# Patient Record
Sex: MALE | Race: WHITE | Employment: OTHER | URBAN - METROPOLITAN AREA
[De-identification: names, ages, dates, MRNs, and addresses within clinical notes are randomized per-mention and may not be internally consistent; named-entity substitution may affect disease eponyms.]

---

## 2017-01-05 RX ORDER — TOPIRAMATE 25 MG/1
75 TABLET ORAL
Qty: 270 TAB | Refills: 0 | Status: SHIPPED | OUTPATIENT
Start: 2017-01-05 | End: 2017-08-10 | Stop reason: SDUPTHER

## 2017-01-05 NOTE — TELEPHONE ENCOUNTER
Last Visit: 08/22/2016 with MD Abdirashid Jiménez    Next Appointment: 02/16/2017 with MD Abdirashid Jiménez   Previous Refill Encounters: 08/04/2016 per NP Harry Hammond #[id number]     Requested Prescriptions     Pending Prescriptions Disp Refills    topiramate (TOPAMAX) 25 mg tablet 270 Tab 0     Sig: Take 3 Tabs by mouth nightly.

## 2017-02-16 ENCOUNTER — OFFICE VISIT (OUTPATIENT)
Dept: ORTHOPEDIC SURGERY | Age: 72
End: 2017-02-16

## 2017-02-16 VITALS
DIASTOLIC BLOOD PRESSURE: 70 MMHG | HEART RATE: 58 BPM | HEIGHT: 72 IN | BODY MASS INDEX: 37.93 KG/M2 | WEIGHT: 280 LBS | SYSTOLIC BLOOD PRESSURE: 131 MMHG

## 2017-02-16 DIAGNOSIS — M48.061 SPINAL STENOSIS, LUMBAR: Primary | ICD-10-CM

## 2017-02-16 RX ORDER — TOPIRAMATE 50 MG/1
TABLET, FILM COATED ORAL
Qty: 90 TAB | Refills: 1 | Status: SHIPPED | OUTPATIENT
Start: 2017-02-16 | End: 2017-07-28 | Stop reason: SDUPTHER

## 2017-02-16 NOTE — PROGRESS NOTES
[facility]  [address]  [address], [address] [place]   Phone: [phone]  Fax: [phone]        PROGRESS NOTE      HISTORY OF PRESENT ILLNESS:  The patient is a 73 y.o. male and was seen today for follow up of complaints of low back pain. He denies radicular symptoms at this time. Previously, he had c/o low back pain into the bilateral lower extremities extending circumferentially to the calves with symptoms consistent for stenosis. He reported an increase in pain at night. He reports lumbar epidural to the L3-L4 interlaminar space on 8/25/15 provided significant relief. He states he has not had any flare ups of severe pain. Patient did attempt to wean off to Topamax 50 qhs with increased pain. Subsequently, he did not discontinue medication and continues with Topamax 75 mg qhs with relief. Lumbar spine MRI dated 2/14/13 per report revealed advanced degenerative disc disease with degenerative joint disease and prominent posterior epidural fat, compromising the spinal canal. There was spinal canal stenosis, most severe at L4-L5. There was mild degenerative foraminal compromise. At his last clinic appointment, patient wished to continue his current treatment. We attempted again to wean off Topamax 75 mg qhs as tolerated. He did not refills of Topamax at that time. The patient returns today with pain location and distribution remain unchanged. He continues to rate pain 0/10. Patient reports he was able to reduce his Topamax dose to 50 mg qhs since 01/2017 without an increase in pain. He states low back and BLE pain has essentially resolved with rare increase in pain with activity.  reviewed.        Past Medical History   Diagnosis Date    Arrhythmia      AFIB    Hypertension     Ill-defined condition      Graves Disease        Social History     Social History    Marital status:      Spouse name: N/A    Number of children: N/A    Years of education: N/A Occupational History    Not on file. Social History Main Topics    Smoking status: Former Smoker    Smokeless tobacco: Never Used    Alcohol use Yes    Drug use: No    Sexual activity: Not on file     Other Topics Concern    Not on file     Social History Narrative       Current Outpatient Prescriptions   Medication Sig Dispense Refill    topiramate (TOPAMAX) 50 mg tablet 1 tab PO QHS 90 Tab 1    topiramate (TOPAMAX) 25 mg tablet Take 3 Tabs by mouth nightly. 270 Tab 0    metoprolol succinate (TOPROL-XL) 100 mg tablet Take 100 mg by mouth daily.  levothyroxine (SYNTHROID) 150 mcg tablet Take 150 mcg by mouth Daily (before breakfast).  topiramate (TOPAMAX) 25 mg tablet Take  by mouth two (2) times a day.  ROSUVASTATIN CALCIUM (CRESTOR PO) Take 10 mg by mouth daily.  LEVOTHYROXINE SODIUM (SYNTHROID PO) Take  by mouth.  METOPROLOL SUCCINATE (TOPROL XL PO) Take  by mouth. Allergies   Allergen Reactions    Statins-Hmg-Coa Reductase Inhibitors Myalgia          PHYSICAL EXAMINATION    Visit Vitals    /70    Pulse (!) 58    Ht 6' (1.829 m)    Wt 280 lb (127 kg)    BMI 37.97 kg/m2       CONSTITUTIONAL: NAD, A&O x 3  SENSATION: Intact to light touch throughout  RANGE OF MOTION: The patient has full passive range of motion in all four extremities. MOTOR:  Straight Leg Raise: Negative, bilateral               Hip Flex Knee Ext Knee Flex Ankle DF GTE Ankle PF Tone   Right +4/5 +4/5 +4/5 +4/5 +4/5 +4/5 +4/5   Left +4/5 +4/5 +4/5 +4/5 +4/5 +4/5 +4/5       ASSESSMENT   Mackenzie Christiansen was seen today for follow-up. Diagnoses and all orders for this visit:    Spinal stenosis, lumbar    Other orders  -     topiramate (TOPAMAX) 50 mg tablet; 1 tab PO QHS          IMPRESSION AND PLAN:  The patient continues to do well even with the reduction in dose of his Topamax. He would like to continue his current regimen. I will refill his Topamax 50 mg qhs.  I will see the patient back in 6 month's time or earlier if needed. Written by Chikis Espinoza, as dictated by Adali Ponce MD  I examined the patient, reviewed and agree with the note.

## 2017-07-28 RX ORDER — TOPIRAMATE 50 MG/1
TABLET, FILM COATED ORAL
Qty: 90 TAB | Refills: 0 | Status: SHIPPED | OUTPATIENT
Start: 2017-07-28 | End: 2021-08-12

## 2017-08-10 ENCOUNTER — OFFICE VISIT (OUTPATIENT)
Dept: ORTHOPEDIC SURGERY | Age: 72
End: 2017-08-10

## 2017-08-10 VITALS
WEIGHT: 280 LBS | SYSTOLIC BLOOD PRESSURE: 159 MMHG | DIASTOLIC BLOOD PRESSURE: 88 MMHG | OXYGEN SATURATION: 100 % | BODY MASS INDEX: 37.93 KG/M2 | TEMPERATURE: 97.6 F | HEIGHT: 72 IN | RESPIRATION RATE: 16 BRPM | HEART RATE: 66 BPM

## 2017-08-10 DIAGNOSIS — M48.061 SPINAL STENOSIS, LUMBAR: Primary | ICD-10-CM

## 2017-08-10 DIAGNOSIS — M51.36 OTHER INTERVERTEBRAL DISC DEGENERATION, LUMBAR REGION: ICD-10-CM

## 2017-08-10 RX ORDER — FLUOCINONIDE 0.5 MG/G
CREAM TOPICAL
COMMUNITY
Start: 2017-08-07 | End: 2021-08-12

## 2017-08-10 RX ORDER — METHYLPREDNISOLONE 4 MG/1
TABLET ORAL
COMMUNITY
Start: 2017-08-07 | End: 2018-02-15 | Stop reason: ALTCHOICE

## 2017-08-10 RX ORDER — HYDROXYZINE 25 MG/1
TABLET, FILM COATED ORAL
COMMUNITY
Start: 2017-08-07 | End: 2021-08-12

## 2017-08-10 RX ORDER — TOPIRAMATE 25 MG/1
TABLET ORAL
Qty: 90 TAB | Refills: 1 | Status: SHIPPED | OUTPATIENT
Start: 2017-08-10 | End: 2021-08-12

## 2017-08-10 NOTE — PROGRESS NOTES
[facility]  [address], [address]  Phone: [phone]  Fax: [phone]        PROGRESS NOTE      HISTORY OF PRESENT ILLNESS:  The patient is a 73 y.o. male and was seen today for follow up of complaints of low back pain. He denies radicular symptoms at this time. Previously, he had c/o low back pain into the BLE extending circumferentially to the calves with symptoms consistent for stenosis. He reported an increase in pain at night. He reports lumbar epidural to the L3-L4 interlaminar space on 8/25/15 provided significant relief. He states he has not had any flare ups of severe pain. Pt reports he was able to reduce his Topamax dose to 50 mg qhs since 1/2017 without an increase in pain. He states low back and BLE pain has essentially resolved with rare increase in pain with activity. Lumbar spine MRI dated 2/14/13 per report revealed advanced degenerative disc disease with degenerative joint disease and prominent posterior epidural fat, compromising the spinal canal. There was spinal canal stenosis, most severe at L4-L5. There was mild degenerative foraminal compromise. At his last clinic appointment, the patient continued to do well even with the reduction in dose of his Topamax. He wished to continue his current regimen. I refilled his Topamax 50 mg qhs. The patient returns today with pain location and distribution remain unchanged. He rates pain 0-1/10, a slight increase since his last visit (0/10). Pt is compliant with Topamax 50 mg qhs.  reviewed. Past Medical History:   Diagnosis Date    Arrhythmia     AFIB    Hypertension     Ill-defined condition     Graves Disease        Social History     Social History    Marital status:      Spouse name: N/A    Number of children: N/A    Years of education: N/A     Occupational History    Not on file.      Social History Main Topics    Smoking status: Former Smoker    Smokeless tobacco: Never Used    Alcohol use Yes    Drug use: No    Sexual activity: Not on file     Other Topics Concern    Not on file     Social History Narrative       Current Outpatient Prescriptions   Medication Sig Dispense Refill    fluocinoNIDE (LIDEX) 0.05 % topical cream       hydrOXYzine HCl (ATARAX) 25 mg tablet       methylPREDNISolone (MEDROL DOSEPACK) 4 mg tablet       topiramate (TOPAMAX) 25 mg tablet 1 tab PO QHS 90 Tab 1    topiramate (TOPAMAX) 50 mg tablet TAKE 1 TABLET AT BEDTIME 90 Tab 0    metoprolol succinate (TOPROL-XL) 100 mg tablet Take 100 mg by mouth daily.  levothyroxine (SYNTHROID) 150 mcg tablet Take 150 mcg by mouth Daily (before breakfast).  ROSUVASTATIN CALCIUM (CRESTOR PO) Take 10 mg by mouth daily.  METOPROLOL SUCCINATE (TOPROL XL PO) Take  by mouth. Allergies   Allergen Reactions    Statins-Hmg-Coa Reductase Inhibitors Myalgia          PHYSICAL EXAMINATION    Visit Vitals    /88 (BP 1 Location: Left arm, BP Patient Position: Sitting)    Pulse 66    Temp 97.6 °F (36.4 °C) (Oral)    Resp 16    Ht 6' (1.829 m)    Wt 280 lb (127 kg)    SpO2 100%    BMI 37.97 kg/m2       CONSTITUTIONAL: NAD, A&O x 3  SENSATION: Intact to light touch throughout  RANGE OF MOTION: The patient has full passive range of motion in all four extremities. MOTOR:  Straight Leg Raise: Negative, bilateral               Hip Flex Knee Ext Knee Flex Ankle DF GTE Ankle PF Tone   Right +4/5 +4/5 +4/5 +4/5 +4/5 +4/5 +4/5   Left +4/5 +4/5 +4/5 +4/5 +4/5 +4/5 +4/5       ASSESSMENT   Diagnoses and all orders for this visit:    1. Spinal stenosis, lumbar    2. Other intervertebral disc degeneration, lumbar region    Other orders  -     topiramate (TOPAMAX) 25 mg tablet; 1 tab PO QHS          IMPRESSION AND PLAN:  The patient continues to do well. He will attempt to decrease his Topamax to 25 mg qhs to see if this dose is able to maintain the same benefits as 50 mg qhs.  If his pain increases with the reduction in his Topamax dose, he is to increase back up to 50 mg qhs. I will see the patient back in 6 month's time or earlier if needed. Written by James Shaw, as dictated by Yanni Renteria MD  I examined the patient, reviewed and agree with the note.

## 2018-02-15 ENCOUNTER — OFFICE VISIT (OUTPATIENT)
Dept: ORTHOPEDIC SURGERY | Age: 73
End: 2018-02-15

## 2018-02-15 VITALS
RESPIRATION RATE: 19 BRPM | OXYGEN SATURATION: 100 % | SYSTOLIC BLOOD PRESSURE: 141 MMHG | HEIGHT: 72 IN | HEART RATE: 61 BPM | WEIGHT: 294 LBS | DIASTOLIC BLOOD PRESSURE: 72 MMHG | TEMPERATURE: 98.2 F | BODY MASS INDEX: 39.82 KG/M2

## 2018-02-15 DIAGNOSIS — M51.36 OTHER INTERVERTEBRAL DISC DEGENERATION, LUMBAR REGION: ICD-10-CM

## 2018-02-15 DIAGNOSIS — M48.062 SPINAL STENOSIS OF LUMBAR REGION WITH NEUROGENIC CLAUDICATION: Primary | ICD-10-CM

## 2018-02-15 NOTE — PROGRESS NOTES
[facility]  [address], [address]  Phone: [phone]  Fax: [phone]        PROGRESS NOTE      HISTORY OF PRESENT ILLNESS:  The patient is a 81 y.o. male and was seen today for follow up of complaints of low back pain. He denies radicular symptoms at this time. Previously, he had c/o low back pain into the BLE extending circumferentially to the calves with symptoms consistent for stenosis. He reported an increase in pain at night. He reports lumbar epidural to the L3-L4 interlaminar space on 8/25/15 provided significant relief. He states he has not had any flare ups of severe pain. Pt reports he was able to reduce his Topamax dose to 50 mg qhs since 1/2017 without an increase in pain. He states low back and BLE pain has essentially resolved with rare increase in pain with activity. Lumbar spine MRI dated 2/14/13 per report revealed advanced degenerative disc disease with degenerative joint disease and prominent posterior epidural fat, compromising the spinal canal. There was spinal canal stenosis, most severe at L4-L5. There was mild degenerative foraminal compromise. At his last clinic appointment, the patient continued to do well. He attempted to decrease his Topamax to 25 mg qhs to see if this dose is able to maintain the same benefits as 50 mg qhs. If his pain increases with the reduction in his Topamax dose, he is to increased back up to 50 mg qhs. The patient returns today with pain location and distribution remain unchanged. He rates pain 0/10, a slight decrease since his last visit (0-1/10). Patient reports stiffness, mostly in the morning. Patient successfully d/c his Topamax  since December 2017 without an additional increase in pain.  reviewed. Body mass index is 39.87 kg/(m^2).         Past Medical History:   Diagnosis Date    Arrhythmia     AFIB    Hypertension     Ill-defined condition     Graves Disease        Social History Social History    Marital status:      Spouse name: N/A    Number of children: N/A    Years of education: N/A     Occupational History    Not on file. Social History Main Topics    Smoking status: Former Smoker    Smokeless tobacco: Never Used    Alcohol use Yes    Drug use: No    Sexual activity: Not on file     Other Topics Concern    Not on file     Social History Narrative       Current Outpatient Prescriptions   Medication Sig Dispense Refill    metoprolol succinate (TOPROL-XL) 100 mg tablet Take 100 mg by mouth daily.  levothyroxine (SYNTHROID) 150 mcg tablet Take 150 mcg by mouth Daily (before breakfast).  ROSUVASTATIN CALCIUM (CRESTOR PO) Take 10 mg by mouth daily.  METOPROLOL SUCCINATE (TOPROL XL PO) Take  by mouth.  fluocinoNIDE (LIDEX) 0.05 % topical cream       hydrOXYzine HCl (ATARAX) 25 mg tablet       topiramate (TOPAMAX) 25 mg tablet 1 tab PO QHS (Patient not taking: Reported on 2/15/2018) 90 Tab 1    topiramate (TOPAMAX) 50 mg tablet TAKE 1 TABLET AT BEDTIME (Patient not taking: Reported on 2/15/2018) 90 Tab 0       Allergies   Allergen Reactions    Statins-Hmg-Coa Reductase Inhibitors Myalgia          PHYSICAL EXAMINATION    Visit Vitals    /72    Pulse 61    Temp 98.2 °F (36.8 °C) (Oral)    Resp 19    Ht 6' (1.829 m)    Wt 294 lb (133.4 kg)    SpO2 100%    BMI 39.87 kg/m2       CONSTITUTIONAL: NAD, A&O x 3  SENSATION: Intact to light touch throughout  RANGE OF MOTION: The patient has full passive range of motion in all four extremities. MOTOR:  Straight Leg Raise: Negative, bilateral               Hip Flex Knee Ext Knee Flex Ankle DF GTE Ankle PF Tone   Right +4/5 +4/5 +4/5 +4/5 +4/5 +4/5 +4/5   Left +4/5 +4/5 +4/5 +4/5 +4/5 +4/5 +4/5       ASSESSMENT   Diagnoses and all orders for this visit:    1. Spinal stenosis of lumbar region with neurogenic claudication    2.  Other intervertebral disc degeneration, lumbar region          IMPRESSION AND PLAN:  Patient was able to discontinued his Topamax without additional increased in pain. I will see the patient back prn. Written by Vinayak Luque, as dictated by Ronna López MD  I examined the patient, reviewed and agree with the note.

## 2018-02-15 NOTE — MR AVS SNAPSHOT
[address] [address] [address] Merged with [facility] [address] 
[phone] Patient: Henry Varma MRN: [id number] OAZ:[id number] Visit Information Date & Time Provider Department Dept. Phone Encounter #  
 2/15/2018  8:10 AM Karla Cooley MD [facility] [phone] [id number] Follow-up Instructions Return if symptoms worsen or fail to improve. Upcoming Health Maintenance Date Due Hepatitis C Screening 1945 DTaP/Tdap/Td series (1 - Tdap) 9/7/1968 FOBT Q 1 YEAR AGE 50-75 9/7/1997 ZOSTER VACCINE AGE 60> 7/7/2007 GLAUCOMA SCREENING Q2Y 9/7/2012 Pneumococcal 65+ Low/Medium Risk (1 of 2 - PCV13) 9/7/2012 MEDICARE YEARLY EXAM 9/7/2012 Influenza Age 5 to Adult 8/1/2017 Allergies as of 2/15/2018  Review Complete On: 2/15/2018 By: Karla Cooley MD  
  
 Severity Noted Reaction Type Reactions Statins-hmg-coa Reductase Inhibitors  08/22/2016    Myalgia Current Immunizations  Never Reviewed No immunizations on file. Not reviewed this visit You Were Diagnosed With   
  
 Codes Comments Spinal stenosis of lumbar region with neurogenic claudication    -  Primary ICD-10-CM: F41.998 
ICD-9-CM: 724.03 Other intervertebral disc degeneration, lumbar region     ICD-10-CM: M51.36 
ICD-9-CM: 722.52 Vitals BP Pulse Temp Resp Height(growth percentile) Weight(growth percentile) 141/72 61 98.2 °F (36.8 °C) (Oral) 19 6' (1.829 m) 294 lb (133.4 kg) SpO2 BMI Smoking Status 100% 39.87 kg/m2 Former Smoker BMI and BSA Data Body Mass Index Body Surface Area  
 39.87 kg/m 2 2.6 m 2 Preferred Pharmacy Pharmacy Name [address] [phone] Your Updated Medication List  
  
   
This list is accurate as of: 2/15/18  8:28 AM.  Always use your most recent med list.  
  
  
  
  
 Maggie Sanchez Take 10 mg by mouth daily. fluocinoNIDE 0.05 % topical cream  
Commonly known as:  LIDEX  
  
 hydrOXYzine HCl 25 mg tablet Commonly known as:  ATARAX  
  
 levothyroxine 150 mcg tablet Commonly known as:  SYNTHROID Take 150 mcg by mouth Daily (before breakfast). * topiramate 50 mg tablet Commonly known as:  TOPAMAX TAKE 1 TABLET AT BEDTIME  
  
 * topiramate 25 mg tablet Commonly known as:  TOPAMAX  
1 tab PO QHS * TOPROL XL PO Take  by mouth. * metoprolol succinate 100 mg tablet Commonly known as:  TOPROL-XL Take 100 mg by mouth daily. * Notice: This list has 4 medication(s) that are the same as other medications prescribed for you. Read the directions carefully, and ask your doctor or other care provider to review them with you. Follow-up Instructions Return if symptoms worsen or fail to improve. Introducing [facility] SERVICES! [facility] introduces [employer] patient portal. Now you can access parts of your medical record, email your doctor's office, and request medication refills online. 1. In your internet browser, go to [email or web address]. [email or web address] 2. Click on the First Time User? Click Here link in the Sign In box. You will see the New Member Sign Up page. 3. Enter your [employer] Access Code exactly as it appears below. You will not need to use this code after youve completed the sign-up process. If you do not sign up before the expiration date, you must request a new code. · [employer] Access Code: [email or web address] Expires: 5/16/2018  8:28 AM 
 
4. Enter the last four digits of your Social Security Number (xxxx) and Date of Birth (mm/dd/yyyy) as indicated and click Submit. You will be taken to the next sign-up page. 5. Create a [employer]ision ID. This will be your [employer] login ID and cannot be changed, so think of one that is secure and easy to remember. 6. Create a [employer] password. You can change your password at any time. 7. Enter your Password Reset Question and Answer. This can be used at a later time if you forget your password. 8. Enter your e-mail address. You will receive e-mail notification when new information is available in [address] 9. Click Sign Up. You can now view and download portions of your medical record. 10. Click the Download Summary menu link to download a portable copy of your medical information. If you have questions, please visit the Frequently Asked Questions section of the [employer] website. Remember, [employer] is NOT to be used for urgent needs. For medical emergencies, dial 911. Now available from your iPhone and Android! Please provide this summary of care documentation to your next provider. Your primary care clinician is listed as Олег Ghosh. If you have any questions after today's visit, please call [phone].

## 2019-11-18 ENCOUNTER — OFFICE VISIT (OUTPATIENT)
Dept: ORTHOPEDIC SURGERY | Age: 74
End: 2019-11-18

## 2019-11-18 VITALS
DIASTOLIC BLOOD PRESSURE: 80 MMHG | BODY MASS INDEX: 41.58 KG/M2 | HEIGHT: 72 IN | RESPIRATION RATE: 16 BRPM | OXYGEN SATURATION: 95 % | SYSTOLIC BLOOD PRESSURE: 124 MMHG | TEMPERATURE: 97.6 F | HEART RATE: 59 BPM | WEIGHT: 307 LBS

## 2019-11-18 DIAGNOSIS — M51.36 OTHER INTERVERTEBRAL DISC DEGENERATION, LUMBAR REGION: ICD-10-CM

## 2019-11-18 DIAGNOSIS — M48.062 SPINAL STENOSIS OF LUMBAR REGION WITH NEUROGENIC CLAUDICATION: ICD-10-CM

## 2019-11-18 DIAGNOSIS — M54.14 THORACIC NEURITIS: ICD-10-CM

## 2019-11-18 DIAGNOSIS — M54.6 THORACIC SPINE PAIN: Primary | ICD-10-CM

## 2019-11-18 DIAGNOSIS — M47.814 THORACIC SPONDYLOSIS WITHOUT MYELOPATHY: ICD-10-CM

## 2019-11-18 DIAGNOSIS — M51.34 DDD (DEGENERATIVE DISC DISEASE), THORACIC: ICD-10-CM

## 2019-11-18 DIAGNOSIS — E66.01 OBESITY, MORBID (HCC): ICD-10-CM

## 2019-11-18 NOTE — LETTER
11/18/19 Patient: Princess Rojas YOB: 1945 Date of Visit: 11/18/2019 Letty Hendrickson MD 
[address] [place] [address] VIA Facsimile: [phone] Dear Letty Hendrickson MD, Thank you for referring Mr. Deborah Haynes to [address] for evaluation. My notes for this consultation are attached. If you have questions, please do not hesitate to call me. I look forward to following your patient along with you. Sincerely, Ruslan Card MD

## 2019-11-18 NOTE — PROGRESS NOTES
[facility]  [address], [place]   Phone: [phone]  Fax: [phone]        PROGRESS NOTE      HISTORY OF PRESENT ILLNESS:  The patient is a 69 y.o. male and was seen today for follow up of complaints of low back pain. He denies radicular symptoms at this time. Previously, he had c/o low back pain into the BLE extending circumferentially to the calves with symptoms consistent for stenosis. He reported an increase in pain at night. He reports lumbar epidural to the L3-L4 interlaminar space on 8/25/15 provided significant relief. He states he has not had any flare ups of severe pain. Pt reports he was able to reduce his Topamax dose to 50 mg qhs since 1/2017 without an increase in pain. He states low back and BLE pain has essentially resolved with rare increase in pain with activity. Lumbar spine MRI dated 2/14/13 per report revealed advanced degenerative disc disease with degenerative joint disease and prominent posterior epidural fat, compromising the spinal canal. There was spinal canal stenosis, most severe at L4-L5. There was mild degenerative foraminal compromise. At his last clinic appointment, patient was able to discontinue his Topamax without additional increased in pain. Scheduled to f/u prn. The patient returns today with paraesthesias in between the shoulder blades extending around into the chest. He rates his pain 0-9/10. He reports he has not had pain in the last 3 days. Denies neck pain. Last seen by me 2/15/18, pain at that time was 0/10. Pt reports he stopped exercising due to rotator cuff. Did not have PT for his lower back. Pt denies dropping things or loss of balance. Pt denies change in bowel or bladder habits.  reviewed. Body mass index is 41.64 kg/m².       PCP: Johnson Wynne MD      Past Medical History:   Diagnosis Date    Arrhythmia     AFIB    Hypertension     Ill-defined condition     Graves Disease        Social History     Socioeconomic History    Marital status:      Spouse name: Not on file    Number of children: Not on file    Years of education: Not on file    Highest education level: Not on file   Occupational History    Not on file   Social Needs    Financial resource strain: Not on file    Food insecurity:     Worry: Not on file     Inability: Not on file    Transportation needs:     Medical: Not on file     Non-medical: Not on file   Tobacco Use    Smoking status: Former Smoker    Smokeless tobacco: Never Used   Substance and Sexual Activity    Alcohol use: Yes    Drug use: No    Sexual activity: Not on file   Lifestyle    Physical activity:     Days per week: Not on file     Minutes per session: Not on file    Stress: Not on file   Relationships    Social connections:     Talks on phone: Not on file     Gets together: Not on file     Attends Confucianism service: Not on file     Active member of club or organization: Not on file     Attends meetings of clubs or organizations: Not on file     Relationship status: Not on file    Intimate partner violence:     Fear of current or ex partner: Not on file     Emotionally abused: Not on file     Physically abused: Not on file     Forced sexual activity: Not on file   Other Topics Concern    Not on file   Social History Narrative    Not on file       Current Outpatient Medications   Medication Sig Dispense Refill    fluocinoNIDE (LIDEX) 0.05 % topical cream       hydrOXYzine HCl (ATARAX) 25 mg tablet       metoprolol succinate (TOPROL-XL) 100 mg tablet Take 100 mg by mouth daily.  levothyroxine (SYNTHROID) 150 mcg tablet Take 150 mcg by mouth Daily (before breakfast).  ROSUVASTATIN CALCIUM (CRESTOR PO) Take 10 mg by mouth daily.       topiramate (TOPAMAX) 25 mg tablet 1 tab PO QHS (Patient not taking: Reported on 2/15/2018) 90 Tab 1    topiramate (TOPAMAX) 50 mg tablet TAKE 1 TABLET AT BEDTIME (Patient not taking: Reported on 2/15/2018) 90 Tab 0    METOPROLOL SUCCINATE (TOPROL XL PO) Take  by mouth. Allergies   Allergen Reactions    Statins-Hmg-Coa Reductase Inhibitors Myalgia          PHYSICAL EXAMINATION    Visit Vitals  /80 (BP 1 Location: Left arm, BP Patient Position: Sitting)   Pulse (!) 59   Temp 97.6 °F (36.4 °C) (Oral)   Resp 16   Ht 6' (1.829 m)   Wt 307 lb (139.3 kg)   SpO2 95%   BMI 41.64 kg/m²       CONSTITUTIONAL: NAD, A&O x 3  SENSATION: Decreased sensation to light touch right lateral thigh. Sensation to light touch otherwise intact. NEURO: Barbara's is negative bilaterally. RANGE OF MOTION: The patient has full passive range of motion in all four extremities. MOTOR:  Straight Leg Raise: Negative, bilateral     Shoulder AB/Flex Elbow Flex Wrist Ext Elbow Ext Wrist Flex Hand Intrin Tone   Right +4/5 +4/5 +4/5 +4/5 +4/5 +4/5 +4/5   Left +4/5 +4/5 +4/5 +4/5 +4/5 +4/5 +4/5              Hip Flex Knee Ext Knee Flex Ankle DF GTE Ankle PF Tone   Right +4/5 +4/5 +4/5 +4/5 +4/5 +4/5 +4/5   Left +4/5 +4/5 +4/5 +4/5 +4/5 +4/5 +4/5     RADIOGRAPHS  Preliminary reading of thoracic plain films:  Limited study due to body habitus. Degenerative changes noted throughout the thoracic spine. No acute pathology identified. These are being sent out for official reading by Dr. Jose Luis Rodriguez. ASSESSMENT   Diagnoses and all orders for this visit:    1. Thoracic spine pain  -     AMB POC XRAY, SPINE; THORACIC, 2 VIEW    2. Obesity, morbid (HCC)  -     AMB POC XRAY, SPINE; THORACIC, 2 VIEW    3. Spinal stenosis of lumbar region with neurogenic claudication    4. Other intervertebral disc degeneration, lumbar region    5. Thoracic spondylosis without myelopathy    6. Thoracic neuritis    7.  DDD (degenerative disc disease), thoracic          IMPRESSION AND PLAN:  The patient returns today with paraesthesias in between the shoulder blades extending around into the chest. Patient describes sxs consistent with thoracic radiculopathy. He reports he has been pain free x 1 weeks. He reports this is not a chronic problem. He reports his lower back continue to do well. The patient does not think his remaining pain complaints are severe enough to warrant additional workup/treatment at this time. Pt has been notified to call our office if his pain returns. Patient is neurologically intact. I will see the patient back prn. Written by Tennille Oropeza, as dictated by Isis Sweeney MD  I examined the patient, reviewed and agree with the note.

## 2020-06-29 NOTE — PROGRESS NOTES
[facility]  [address], [place] [address]  Phone: [phone]  Fax: [phone]        PROGRESS NOTE      HISTORY OF PRESENT ILLNESS:  The patient is a 69 y.o. male and was seen today for follow up of paraesthesias in between the shoulder blades radiating around into the chest. Previously, he was seen for complaints of low back pain. He denies radicular symptoms at this time. Previously, he had c/o low back pain into the BLE radiating circumferentially to the calves with symptoms consistent for stenosis. He reported an increase in pain at night. He reports lumbar epidural to the L3-L4 interlaminar space on 8/25/15 provided significant relief. He states he has not had any flare ups of severe pain. Pt reports he was able to reduce his Topamax dose to 50 mg qhs since 1/2017 without an increase in pain. He states low back and BLE pain has essentially resolved with rare increase in pain with activity. Lumbar spine MRI dated 2/14/13 per report revealed advanced degenerative disc disease with degenerative joint disease and prominent posterior epidural fat, compromising the spinal canal. There was spinal canal stenosis, most severe at L4-L5. There was mild degenerative foraminal compromise. Preliminary reading of thoracic plain films: Limited study due to body habitus. Degenerative changes noted throughout the thoracic spine. No acute pathology identified. At his last clinic appointment,  patient described sxs consistent with thoracic radiculopathy. He reported he had been pain free x 1 week. He reported this was not a chronic problem. He reported his lower back continue to do well. The patient did not think his remaining pain complaints were severe enough to warrant additional workup/treatment at the time.     The patient returns today with low back pain radiating into the right groin and RLE to the knee. He rates his pain 0-10/10, previously 0-9/10.  His groin pain is exacerbated by hip flexion and extention and with activities such as putting on shoes and socks. He reports a loss in ROM of his right hip. Pt previously underwent a left total hip replacement by Dr. Maisha Jaquez.  reviewed. Body mass index is 43.43 kg/m². PCP: Breann Vieyra MD      Past Medical History:   Diagnosis Date    Arrhythmia     AFIB    Hypertension     Ill-defined condition     Graves Disease        Social History     Socioeconomic History    Marital status:      Spouse name: Not on file    Number of children: Not on file    Years of education: Not on file    Highest education level: Not on file   Occupational History    Not on file   Social Needs    Financial resource strain: Not on file    Food insecurity     Worry: Not on file     Inability: Not on file    Transportation needs     Medical: Not on file     Non-medical: Not on file   Tobacco Use    Smoking status: Former Smoker    Smokeless tobacco: Never Used   Substance and Sexual Activity    Alcohol use: Yes    Drug use: No    Sexual activity: Not on file   Lifestyle    Physical activity     Days per week: Not on file     Minutes per session: Not on file    Stress: Not on file   Relationships    Social connections     Talks on phone: Not on file     Gets together: Not on file     Attends [employer] service: Not on file     Active member of club or organization: Not on file     Attends meetings of clubs or organizations: Not on file     Relationship status: Not on file    Intimate partner violence     Fear of current or ex partner: Not on file     Emotionally abused: Not on file     Physically abused: Not on file     Forced sexual activity: Not on file   Other Topics Concern    Not on file   Social History Narrative    Not on file       Current Outpatient Medications   Medication Sig Dispense Refill    metoprolol succinate (TOPROL-XL) 100 mg tablet Take 100 mg by mouth daily.       levothyroxine (SYNTHROID) 150 mcg tablet Take 150 mcg by mouth Daily (before breakfast).  ROSUVASTATIN CALCIUM (CRESTOR PO) Take 10 mg by mouth daily.  METOPROLOL SUCCINATE (TOPROL XL PO) Take  by mouth.  fluocinoNIDE (LIDEX) 0.05 % topical cream       hydrOXYzine HCl (ATARAX) 25 mg tablet       topiramate (TOPAMAX) 25 mg tablet 1 tab PO QHS (Patient not taking: Reported on 2/15/2018) 90 Tab 1    topiramate (TOPAMAX) 50 mg tablet TAKE 1 TABLET AT BEDTIME (Patient not taking: Reported on 2/15/2018) 90 Tab 0       Allergies   Allergen Reactions    Statins-Hmg-Coa Reductase Inhibitors Myalgia          PHYSICAL EXAMINATION    Visit Vitals  /87 (BP 1 Location: Right arm, BP Patient Position: Sitting)   Pulse 62   Temp 97.5 °F (36.4 °C) (Oral)   Resp 18   Ht 6' (1.829 m)   Wt 320 lb 3.2 oz (145.2 kg)   SpO2 96%   BMI 43.43 kg/m²       CONSTITUTIONAL: NAD, A&O x 3  SENSATION: Decreased sensation to light touch on upper RLE. Otherwise, intact to light touch throughout  RANGE OF MOTION: The patient has full passive range of motion in all four extremities. MOTOR:  Straight Leg Raise: Negative, bilateral               Hip Flex Knee Ext Knee Flex Ankle DF GTE Ankle PF Tone   Right +4/5 +4/5 +4/5 +4/5 +4/5 +4/5 +4/5   Left +4/5 +4/5 +4/5 +4/5 +4/5 +4/5 +4/5     RADIOGRAPHS  Preliminary reading of Pelvis plain films dated 7/2/2020 revealed: Moderately severe joint space narrowing throughout the right hip. These are being sent out for official reading by Dr. Ankita Choi. ASSESSMENT   Diagnoses and all orders for this visit:    1. Hip pain  -     POC XRAY, PELVIS; 1 OR 2 VIEWS    2. Spinal stenosis of lumbar region, unspecified whether neurogenic claudication present    3. DDD (degenerative disc disease), lumbar    4.  Osteoarthritis of right hip, unspecified osteoarthritis type    Other orders  -     REFERRAL TO ORTHOPEDICS      IMPRESSION AND PLAN:  Patient returns to the office today with c/o low back pain radiating into the right groin and RLE to the knee. His symptoms are more consistent with hip pathology. Moderately severe OA of the right hip noted after review of films. Pt previously underwent left total hip replacement with Dr. Tata Alvarez. Patient is neurologically intact. I will refer him back to Dr. Tata Alvarez for evaluation of his right hip. I will see the pt back prn. Written by Emily Thompson, as dictated by Aaron Villarreal MD  I examined the patient, reviewed and agree with the note.

## 2020-07-02 ENCOUNTER — OFFICE VISIT (OUTPATIENT)
Dept: ORTHOPEDIC SURGERY | Age: 75
End: 2020-07-02

## 2020-07-02 VITALS
OXYGEN SATURATION: 96 % | DIASTOLIC BLOOD PRESSURE: 87 MMHG | WEIGHT: 315 LBS | TEMPERATURE: 97.5 F | HEIGHT: 72 IN | BODY MASS INDEX: 42.66 KG/M2 | SYSTOLIC BLOOD PRESSURE: 153 MMHG | RESPIRATION RATE: 18 BRPM | HEART RATE: 62 BPM

## 2020-07-02 DIAGNOSIS — M16.11 OSTEOARTHRITIS OF RIGHT HIP, UNSPECIFIED OSTEOARTHRITIS TYPE: ICD-10-CM

## 2020-07-02 DIAGNOSIS — M25.559 HIP PAIN: Primary | ICD-10-CM

## 2020-07-02 DIAGNOSIS — M51.36 DDD (DEGENERATIVE DISC DISEASE), LUMBAR: ICD-10-CM

## 2020-07-02 DIAGNOSIS — M48.061 SPINAL STENOSIS OF LUMBAR REGION, UNSPECIFIED WHETHER NEUROGENIC CLAUDICATION PRESENT: ICD-10-CM

## 2020-07-02 NOTE — LETTER
7/2/20 Patient: Alek Hendrickson YOB: 1945 Date of Visit: 7/2/2020 Anahi Medina MD 
[address] [place] [id number] VIA Facsimile: [phone] Dear Anahi Medina MD, Thank you for referring Mr. Lorin Soliz to [address] for evaluation. My notes for this consultation are attached. If you have questions, please do not hesitate to call me. I look forward to following your patient along with you. Sincerely, Dinesh Beaver MD

## 2020-08-07 ENCOUNTER — OFFICE VISIT (OUTPATIENT)
Dept: ORTHOPEDIC SURGERY | Age: 75
End: 2020-08-07

## 2020-08-07 VITALS
OXYGEN SATURATION: 96 % | HEART RATE: 62 BPM | WEIGHT: 315 LBS | BODY MASS INDEX: 42.66 KG/M2 | TEMPERATURE: 96.8 F | HEIGHT: 72 IN | SYSTOLIC BLOOD PRESSURE: 148 MMHG | RESPIRATION RATE: 16 BRPM | DIASTOLIC BLOOD PRESSURE: 84 MMHG

## 2020-08-07 DIAGNOSIS — R20.0 RIGHT LEG NUMBNESS: ICD-10-CM

## 2020-08-07 DIAGNOSIS — M54.50 LUMBAR PAIN: ICD-10-CM

## 2020-08-07 DIAGNOSIS — M16.11 PRIMARY OSTEOARTHRITIS OF RIGHT HIP: ICD-10-CM

## 2020-08-07 DIAGNOSIS — M25.551 RIGHT HIP PAIN: Primary | ICD-10-CM

## 2020-08-07 NOTE — PROGRESS NOTES
Patient: Obdulio Boyer                MRN: [id number]       SSN: [id number]  YOB: 1945        AGE: 69 y.o. SEX: male  Body mass index is 43.64 kg/m². PCP: Alan Kline MD  08/07/20    HISTORY:  I had the pleasure of reviewing Mr. Justus Shaffer. I replaced his hip about 10 years ago, and she has done extremely well. He is starting to get symptoms on the right side, groin pain, difficulty putting shoes and socks on, and pain at night. He is a retired  and spent a lot of years walking. His back is bothering him. He has noticed more radicular pain going down the right leg and it is bothering him as well. I suspect he also has a component of meralgia paresthetica proximally. He otherwise has been feeling well. He has put a little bit of weight on. His body mass index is currently at 44. PHYSICAL EXAMINATION:  On examination today, he is a very nice gentleman. He holds his back quite stiffly. He is a little bit kyphotic. It takes him a bit to straighten out. The left hip replacement is looking terrific. The right hip is fairly stiff with about 10ø of internal rotation, which starts to reproduce his groin discomfort. He does have some numbness involving the lateral femoral cutaneous nerve proximally and quite a bit of numbness involving L4-5 although no foot drop. EHL is a 4+ to -5/5, and straight leg raise is just equivocal.     RADIOGRAPHS:  X-rays confirm advancing arthritis of the right hip, especially anteriorly. The left hip replacement is a [employer], which looks wonderful. PLAN:  He has tried antiinflammatories and some home exercises. I think he should have an MRI given this neurologic deficit in the right leg. I think he is heading towards a hip replacement. We are going to try him with an intraarticular injection. His body mass index will have to be under 40 for surgery, which he understands.   It has been an absolute pleasure to share in his care. He is a very nice gentleman, and I look forward to helping him with surgery when the timing is correct. cc: Dulce Meade M.D. REVIEW OF SYSTEMS:      CON: negative  EYE: negative   ENT: negative  RESP: negative  GI:    negative   :  negative  MSK: Positive  A twelve point review of systems was completed, positives noted and all other systems were reviewed and are negative          Past Medical History:   Diagnosis Date    Arrhythmia     AFIB    Hypertension     Ill-defined condition     Graves Disease       Family History   Problem Relation Age of Onset    No Known Problems Mother     Diabetes Father     Diabetes Sister        Current Outpatient Medications   Medication Sig Dispense Refill    metoprolol succinate (TOPROL-XL) 100 mg tablet Take 100 mg by mouth daily.  levothyroxine (SYNTHROID) 150 mcg tablet Take 150 mcg by mouth Daily (before breakfast).  ROSUVASTATIN CALCIUM (CRESTOR PO) Take 10 mg by mouth daily.  METOPROLOL SUCCINATE (TOPROL XL PO) Take  by mouth.       fluocinoNIDE (LIDEX) 0.05 % topical cream       hydrOXYzine HCl (ATARAX) 25 mg tablet       topiramate (TOPAMAX) 25 mg tablet 1 tab PO QHS (Patient not taking: Reported on 2/15/2018) 90 Tab 1    topiramate (TOPAMAX) 50 mg tablet TAKE 1 TABLET AT BEDTIME (Patient not taking: Reported on 2/15/2018) 90 Tab 0       Allergies   Allergen Reactions    Statins-Hmg-Coa Reductase Inhibitors Myalgia       Past Surgical History:   Procedure Laterality Date    HX HERNIA REPAIR  1990    HX THYROIDECTOMY      SINUS SURGERY PROC UNLISTED  2011    TOTAL HIP ARTHROPLASTY Left 2011    by Dr. Juan Sotelo History     Socioeconomic History    Marital status:      Spouse name: Not on file    Number of children: Not on file    Years of education: Not on file    Highest education level: Not on file   Occupational History    Not on file   Social Needs    Financial resource strain: Not on file    Food insecurity     Worry: Not on file     Inability: Not on file    Transportation needs     Medical: Not on file     Non-medical: Not on file   Tobacco Use    Smoking status: Former Smoker    Smokeless tobacco: Never Used   Substance and Sexual Activity    Alcohol use: Yes    Drug use: No    Sexual activity: Not on file   Lifestyle    Physical activity     Days per week: Not on file     Minutes per session: Not on file    Stress: Not on file   Relationships    Social connections     Talks on phone: Not on file     Gets together: Not on file     Attends Sabianism service: Not on file     Active member of club or organization: Not on file     Attends meetings of clubs or organizations: Not on file     Relationship status: Not on file    Intimate partner violence     Fear of current or ex partner: Not on file     Emotionally abused: Not on file     Physically abused: Not on file     Forced sexual activity: Not on file   Other Topics Concern    Not on file   Social History Narrative    Not on file       Visit Vitals  /84 (BP 1 Location: Left arm, BP Patient Position: Sitting)   Pulse 62   Temp 96.8 °F (36 °C) (Oral)   Resp 16   Ht 6' (1.829 m)   Wt 146 kg (321 lb 12.8 oz)   SpO2 96%   BMI 43.64 kg/m²         PHYSICAL EXAMINATION:  GENERAL: Alert and oriented x3, in no acute distress, well-developed, well-nourished, afebrile. HEART: No JVD. EYES: No scleral icterus   NECK: No significant lymphadenopathy   LUNGS: No respiratory compromise or indrawing  ABDOMEN: Soft, non-tender, non-distended. Electronically signed by:  Ralf Rueda MD

## 2020-08-12 ENCOUNTER — TELEPHONE (OUTPATIENT)
Dept: ORTHOPEDIC SURGERY | Age: 75
End: 2020-08-12

## 2020-08-12 NOTE — TELEPHONE ENCOUNTER
Dr Celestino Frederick,                                 This patient was seen by you on 7/2 for back and right hip pain. You had referred him back to DR Joby Chavez for his Right hip who seen the patient on 8/7 and ordered a right intrarticular hip injection at the hospital and a lumbar MRI. The MRI is scheduled for 8/19 and the patient wants you to do the hip injection. Patient also says that he needs another back injection as well. (same area as last time). He has 3 questions: 1) Will you do his Hip and back injections? 2) Do you want him to wait until after the MRI to have the injections so that you can see the results beforehand? 3.) Can they be done on the same day?

## 2020-08-13 NOTE — TELEPHONE ENCOUNTER
Ok, I scheduled the Rt Intraticular hip Injection. I will ask the nurses to put in an order.  He will be following up with you for the MRI L spine and then hopes to be set up for a spinal injection at that time

## 2020-08-14 ENCOUNTER — TELEPHONE (OUTPATIENT)
Dept: ORTHOPEDIC SURGERY | Age: 75
End: 2020-08-14

## 2020-08-14 DIAGNOSIS — M25.551 RIGHT HIP PAIN: Primary | ICD-10-CM

## 2020-08-14 NOTE — TELEPHONE ENCOUNTER
PLEASE PUT IN ORDER FOR RT INTRARTICULAR HIP INJECTION AT   [facility]  LOCAL  NOT DIABETIC  NOT ON BLOOD THINNER   PLS SEE MESSAGE 8/13/2020

## 2020-08-19 ENCOUNTER — HOSPITAL ENCOUNTER (OUTPATIENT)
Age: 75
Discharge: HOME | End: 2020-08-19
Attending: ORTHOPAEDIC SURGERY
Payer: MEDICARE

## 2020-08-19 DIAGNOSIS — R20.0 RIGHT LEG NUMBNESS: ICD-10-CM

## 2020-08-19 DIAGNOSIS — M54.50 LUMBAR PAIN: ICD-10-CM

## 2020-08-19 PROCEDURE — 72148 MRI LUMBAR SPINE W/O DYE: CPT

## 2020-08-20 ENCOUNTER — TELEPHONE (OUTPATIENT)
Dept: ORTHOPEDIC SURGERY | Age: 75
End: 2020-08-20

## 2020-08-25 NOTE — PROGRESS NOTES
[facility]  [address], [place]   Phone: [phone]  Fax: [phone]        PROGRESS NOTE      HISTORY OF PRESENT ILLNESS:  The patient is a 69 y.o. male and was seen today for follow up of low back pain radiating into the right groin and RLE to the knee. Previously, he was seen for paraesthesias in between the shoulder blades radiating around into the chest. Previously, he was seen for complaints of low back pain. He denies radicular symptoms at this time. Previously, he had c/o low back pain into the BLE radiating circumferentially to the calves with symptoms consistent for stenosis. He reported an increase in pain at night. His groin pain is exacerbated by hip flexion and extention and with activities such as putting on shoes and socks. He reports a loss in ROM of his right hip. He reports lumbar epidural to the L3-L4 interlaminar space on 8/25/15 provided significant relief. He states he has not had any flare ups of severe pain. Pt reports he was able to reduce his Topamax dose to 50 mg qhs since 1/2017 without an increase in pain. He states low back and BLE pain has essentially resolved with rare increase in pain with activity. Pt previously underwent a left total hip replacement by Dr. Breanne Mathur. Lumbar spine MRI dated 2/14/13 per report revealed advanced degenerative disc disease with degenerative joint disease and prominent posterior epidural fat, compromising the spinal canal. There was spinal canal stenosis, most severe at L4-L5. There was mild degenerative foraminal compromise. Preliminary reading of thoracic plain films: Limited study due to body habitus. Degenerative changes noted throughout the thoracic spine. No acute pathology identified. Preliminary reading of Pelvis plain films dated 7/2/2020 revealed:  Moderately severe joint space narrowing throughout the right hip. At his last clinic appointment, his symptoms were more consistent with hip pathology. Moderately severe OA of the right hip noted after review of films. Pt previously underwent left total hip replacement with Dr. Vicki Peoples. I referred him back to Dr. Vicki Peoples for evaluation of his right hip.     The patient returns today with low back pain. He rates his pain 0-8/10, previously 0-10/10. He denies radicular symptoms at this time. His pain is exacerbated by laying prone. Pt underwent a right hip intraarticular injection with significant benefit. Pt denies change in bowel or bladder habits. Note from Dr. Vicki Peoples dated 8/7/2020 indicating patient was seen with c/o right sided hip and groin pain. Pt has difficulty putting shoes and socks on, and pain at night. Pt has noticed more radicular pain into his RLE. 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 Indicated he may need a RT hip replacement but thought he should try a hip injection first. Ordered a L spine MRI. L spine MRI dated 8/19/2020 films independently reviewed. Per report, diffuse disc bulge with superimposed left paracentral inferiorly directed extrusion at L2-L3, contributing to moderate spinal canal narrowing at the L2-L3 disc level and just below. Additional underlying degenerative disc disease and facet arthropathy also produce the following: Moderate to severe spinal canal stenosis at L4-L5. Moderate spinal canal stenosis at 3-L4 and L5-S1. 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  reviewed. Body mass index is 43.07 kg/m².     PCP: Crystal Stratton MD      Past Medical History:   Diagnosis Date    Arrhythmia     AFIB    Hypertension     Ill-defined condition     Graves Disease        Social History     Socioeconomic History    Marital status:      Spouse name: Not on file    Number of children: Not on file    Years of education: Not on file    Highest education level: Not on file   Occupational History    Not on file   Social Needs    Financial resource strain: Not on file    Food insecurity     Worry: Not on file     Inability: Not on file    Transportation needs     Medical: Not on file     Non-medical: Not on file   Tobacco Use    Smoking status: Former Smoker    Smokeless tobacco: Never Used   Substance and Sexual Activity    Alcohol use: Yes    Drug use: No    Sexual activity: Not on file   Lifestyle    Physical activity     Days per week: Not on file     Minutes per session: Not on file    Stress: Not on file   Relationships    Social connections     Talks on phone: Not on file     Gets together: Not on file     Attends Synagogue service: Not on file     Active member of club or organization: Not on file     Attends meetings of clubs or organizations: Not on file     Relationship status: Not on file    Intimate partner violence     Fear of current or ex partner: Not on file     Emotionally abused: Not on file     Physically abused: Not on file     Forced sexual activity: Not on file   Other Topics Concern    Not on file   Social History Narrative    Not on file       Current Outpatient Medications   Medication Sig Dispense Refill    metoprolol succinate (TOPROL-XL) 100 mg tablet Take 100 mg by mouth daily.  levothyroxine (SYNTHROID) 150 mcg tablet Take 150 mcg by mouth Daily (before breakfast).  ROSUVASTATIN CALCIUM (CRESTOR PO) Take 10 mg by mouth daily.  fluocinoNIDE (LIDEX) 0.05 % topical cream       hydrOXYzine HCl (ATARAX) 25 mg tablet       topiramate (TOPAMAX) 25 mg tablet 1 tab PO QHS (Patient not taking: Reported on 2/15/2018) 90 Tab 1    topiramate (TOPAMAX) 50 mg tablet TAKE 1 TABLET AT BEDTIME (Patient not taking: Reported on 2/15/2018) 90 Tab 0    METOPROLOL SUCCINATE (TOPROL XL PO) Take  by mouth.          Allergies   Allergen Reactions    Statins-Hmg-Coa Reductase Inhibitors Myalgia          PHYSICAL EXAMINATION    Visit Vitals  /70 (BP 1 Location: Left arm, BP Patient Position: Sitting)   Pulse 60   Temp 97.8 °F (36.6 °C) (Temporal)   Wt 317 lb 9.6 oz (144.1 kg)   SpO2 95%   BMI 43.07 kg/m²       CONSTITUTIONAL: NAD, A&O x 3  SENSATION: Intact to light touch throughout  RANGE OF MOTION: The patient has full passive range of motion in all four extremities. MOTOR:  Straight Leg Raise: Negative, bilateral                 Hip Flex Knee Ext Knee Flex Ankle DF GTE Ankle PF Tone   Right +4/5 +4/5 +4/5 +4/5 +4/5 +4/5 +4/5   Left +4/5 +4/5 +4/5 +4/5 +4/5 +4/5 +4/5       ASSESSMENT   Diagnoses and all orders for this visit:    1. Spinal stenosis of lumbar region, unspecified whether neurogenic claudication present  -     SCHEDULE SURGERY    2. DDD (degenerative disc disease), lumbar  -     SCHEDULE SURGERY    3. Osteoarthritis of right hip, unspecified osteoarthritis type  -     SCHEDULE SURGERY    4. HNP (herniated nucleus pulposus), lumbar  -     SCHEDULE SURGERY      IMPRESSION AND PLAN:  Patient returns to the office today with c/o low back pain. Multiple treatment options were discussed. Pt elected to proceed with blocks. I will order a epidural L3-4. Patient is neurologically intact. I will see the patient back following the block or earlier if needed. Written by Emily Thompson, as dictated by Aaron Villarreal MD  I examined the patient, reviewed and agree with the note.

## 2020-08-26 ENCOUNTER — OFFICE VISIT (OUTPATIENT)
Dept: ORTHOPEDIC SURGERY | Age: 75
End: 2020-08-26

## 2020-08-26 VITALS
BODY MASS INDEX: 43.07 KG/M2 | HEART RATE: 60 BPM | OXYGEN SATURATION: 95 % | WEIGHT: 315 LBS | TEMPERATURE: 97.8 F | DIASTOLIC BLOOD PRESSURE: 70 MMHG | SYSTOLIC BLOOD PRESSURE: 128 MMHG

## 2020-08-26 DIAGNOSIS — M51.26 HNP (HERNIATED NUCLEUS PULPOSUS), LUMBAR: ICD-10-CM

## 2020-08-26 DIAGNOSIS — M51.36 DDD (DEGENERATIVE DISC DISEASE), LUMBAR: ICD-10-CM

## 2020-08-26 DIAGNOSIS — M16.11 OSTEOARTHRITIS OF RIGHT HIP, UNSPECIFIED OSTEOARTHRITIS TYPE: ICD-10-CM

## 2020-08-26 DIAGNOSIS — M48.061 SPINAL STENOSIS OF LUMBAR REGION, UNSPECIFIED WHETHER NEUROGENIC CLAUDICATION PRESENT: Primary | ICD-10-CM

## 2020-08-26 NOTE — LETTER
8/26/20 Patient: Maxime Major YOB: 1945 Date of Visit: 8/26/2020 Adamaris Yu MD 
[address] [place] [address] [facility] [address] VIA Facsimile: [phone] Dear Adamaris Yu MD, Thank you for referring Mr. Paula Chin to [address] for evaluation. My notes for this consultation are attached. If you have questions, please do not hesitate to call me. I look forward to following your patient along with you. Sincerely, Tadeo Arreaga MD

## 2020-09-29 NOTE — PROGRESS NOTES
[facility]  [address], [place]   Phone: [phone]  Fax: [phone]        PROGRESS NOTE      HISTORY OF PRESENT ILLNESS:  The patient is a 70 y.o. male and was seen today for follow up of low back pain, previously radiating into the right groin and RLE to the knee. Previously, he was seen for paraesthesias in between the shoulder blades radiating around into the chest. Previously, he was seen for complaints of low back pain. He denies radicular symptoms at this time. Previously, he had c/o low back pain into the BLE radiating circumferentially to the calves with symptoms consistent for stenosis. He reported an increase in pain at night. His groin pain is exacerbated by hip flexion and extention and with activities such as putting on shoes and socks. He reports a loss in ROM of his right hip. He reports lumbar epidural to the L3-L4 interlaminar space on 8/25/15 provided significant relief. Pt underwent a right hip intraarticular injection with significant benefit. He states he has not had any flare ups of severe pain. Pt reports he was able to reduce his Topamax dose to 50 mg qhs since 1/2017 without an increase in pain. He states low back and BLE pain has essentially resolved with rare increase in pain with activity. Pt previously underwent a left total hip replacement by Dr. Iona Harley. Note from Dr. Iona Harley dated 8/7/2020 indicating patient was seen with c/o right sided hip and groin pain. Pt has difficulty putting shoes and socks on, and pain at night. Pt has noticed more radicular pain into his RLE. 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 Indicated he may need a RT hip replacement but thought he should try a hip injection first. Ordered a L spine MRI. Preliminary reading of thoracic plain films: Limited study due to body habitus.  Degenerative changes noted throughout the thoracic spine. No acute pathology identified. Preliminary reading of Pelvis plain films dated 7/2/2020 revealed: Moderately severe joint space narrowing throughout the right hip. L spine MRI dated 8/19/2020 films independently reviewed. Per report, diffuse disc bulge with superimposed left paracentral inferiorly directed extrusion at L2-L3, contributing to moderate spinal canal narrowing at the L2-L3 disc level and just below. Additional underlying degenerative disc disease and facet arthropathy also produce the following: Moderate to severe spinal canal stenosis at L4-L5. Moderate spinal canal stenosis at 3-L4 and L5-S1. 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 At his last clinic appointment, pt elected to proceed with blocks. I ordered an epidural L3-4.       The patient returns today with low back pain. He rates his pain 0-1/10, previously 0-8/10. He describes his pain more as a soreness and less intense in nature. Pt underwent epidural L3-4 on 9/1/2020 with significant relief. His right hip is stable at this time and he reports an improvement in his ROM. Pt has a f/u with Dr. Anaid Frederick on 10/2/2020. Pt denies change in bowel or bladder habits.  reviewed. Body mass index is 43.32 kg/m².     PCP: Alo Coronado MD      Past Medical History:   Diagnosis Date    Arrhythmia     AFIB    Hypertension     Ill-defined condition     Graves Disease        Social History     Socioeconomic History    Marital status:      Spouse name: Not on file    Number of children: Not on file    Years of education: Not on file    Highest education level: Not on file   Occupational History    Not on file   Social Needs    Financial resource strain: Not on file    Food insecurity     Worry: Not on file     Inability: Not on file    Transportation needs     Medical: Not on file     Non-medical: Not on file   Tobacco Use    Smoking status: Former Smoker    Smokeless tobacco: Never Used   Substance and Sexual Activity    Alcohol use: Yes    Drug use: No    Sexual activity: Not on file   Lifestyle    Physical activity     Days per week: Not on file     Minutes per session: Not on file    Stress: Not on file   Relationships    Social connections     Talks on phone: Not on file     Gets together: Not on file     Attends Protestant service: Not on file     Active member of club or organization: Not on file     Attends meetings of clubs or organizations: Not on file     Relationship status: Not on file    Intimate partner violence     Fear of current or ex partner: Not on file     Emotionally abused: Not on file     Physically abused: Not on file     Forced sexual activity: Not on file   Other Topics Concern    Not on file   Social History Narrative    Not on file       Current Outpatient Medications   Medication Sig Dispense Refill    metoprolol succinate (TOPROL-XL) 100 mg tablet Take 100 mg by mouth daily.  levothyroxine (SYNTHROID) 150 mcg tablet Take 150 mcg by mouth Daily (before breakfast).  ROSUVASTATIN CALCIUM (CRESTOR PO) Take 10 mg by mouth daily.  fluocinoNIDE (LIDEX) 0.05 % topical cream       hydrOXYzine HCl (ATARAX) 25 mg tablet       topiramate (TOPAMAX) 25 mg tablet 1 tab PO QHS (Patient not taking: Reported on 2/15/2018) 90 Tab 1    topiramate (TOPAMAX) 50 mg tablet TAKE 1 TABLET AT BEDTIME (Patient not taking: Reported on 2/15/2018) 90 Tab 0    METOPROLOL SUCCINATE (TOPROL XL PO) Take  by mouth.          Allergies   Allergen Reactions    Atorvastatin Myalgia    Statins-Hmg-Coa Reductase Inhibitors Myalgia          PHYSICAL EXAMINATION    Visit Vitals  BP (!) 159/81 (BP 1 Location: Left arm, BP Patient Position: Sitting)   Pulse 65   Temp 97.3 °F (36.3 °C)   Resp 18   Ht 6' (1.829 m)   Wt 319 lb 6.4 oz (144.9 kg)   SpO2 96%   BMI 43.32 kg/m²       CONSTITUTIONAL: NAD, A&O x 3  SENSATION: Intact to light touch throughout  RANGE OF MOTION: The patient has full passive range of motion in all four extremities. MOTOR:  Straight Leg Raise: Negative, bilateral                 Hip Flex Knee Ext Knee Flex Ankle DF GTE Ankle PF Tone   Right +4/5 +4/5 +4/5 +4/5 +4/5 +4/5 +4/5   Left +4/5 +4/5 +4/5 +4/5 +4/5 +4/5 +4/5       ASSESSMENT   Diagnoses and all orders for this visit:    1. Spinal stenosis of lumbar region, unspecified whether neurogenic claudication present    2. DDD (degenerative disc disease), lumbar    3. Osteoarthritis of right hip, unspecified osteoarthritis type    4. HNP (herniated nucleus pulposus), lumbar      IMPRESSION AND PLAN:  Patient returns to the office today with c/o low back pain. Multiple treatment options were discussed. He did not think his remaining pain complaints were severe enough to warrant additional workup/treatment at this time. Patient is neurologically intact. I will see the patient back prn. Written by Adry Hernandez, as dictated by Maximino Baptiste MD  I examined the patient, reviewed and agree with the note.

## 2020-10-01 ENCOUNTER — OFFICE VISIT (OUTPATIENT)
Dept: ORTHOPEDIC SURGERY | Age: 75
End: 2020-10-01
Payer: MEDICARE

## 2020-10-01 VITALS
DIASTOLIC BLOOD PRESSURE: 81 MMHG | HEIGHT: 72 IN | RESPIRATION RATE: 18 BRPM | WEIGHT: 315 LBS | TEMPERATURE: 97.3 F | OXYGEN SATURATION: 96 % | SYSTOLIC BLOOD PRESSURE: 159 MMHG | BODY MASS INDEX: 42.66 KG/M2 | HEART RATE: 65 BPM

## 2020-10-01 DIAGNOSIS — M48.061 SPINAL STENOSIS OF LUMBAR REGION, UNSPECIFIED WHETHER NEUROGENIC CLAUDICATION PRESENT: Primary | ICD-10-CM

## 2020-10-01 DIAGNOSIS — M51.36 DDD (DEGENERATIVE DISC DISEASE), LUMBAR: ICD-10-CM

## 2020-10-01 DIAGNOSIS — M16.11 OSTEOARTHRITIS OF RIGHT HIP, UNSPECIFIED OSTEOARTHRITIS TYPE: ICD-10-CM

## 2020-10-01 DIAGNOSIS — M51.26 HNP (HERNIATED NUCLEUS PULPOSUS), LUMBAR: ICD-10-CM

## 2020-10-01 PROCEDURE — G8427 DOCREV CUR MEDS BY ELIG CLIN: HCPCS | Performed by: PHYSICAL MEDICINE & REHABILITATION

## 2020-10-01 PROCEDURE — G8417 CALC BMI ABV UP PARAM F/U: HCPCS | Performed by: PHYSICAL MEDICINE & REHABILITATION

## 2020-10-01 PROCEDURE — 3017F COLORECTAL CA SCREEN DOC REV: CPT | Performed by: PHYSICAL MEDICINE & REHABILITATION

## 2020-10-01 PROCEDURE — 99213 OFFICE O/P EST LOW 20 MIN: CPT | Performed by: PHYSICAL MEDICINE & REHABILITATION

## 2020-10-01 PROCEDURE — G8432 DEP SCR NOT DOC, RNG: HCPCS | Performed by: PHYSICAL MEDICINE & REHABILITATION

## 2020-10-01 PROCEDURE — 1101F PT FALLS ASSESS-DOCD LE1/YR: CPT | Performed by: PHYSICAL MEDICINE & REHABILITATION

## 2020-10-01 PROCEDURE — G8536 NO DOC ELDER MAL SCRN: HCPCS | Performed by: PHYSICAL MEDICINE & REHABILITATION

## 2020-10-01 NOTE — LETTER
10/1/20 Patient: Derek Barrera YOB: 1945 Date of Visit: 10/1/2020 Sue Mclaughlin MD 
[address] [place] [address] VIA Facsimile: [phone] Dear Sue Mclaughlin MD, Thank you for referring Mr. James Sexton to [address] for evaluation. My notes for this consultation are attached. If you have questions, please do not hesitate to call me. I look forward to following your patient along with you. Sincerely, Betty Cannon MD

## 2020-10-02 ENCOUNTER — OFFICE VISIT (OUTPATIENT)
Dept: ORTHOPEDIC SURGERY | Age: 75
End: 2020-10-02
Payer: MEDICARE

## 2020-10-02 VITALS
DIASTOLIC BLOOD PRESSURE: 78 MMHG | BODY MASS INDEX: 42.66 KG/M2 | RESPIRATION RATE: 16 BRPM | HEART RATE: 62 BPM | HEIGHT: 72 IN | SYSTOLIC BLOOD PRESSURE: 146 MMHG | TEMPERATURE: 97.3 F | WEIGHT: 315 LBS | OXYGEN SATURATION: 97 %

## 2020-10-02 DIAGNOSIS — R20.0 RIGHT LEG NUMBNESS: ICD-10-CM

## 2020-10-02 DIAGNOSIS — M25.551 RIGHT HIP PAIN: Primary | ICD-10-CM

## 2020-10-02 DIAGNOSIS — M16.11 PRIMARY OSTEOARTHRITIS OF RIGHT HIP: ICD-10-CM

## 2020-10-02 PROCEDURE — G8417 CALC BMI ABV UP PARAM F/U: HCPCS | Performed by: ORTHOPAEDIC SURGERY

## 2020-10-02 PROCEDURE — G8427 DOCREV CUR MEDS BY ELIG CLIN: HCPCS | Performed by: ORTHOPAEDIC SURGERY

## 2020-10-02 PROCEDURE — 1101F PT FALLS ASSESS-DOCD LE1/YR: CPT | Performed by: ORTHOPAEDIC SURGERY

## 2020-10-02 PROCEDURE — 3017F COLORECTAL CA SCREEN DOC REV: CPT | Performed by: ORTHOPAEDIC SURGERY

## 2020-10-02 PROCEDURE — 99214 OFFICE O/P EST MOD 30 MIN: CPT | Performed by: ORTHOPAEDIC SURGERY

## 2020-10-02 PROCEDURE — G8536 NO DOC ELDER MAL SCRN: HCPCS | Performed by: ORTHOPAEDIC SURGERY

## 2020-10-02 PROCEDURE — G8510 SCR DEP NEG, NO PLAN REQD: HCPCS | Performed by: ORTHOPAEDIC SURGERY

## 2020-10-02 NOTE — PROGRESS NOTES
Patient: Thom Glynn                MRN: [id number]       SSN: [id number]  YOB: 1945        AGE: 70 y.o. SEX: male  Body mass index is 43.4 kg/m². PCP: Aquiles Evangelista MD  10/02/20  HISTORY:  I had the pleasure of reviewing Mr. Tati Allen. Mr. Tati Allen just underwent intraarticular injection for his right hip. It has made a dramatic improvement. He is status post left total hip replacement back in 2010 and has done extremely well with that. He has had some back problems over the years. He does have some known peripheral vascular disease and mild peripheral edema and does sometimes wear stockings as well. Denies fevers or chills, otherwise has been feeling well, and he is quite pleased with the injection so far. The pain is just mild, but he is noticeably stiff. He feels somewhat deconditioned and would like to do some strengthening exercise program as well, which we will be happy to arrange for him. REVIEW OF SYSTEMS:  Negative for fevers or chills, no shortness of breath or chest pain, and otherwise he is feeling well. PHYSICAL EXAMINATION:  On examination today, he is walking much better, minimally antalgic component to the gait. The left hip is noncontributory. The right hip is fairly stiff with only a jog of internal rotation. Calf is non tender. Lorne Lias' sign negative. He has about 1/2+ pitting edema and his BMI today is 43 1/2. RADIOGRAPHS:  I did review his previous x-rays confirming advancing to severe arthritis, especially anteriorly, still with a little bit of cartilage remaining superiorly. PLAN:  I would recommend a gentle conditioning program for him, eventual total hip replacement. We can repeat the injection in about four months as needed. I will see him back in about six weeks to see how things are going. It has been a pleasure to share in his care. We will certainly try to maximize his nonoperative management.     C:     Teri Munson Ha Buitrago MD          REVIEW OF SYSTEMS:      CON: negative  EYE: negative   ENT: negative  RESP: negative  GI:    negative   :  negative  MSK: Positive  A twelve point review of systems was completed, positives noted and all other systems were reviewed and are negative          Past Medical History:   Diagnosis Date    Arrhythmia     AFIB    Hypertension     Ill-defined condition     Graves Disease       Family History   Problem Relation Age of Onset    No Known Problems Mother     Diabetes Father     Diabetes Sister        Current Outpatient Medications   Medication Sig Dispense Refill    metoprolol succinate (TOPROL-XL) 100 mg tablet Take 100 mg by mouth daily.  levothyroxine (SYNTHROID) 150 mcg tablet Take 150 mcg by mouth Daily (before breakfast).  ROSUVASTATIN CALCIUM (CRESTOR PO) Take 10 mg by mouth daily.  fluocinoNIDE (LIDEX) 0.05 % topical cream       hydrOXYzine HCl (ATARAX) 25 mg tablet       topiramate (TOPAMAX) 25 mg tablet 1 tab PO QHS (Patient not taking: Reported on 2/15/2018) 90 Tab 1    topiramate (TOPAMAX) 50 mg tablet TAKE 1 TABLET AT BEDTIME (Patient not taking: Reported on 2/15/2018) 90 Tab 0    METOPROLOL SUCCINATE (TOPROL XL PO) Take  by mouth.          Allergies   Allergen Reactions    Atorvastatin Myalgia    Statins-Hmg-Coa Reductase Inhibitors Myalgia       Past Surgical History:   Procedure Laterality Date    HX HERNIA REPAIR  1990    HX THYROIDECTOMY      SINUS SURGERY PROC UNLISTED  2011    TOTAL HIP ARTHROPLASTY Left 2011    by Dr. [facility] History     Socioeconomic History    Marital status:      Spouse name: Not on file    Number of children: Not on file    Years of education: Not on file    Highest education level: Not on file   Occupational History    Not on file   Social Needs    Financial resource strain: Not on file    Food insecurity     Worry: Not on file     Inability: Not on file    Transportation needs     Medical: Not on file     Non-medical: Not on file   Tobacco Use    Smoking status: Former Smoker    Smokeless tobacco: Never Used   Substance and Sexual Activity    Alcohol use: Yes    Drug use: No    Sexual activity: Not on file   Lifestyle    Physical activity     Days per week: Not on file     Minutes per session: Not on file    Stress: Not on file   Relationships    Social connections     Talks on phone: Not on file     Gets together: Not on file     Attends Denominational service: Not on file     Active member of club or organization: Not on file     Attends meetings of clubs or organizations: Not on file     Relationship status: Not on file    Intimate partner violence     Fear of current or ex partner: Not on file     Emotionally abused: Not on file     Physically abused: Not on file     Forced sexual activity: Not on file   Other Topics Concern    Not on file   Social History Narrative    Not on file       Visit Vitals  BP (!) 146/78 (BP 1 Location: Left arm, BP Patient Position: Sitting)   Pulse 62   Temp 97.3 °F (36.3 °C) (Temporal)   Resp 16   Ht 6' (1.829 m)   Wt 320 lb (145.2 kg)   SpO2 97%   BMI 43.40 kg/m²         PHYSICAL EXAMINATION:  GENERAL: Alert and oriented x3, in no acute distress, well-developed, well-nourished, afebrile. HEART: No JVD. EYES: No scleral icterus   NECK: No significant lymphadenopathy   LUNGS: No respiratory compromise or indrawing  ABDOMEN: Soft, non-tender, non-distended. Electronically signed by:  Kendal Duvall MD

## 2020-10-05 ENCOUNTER — HOSPITAL ENCOUNTER (OUTPATIENT)
Dept: PHYSICAL THERAPY | Age: 75
Discharge: HOME | End: 2020-10-05
Payer: MEDICARE

## 2020-10-05 PROCEDURE — 97162 PT EVAL MOD COMPLEX 30 MIN: CPT

## 2020-10-05 PROCEDURE — 97140 MANUAL THERAPY 1/> REGIONS: CPT

## 2020-10-05 PROCEDURE — 97110 THERAPEUTIC EXERCISES: CPT

## 2020-10-05 NOTE — PROGRESS NOTES
[facility] [address]  [place], [address]  [phone] fax    Plan of Care/ Statement of Necessity for Physical Therapy Services    Patient name: Sonia Moore Start of Care: 10/5/2020   Referral source: Jaspal Varner MD : 1945    Medical Diagnosis: Pain in right hip [M25.551]  Payor: Jamia Linker / Plan: [place] MED[place] A & B / Product Type: Medicare /  Onset Date: 2020    Treatment Diagnosis: right hip pain    Prior Hospitalization: see medical history Provider#: [id number]   Medications: Verified on Patient summary List    Comorbidities: arthritis, back pain, BMI >30, hearing impairment, high blood pressure, osteoporosis, prior surgery, prosthesis/implants, sleep dysfunction    Prior Level of Function: independent with ambulation and ADLs, working out (biking/swimming)       The Plan of Care and following information is based on the information from the initial evaluation. Assessment/ key information: Patient is a 70year old male with reports of right hip pain that began in January. He reports receiving a cortisone injection in August and has had good relief since. Prior to onset of pain, he was independent with mobility and ADLs and was attending the gym for bike/swimming workouts. Patient currently has difficulty with standing for prolonged periods, ambulation, stair negotiation, and performance of household activities. He presents with decreased right LE strength, decreased right hip flexion, decreased tissue extensibility in bilateral LE, impaired balance, and decreased overall functional mobility due to impairments.  Patient will continue to benefit from skilled PT services to modify and progress therapeutic interventions, address functional mobility deficits, address ROM deficits, address strength deficits, analyze and address soft tissue restrictions, analyze and cue movement patterns, analyze and modify body mechanics/ergonomics, assess and modify postural abnormalities, address imbalance/dizziness and instruct in home and community integration to attain remaining goals. Evaluation Complexity History HIGH Complexity :3+ comorbidities / personal factors will impact the outcome/ POC ; Examination MEDIUM Complexity : 3 Standardized tests and measures addressing body structure, function, activity limitation and / or participation in recreation  ;Presentation MEDIUM Complexity : Evolving with changing characteristics  ; Clinical Decision Making MEDIUM Complexity : FOTO score of 26-74  Overall Complexity Rating: MEDIUM  Problem List: pain affecting function, decrease ROM, decrease strength, impaired gait/ balance, decrease ADL/ functional abilitiies, decrease activity tolerance, decrease flexibility/ joint mobility and decrease transfer abilities   Treatment Plan may include any combination of the following: Therapeutic exercise, Therapeutic activities, Neuromuscular re-education, Physical agent/modality, Gait/balance training, Manual therapy, Patient education, Self Care training, Functional mobility training, Home safety training and Stair training  Patient / Family readiness to learn indicated by: asking questions, trying to perform skills and interest  Persons(s) to be included in education: patient (P)  Barriers to Learning/Limitations: None  Patient Goal (s): to walk again  Patient Self Reported Health Status: fair  Rehabilitation Potential: fair    Short Term Goals: To be accomplished in 2 weeks:  1. Patient will report performance of home exercise program 4 of 7 days in the next week demonstrating compliance to therapy program and progress towards independent management of symptoms. Long Term Goals: To be accomplished in 4 weeks:  1. Patient will report right hip pain <2/10 with walking community distance showing improvements in functional mobility and return to prior level of function.   2. Patient will increase right lower extremity strength to 5/5 for greater ease with transfers, getting in/out of bed, and improved mobility. 3. Patient will improve 5x sit to stand score to <15 seconds to decrease fall risk and improved muscular endurance. 4. Patient will increase hip flexion to 105 degrees for greater ease with donning/doffing shoes, standing from low surface, and getting in/out of vehicle. 5. Patient will improve FOTO score to >/= 61 to demonstrate return to prior level of function and to improve patients ability to perform household activities. Frequency / Duration: Patient to be seen 2 times per week for 4 weeks. Patient/ Caregiver education and instruction: Diagnosis, prognosis, activity modification, exercises and other heat/cold pack application for symptom management   [x]  Plan of care has been reviewed with PTA    Certification Period: 10/5/2020 - 11/3/2020  Rishi Feldman PT, DPT 10/5/2020 11:34 AM    ________________________________________________________________________    I certify that the above Therapy Services are being furnished while the patient is under my care. I agree with the treatment plan and certify that this therapy is necessary.     [de-identified] Signature:____________________  Date:____________Time: _________    Please sign and return to [facility]  [address]  [place], [address]  [phone] fax

## 2020-10-05 NOTE — PROGRESS NOTES
PT DAILY TREATMENT NOTE 10-18    Patient Name: Martin Herrera  Date:10/5/2020  : 1945  [x]  Patient  Verified  Payor: [place] MEDICARE / Plan: [place] MEDICARE PART A & B / Product Type: Medicare /    In time:1045  Out time:1130  Total Treatment Time (min): 45  Visit #: 1 of 8    Medicare/BCBS Only   Total Timed Codes (min):  25 1:1 Treatment Time:  45       Treatment Area: Pain in right hip [M25.551]    SUBJECTIVE  Pain Level (0-10 scale): 0  Any medication changes, allergies to medications, adverse drug reactions, diagnosis change, or new procedure performed?: [x] No    [] Yes (see summary sheet for update)  Subjective functional status/changes:   [] No changes reported  Patient reports right hip pain starting in 2020 that has progressively worsened. In 2020, he received a cortisone injection and has had good relief since. Patient reports difficulty standing for periods of time, ambulating household distances, and transfers. Patient reporting relief with sitting/rest.     OBJECTIVE    20 min [x]Eval                  []Re-Eval       15 min Therapeutic Exercise:  [] See flow sheet: Patient provided HEP today. Reviewed proper form and purpose of each exercise. Patient educated on importance of compliance to HEP for improved carryover between sessions. Patient educated on use of cold/hot pack application for symptom management.     Rationale: increase ROM and increase strength to improve the patients ability to perform ADLs and ambulation with greater ease     10 min Manual Therapy:  Left sidelying - STM/DTM to right glute max, glute med, TFL   Rationale: decrease pain, decrease trigger points and increase postural awareness to improve overall functional mobility           With   [x] TE   [] TA   [] neuro   [] other: Patient Education: [x] Review HEP    [] Progressed/Changed HEP based on:   [] positioning   [] body mechanics   [] transfers   [x] heat/ice application    [] other:      Other Objective/Functional Measures: see paper chart for evaluation form     Pain Level (0-10 scale) post treatment: 0    ASSESSMENT/Changes in Function: Patient is a 70year old male with reports of right hip pain that began in January. He reports receiving a cortisone injection in August and has had good relief since. Prior to onset of pain, he was independent with mobility and ADLs and was attending the gym for bike/swimming workouts. Patient currently has difficulty with standing for prolonged periods, ambulation, stair negotiation, and performance of household activities. He presents with decreased right LE strength, decreased right hip flexion, decreased tissue extensibility in bilateral LE, impaired balance, and decreased overall functional mobility due to impairments. Patient will continue to benefit from skilled PT services to modify and progress therapeutic interventions, address functional mobility deficits, address ROM deficits, address strength deficits, analyze and address soft tissue restrictions, analyze and cue movement patterns, analyze and modify body mechanics/ergonomics, assess and modify postural abnormalities, address imbalance/dizziness and instruct in home and community integration to attain remaining goals. [x]  See Plan of Care  []  See progress note/recertification  []  See Discharge Summary         Progress towards goals / Updated goals:  Short Term Goals: To be accomplished in 2 weeks:  1. Patient will report performance of home exercise program 4 of 7 days in the next week demonstrating compliance to therapy program and progress towards independent management of symptoms. Eval: provided HEP     Long Term Goals: To be accomplished in 4 weeks:  1. Patient will report right hip pain <2/10 with walking community distance showing improvements in functional mobility and return to prior level of function. Eval: 6/10   2.  Patient will increase right lower extremity strength to 5/5 for greater ease with transfers, getting in/out of bed, and improved mobility. Eval: 4+ grossly   3. Patient will improve 5x sit to stand score to <15 seconds to decrease fall risk and improved muscular endurance. Eval: 20 seconds  4. Patient will increase right hip flexion to 105 degrees for greater ease with donning/doffing shoes, standing from low surface, and getting in/out of vehicle. Eval: 90 deg  5. Patient will improve FOTO score to >/= 61 to demonstrate return to prior level of function and to improve patients ability to perform household activities.    Eval: 52    PLAN  [x]  Upgrade activities as tolerated     []  Continue plan of care  []  Update interventions per flow sheet       []  Discharge due to:_  []  Other:_      Vicki Cardenas, PT, DPT 10/5/2020  11:34 AM    Future Appointments   Date Time Provider L[place] Corrales   10/7/2020 12:45 PM [facility]   10/12/2020 10:00 AM [facility]   10/15/2020  9:30 AM Suri Fees [facility]   10/20/2020  8:[facility]   10/22/2020  8:30 AM [facility]   10/27/2020  7:45 AM [facility]   10/29/2020  9:30 AM [facility]   11/20/2020  9:00 AM Garrett White MD [facility]

## 2020-10-07 ENCOUNTER — HOSPITAL ENCOUNTER (OUTPATIENT)
Dept: PHYSICAL THERAPY | Age: 75
Discharge: HOME | End: 2020-10-07
Payer: MEDICARE

## 2020-10-07 PROCEDURE — 97140 MANUAL THERAPY 1/> REGIONS: CPT

## 2020-10-07 PROCEDURE — 97110 THERAPEUTIC EXERCISES: CPT

## 2020-10-07 NOTE — PROGRESS NOTES
PT DAILY TREATMENT NOTE 10-18    Patient Name: Cory Postin  Date:10/7/2020  : 1945  [x]  Patient  Verified  Payor: VA MEDICARE / Plan: VA MEDICARE PART A & B / Product Type: Medicare /    In time:12:45  Out time:1:42  Total Treatment Time (min): 62  Visit #: 2 of 8    Medicare/BCBS Only   Total Timed Codes (min):  47 1:1 Treatment Time:  52       Treatment Area: Pain in right hip [M25.551]    SUBJECTIVE  Pain Level (0-10 scale): 1-2  Any medication changes, allergies to medications, adverse drug reactions, diagnosis change, or new procedure performed?: [x] No    [] Yes (see summary sheet for update)  Subjective functional status/changes:   [] No changes reported  Pt reports he was having some discomfort in his right leg today. OBJECTIVE    Modality rationale: decrease pain and increase tissue extensibility to improve the patients ability to perform ADL's with ease.    Min Type Additional Details    [] Estim:  []Unatt       []IFC  []Premod                        []Other:  []w/ice   []w/heat  Position:  Location:    [] Estim: []Att    []TENS instruct  []NMES                    []Other:  []w/US   []w/ice   []w/heat  Position:  Location:    []  Traction: [] Cervical       []Lumbar                       [] Prone          []Supine                       []Intermittent   []Continuous Lbs:  [] before manual  [] after manual    []  Ultrasound: []Continuous   [] Pulsed                           []1MHz   []3MHz W/cm2:  Location:    []  Iontophoresis with dexamethasone         Location: [] Take home patch   [] In clinic   10 []  Ice     [x]  heat  []  Ice massage  []  Laser   []  Anodyne Position: supine  Location: L/S    []  Laser with stim  []  Other:  Position:  Location:    []  Vasopneumatic Device Pressure:       [] lo [] med [] hi   Temperature: [] lo [] med [] hi   [] Skin assessment post-treatment:  []intact []redness- no adverse reaction    []redness - adverse reaction:         35 min Therapeutic Exercise:  [x] See flow sheet :   Rationale: increase ROM and increase strength to improve the patients ability to perform functional task with ease. 12 min Manual Therapy:   Left sidelying - STM/DTM to right glute max, glute med, TFL. Supine- STM to right hip flexor, Prone- STM to L/S paraspinals   Rationale: decrease pain, increase ROM, increase tissue extensibility and decrease trigger points to improve functional mobility with ease. With   [] TE   [] TA   [] neuro   [] other: Patient Education: [x] Review HEP    [] Progressed/Changed HEP based on:   [] positioning   [] body mechanics   [] transfers   [] heat/ice application    [] other:      Other Objective/Functional Measures: initiated therex per flow sheet     Pain Level (0-10 scale) post treatment: 1    ASSESSMENT/Changes in Function:   First f/u session with pt putting forth good effort with therex and requiring some cueing with form for exercises. Pt displays increased trunk flexion due to tightness and pain in his right hip flexors with extension noted during standing exercises. Decreased mobility noted with hip abd exercises on the right due to some discomfort and weakness. Pt reports HEP understanding and compliance. No real change in pain post treatment. Patient will continue to benefit from skilled PT services to modify and progress therapeutic interventions, address functional mobility deficits, address ROM deficits, address strength deficits, analyze and address soft tissue restrictions, analyze and cue movement patterns, analyze and modify body mechanics/ergonomics and assess and modify postural abnormalities to attain remaining goals. [x]  See Plan of Care  []  See progress note/recertification  []  See Discharge Summary         Progress towards goals / Updated goals:  Short Term Goals: To be accomplished in 2 weeks:  1.  Patient will report performance of home exercise program 4 of 7 days in the next week demonstrating compliance to therapy program and progress towards independent management of symptoms. Eval: provided HEP    Current: Met 10/7/20 Pt reports understanding and compliance.     Long Term Goals: To be accomplished in 4 weeks:  1. Patient will report right hip pain <2/10 with walking community distance showing improvements in functional mobility and return to prior level of function. Eval: 6/10   2. Patient will increase right lower extremity strength to 5/5 for greater ease with transfers, getting in/out of bed, and improved mobility. Eval: 4+ grossly   3. Patient will improve 5x sit to stand score to <15 seconds to decrease fall risk and improved muscular endurance. Eval: 20 seconds  4. Patient will increase right hip flexion to 105 degrees for greater ease with donning/doffing shoes, standing from low surface, and getting in/out of vehicle. Eval: 90 deg  5. Patient will improve FOTO score to >/= 61 to demonstrate return to prior level of function and to improve patients ability to perform household activities.               Eval: 52  PLAN  []  Upgrade activities as tolerated     [x]  Continue plan of care  []  Update interventions per flow sheet       []  Discharge due to:_  []  Other:_      Leonarda Em PTA 10/7/2020  12:47 PM    Future Appointments   Date Time Provider L[place] Corrales   10/12/2020 10:00 AM Curlee Balo [facility]   10/15/2020  9:30 AM Curlee Balo [facility]   10/20/2020  8:15 AM Mickey Winters [facility]   10/22/2020  8:30 AM Arlyn Alexander PTA [facility]   10/27/2020  7:45 AM Tonie Garibay PTA [facility]   10/29/2020  9:30 AM Mickey Winters [facility]   11/20/2020  9:00 AM Thien Edwards MD [facility]

## 2020-10-12 ENCOUNTER — HOSPITAL ENCOUNTER (OUTPATIENT)
Dept: PHYSICAL THERAPY | Age: 75
Discharge: HOME | End: 2020-10-12
Payer: MEDICARE

## 2020-10-12 PROCEDURE — 97140 MANUAL THERAPY 1/> REGIONS: CPT

## 2020-10-12 PROCEDURE — 97110 THERAPEUTIC EXERCISES: CPT

## 2020-10-12 PROCEDURE — 97116 GAIT TRAINING THERAPY: CPT

## 2020-10-12 NOTE — PROGRESS NOTES
PT DAILY TREATMENT NOTE 10-18    Patient Name: Erica Shaikh  Date:10/12/2020  : 1945  [x]  Patient  Verified  Payor: VA MEDICARE / Plan: VA MEDICARE PART A & B / Product Type: Medicare /    In time: 955  Out time:1047  Total Treatment Time (min): 52  Visit #: 3 of 8    Medicare/BCBS Only   Total Timed Codes (min):  42 1:1 Treatment Time:  42       Treatment Area: Pain in right hip [M25.551]    SUBJECTIVE  Pain Level (0-10 scale): 0  Any medication changes, allergies to medications, adverse drug reactions, diagnosis change, or new procedure performed?: [x] No    [] Yes (see summary sheet for update)  Subjective functional status/changes:   [] No changes reported  Patient reporting no pain today just some \"soreness\".      OBJECTIVE    Modality rationale: decrease pain and increase tissue extensibility to improve the patients ability to improve overall functional mobility   Min Type Additional Details    [] Estim:  []Unatt       []IFC  []Premod                        []Other:  []w/ice   []w/heat  Position:  Location:    [] Estim: []Att    []TENS instruct  []NMES                    []Other:  []w/US   []w/ice   []w/heat  Position:  Location:    []  Traction: [] Cervical       []Lumbar                       [] Prone          []Supine                       []Intermittent   []Continuous Lbs:  [] before manual  [] after manual    []  Ultrasound: []Continuous   [] Pulsed                           []1MHz   []3MHz W/cm2:  Location:    []  Iontophoresis with dexamethasone         Location: [] Take home patch   [] In clinic   10 []  Ice     [x]  heat  []  Ice massage  []  Laser   []  Anodyne Position: supine  Location:lumbar spine     []  Laser with stim  []  Other:  Position:  Location:    []  Vasopneumatic Device Pressure:       [] lo [] med [] hi   Temperature: [] lo [] med [] hi   [x] Skin assessment post-treatment:  [x]intact []redness- no adverse reaction    []redness - adverse reaction:       22 min Therapeutic Exercise:  [x] See flow sheet :   Rationale: increase ROM, increase strength and improve coordination to improve the patients ability to perform ADLs, self care tasks, and ambulate with greater ease     8 min Manual Therapy:  Left side lying - STM/DTM to right glute med, max, and TFL   Rationale: decrease pain, increase tissue extensibility, decrease trigger points and increase postural awareness to ambulate and perform ADLs with greater ease      12 min Gait Training:  Side stepping, retro walking, tandem walking, step taps    Rationale: strengthening, endurance, and coordination for improved ambulation and stair negotiation         With   [] TE   [] TA   [] neuro   [] other: Patient Education: [x] Review HEP    [] Progressed/Changed HEP based on:   [] positioning   [] body mechanics   [] transfers   [] heat/ice application    [] other:         Pain Level (0-10 scale) post treatment: 0    ASSESSMENT/Changes in Function: Patient reporting no pain in right hip but does have some thoracic/lumbar pain that is bothering him today. He was able to tolerate addition of gait sequence to further progress balance and functional ambulation. He continues to be challenged by balance exercises. STM/DTM performed to right glute max/med with patient reporting decreased \"tenderness\" and decreased hypertonicity noted. Patient denied pain at end of session. Patient will continue to benefit from skilled PT services to modify and progress therapeutic interventions, address functional mobility deficits, address ROM deficits, address strength deficits, analyze and address soft tissue restrictions, analyze and cue movement patterns, analyze and modify body mechanics/ergonomics, assess and modify postural abnormalities, address imbalance/dizziness and instruct in home and community integration to attain remaining goals.      [x]  See Plan of Care  []  See progress note/recertification  []  See Discharge Summary Progress towards goals / Updated goals:  Short Term Goals: To be accomplished in 2 weeks:  1. Patient will report performance of home exercise program 4 of 7 days in the next week demonstrating compliance to therapy program and progress towards independent management of symptoms.             Eval: provided HEP               Current: Met 10/7/20 Pt reports understanding and compliance.     Long Term Goals: To be accomplished in 4 weeks:  1. Patient will report right hip pain <2/10 with walking community distance showing improvements in functional mobility and return to prior level of function.              Eval: 6/10    Current: Progressing 10/12/2020 - patient reporting no \"pain\" but weakness in LE with ambulation but improving since initial evaluation. 2. Patient will increase right lower extremity strength to 5/5 for greater ease with transfers, getting in/out of bed, and improved mobility.             Eval: 4+ grossly   3. Patient will improve 5x sit to stand score to <15 seconds to decrease fall risk and improved muscular endurance.               Eval: 20 seconds  4. Patient will increase right hip flexion to 105 degrees for greater ease with donning/doffing shoes, standing from low surface, and getting in/out of vehicle.                Eval: 90 deg  5. Patient will improve FOTO score to >/= 61 to demonstrate return to prior level of function and to improve patients ability to perform household activities.               Eval: 52    PLAN  [x]  Upgrade activities as tolerated     []  Continue plan of care  []  Update interventions per flow sheet       []  Discharge due to:_  []  Other:_      Robin Harris, PT, DPT 10/12/2020  9:49 AM    Future Appointments   Date Time Provider Lena Corrales   10/12/2020 10:00 AM Pattie Dominguez [facility]   10/15/2020  9:30 [facility]   10/20/2020  8:15 AM Brittany Hannon [facility]   10/22/2020  8:30 AM Michelle Hernandez PTA [facility] 10/27/2020  7:45 AM Payton Haynes, PTA [facility]   10/29/2020  9:30 AM Josiee Stager [facility]   11/20/2020  9:00 AM Dayanna Garcia MD [facility]

## 2020-10-15 ENCOUNTER — HOSPITAL ENCOUNTER (OUTPATIENT)
Dept: PHYSICAL THERAPY | Age: 75
Discharge: HOME | End: 2020-10-15
Payer: MEDICARE

## 2020-10-15 PROCEDURE — 97140 MANUAL THERAPY 1/> REGIONS: CPT

## 2020-10-15 PROCEDURE — 97110 THERAPEUTIC EXERCISES: CPT

## 2020-10-15 PROCEDURE — 97116 GAIT TRAINING THERAPY: CPT

## 2020-10-20 ENCOUNTER — HOSPITAL ENCOUNTER (OUTPATIENT)
Dept: PHYSICAL THERAPY | Age: 75
Discharge: HOME | End: 2020-10-20
Payer: MEDICARE

## 2020-10-20 PROCEDURE — 97116 GAIT TRAINING THERAPY: CPT

## 2020-10-20 PROCEDURE — 97110 THERAPEUTIC EXERCISES: CPT

## 2020-10-20 PROCEDURE — 97140 MANUAL THERAPY 1/> REGIONS: CPT

## 2020-10-20 NOTE — PROGRESS NOTES
PT DAILY TREATMENT NOTE 10-18    Patient Name: Yury Casas  Date:10/20/2020  : 1945  [x]  Patient  Verified  [place]or: [place] MED[place] / Plan: [place] MEDICARE PART A & B / Product Type: Medicare /    In time:816  Out time:912  Total Treatment Time (min): 56  Visit #: 5 of 8    Medicare/BCBS Only   Total Timed Codes (min):  46 1:1 Treatment Time:  46       Treatment Area: Pain in right hip [M25.551]    SUBJECTIVE  Pain Level (0-10 scale): 0  Any medication changes, allergies to medications, adverse drug reactions, diagnosis change, or new procedure performed?: [x] No    [] Yes (see summary sheet for update)  Subjective functional status/changes:   [] No changes reported  Patient denies pain in his hip today but continues to have some discomfort in his groin/adductors.      OBJECTIVE    Modality rationale: decrease pain and increase tissue extensibility to improve the patients ability to improve overall functional mobility    Min Type Additional Details    [] Estim:  []Unatt       []IFC  []Premod                        []Other:  []w/ice   []w/heat  Position:  Location:    [] Estim: []Att    []TENS instruct  []NMES                    []Other:  []w/US   []w/ice   []w/heat  Position:  Location:    []  Traction: [] Cervical       []Lumbar                       [] Prone          []Supine                       []Intermittent   []Continuous Lbs:  [] before manual  [] after manual    []  Ultrasound: []Continuous   [] Pulsed                           []1MHz   []3MHz W/cm2:  Location:    []  Iontophoresis with dexamethasone         Location: [] Take home patch   [] In clinic   10 []  Ice     [x]  heat  []  Ice massage  []  Laser   []  Anodyne Position: Supine   Location: low back/right hip     []  Laser with stim  []  Other:  Position:  Location:    []  Vasopneumatic Device Pressure:       [] lo [] med [] hi   Temperature: [] lo [] med [] hi   [x] Skin assessment post-treatment:  [x]intact []redness- no adverse reaction []redness - adverse reaction:       22 min Therapeutic Exercise:  [x] See flow sheet :   Rationale: increase ROM, increase strength and improve coordination to improve the patients ability to improve ambulation, stair negotiation, and performance of ADLs with greater ease     10 min Manual Therapy:  Left sidelying: DTM to right glutes, QL, paraspinals   Rationale: increase ROM and increase tissue extensibility to improve overall functional mobility     14 min Gait Training:  Side stepping, tandem walking, retro walking    Rationale: improve ability to ambulate with greater ease and decreased fall risk           With   [] TE   [] TA   [] neuro   [] other: Patient Education: [x] Review HEP    [] Progressed/Changed HEP based on:   [] positioning   [] body mechanics   [] transfers   [] heat/ice application    [] other:      Other Objective/Functional Measures: 5x sit to stand test = 13 seconds today     Pain Level (0-10 scale) post treatment: 0    ASSESSMENT/Changes in Function: Patient continuing to report right groin discomfort, however he was able to tolerate all therapy ithout exacerbation of symptoms. Patient progressed step ups from 4 inch to 6 inch step to further progress LE strengthening. Progressed SLS to uneven surface. Patient challenged requiring frequent UE support to prevent loss of balance. SKTC with swiss ball added today to further progress hip flexion range of motion. Patient will continue to benefit from skilled PT services to modify and progress therapeutic interventions, address functional mobility deficits, address ROM deficits, address strength deficits, analyze and address soft tissue restrictions, analyze and cue movement patterns, analyze and modify body mechanics/ergonomics, assess and modify postural abnormalities, address imbalance/dizziness and instruct in home and community integration to attain remaining goals.      [x]  See Plan of Care  []  See progress note/recertification  [] See Discharge Summary         Progress towards goals / Updated goals:  Short Term Goals: To be accomplished in 2 weeks:  1. Patient will report performance of home exercise program 4 of 7 days in the next week demonstrating compliance to therapy program and progress towards independent management of symptoms.             Eval: provided HEP               PSFSBWO: Met 10/7/20 Pt reports understanding and compliance.     Long Term Goals: To be accomplished in 4 weeks:  1. Patient will report right hip pain <2/10 with walking community distance showing improvements in functional mobility and return to prior level of function.              Eval: 6/10               Current: Progressing 10/12/2020 - patient reporting no \"pain\" but weakness in LE with ambulation but improving since initial evaluation.    2. Patient will increase right lower extremity strength to 5/5 for greater ease with transfers, getting in/out of bed, and improved mobility.             Eval: 4+ grossly   3. Patient will improve 5x sit to stand score to <15 seconds to decrease fall risk and improved muscular endurance.               Eval: 20 seconds   Current: MET 10/20/2020 - 13 seconds   4. Patient will increase right hip flexion to 105 degrees for greater ease with donning/doffing shoes, standing from low surface, and getting in/out of vehicle.                Eval: 90 deg  5. Patient will improve FOTO score to >/= 61 to demonstrate return to prior level of function and to improve patients ability to perform household activities.               JRMF: 42    PLAN  [x]  Upgrade activities as tolerated     []  Continue plan of care  []  Update interventions per flow sheet       []  Discharge due to:_  []  Other:_      Connie Tate, PT, DPT 10/20/2020  8:19 AM    Future Appointments   Date Time Provider Lena Corrales   10/22/2020  8:30 [facility]   10/27/2020  7:45 AM [facility]   10/29/2020  9:30 AM Kranthi Anderson [facility]   11/20/2020  9:00 AM Giana Arciniega MD [facility]

## 2020-10-22 ENCOUNTER — HOSPITAL ENCOUNTER (OUTPATIENT)
Dept: PHYSICAL THERAPY | Age: 75
Discharge: HOME | End: 2020-10-22
Payer: MEDICARE

## 2020-10-22 PROCEDURE — 97112 NEUROMUSCULAR REEDUCATION: CPT

## 2020-10-22 PROCEDURE — 97140 MANUAL THERAPY 1/> REGIONS: CPT

## 2020-10-22 PROCEDURE — 97110 THERAPEUTIC EXERCISES: CPT

## 2020-10-22 NOTE — PROGRESS NOTES
PT DAILY TREATMENT NOTE 10-18    Patient Name: Thom Glynn  Date:10/22/2020  : 1945  [x]  Patient  Verified  Payor: [place] MEDICARE / Plan: [place] MEDICARE PART A & B / Product Type: Medicare /    In time:8:30  Out time:9:27  Total Treatment Time (min): 62  Visit #: 6 of 8    Medicare/BCBS Only   Total Timed Codes (min):  47 1:1 Treatment Time:  39       Treatment Area: Pain in right hip [M25.551]    SUBJECTIVE  Pain Level (0-10 scale): 0 (sore)  Any medication changes, allergies to medications, adverse drug reactions, diagnosis change, or new procedure performed?: [x] No    [] Yes (see summary sheet for update)  Subjective functional status/changes:   [] No changes reported  Pt reports he was doing some work in his tub and around the house yesterday and he may have over done it because he has pain in the right groin area. OBJECTIVE    Modality rationale: decrease pain and increase tissue extensibility to improve the patients ability to perform ADL's with ease.    Min Type Additional Details    [] Estim:  []Unatt       []IFC  []Premod                        []Other:  []w/ice   []w/heat  Position:  Location:    [] Estim: []Att    []TENS instruct  []NMES                    []Other:  []w/US   []w/ice   []w/heat  Position:  Location:    []  Traction: [] Cervical       []Lumbar                       [] Prone          []Supine                       []Intermittent   []Continuous Lbs:  [] before manual  [] after manual    []  Ultrasound: []Continuous   [] Pulsed                           []1MHz   []3MHz W/cm2:  Location:    []  Iontophoresis with dexamethasone         Location: [] Take home patch   [] In clinic   10 []  Ice     [x]  heat  []  Ice massage  []  Laser   []  Anodyne Position: supine  Location:L/S, Right hip    []  Laser with stim  []  Other:  Position:  Location:    []  Vasopneumatic Device Pressure:       [] lo [] med [] hi   Temperature: [] lo [] med [] hi   [] Skin assessment post-treatment: []intact []redness- no adverse reaction    []redness - adverse reaction:         27 min Therapeutic Exercise:  [x] See flow sheet :   Rationale: increase ROM and increase strength to improve the patients ability to perform daily task with ease. 12 min Neuromuscular Re-education:  [x]  See flow sheet :   Rationale: increase strength, improve coordination, improve balance and increase proprioception  to improve the patients ability to tolerate ADL's.    8 min Manual Therapy:  Left sidelying: DTM to right glutes, QL, paraspinals   Rationale: decrease pain, increase ROM, increase tissue extensibility and decrease trigger points to improve functional mobility with ADL's. With   [] TE   [] TA   [] neuro   [] other: Patient Education: [x] Review HEP    [] Progressed/Changed HEP based on:   [] positioning   [] body mechanics   [] transfers   [] heat/ice application    [] other:      Other Objective/Functional Measures: Right hip [id number] degrees (some discomfort)     Pain Level (0-10 scale) post treatment: 0    ASSESSMENT/Changes in Function:   Pt continues to display some instability in the right hip noted with several LOB during dynamic gait with pt able to recover with no assistance. Pt reports his right hip continues to give out on him intermittently with ambulation however he has noticed improvements since [facility]. Pt reports increased discomfort in the right groin area and down into the mid quad noting decreased tightness following manual. Some improved hip ROM noted with testing. Continued treatment to improve right hip strength and mobility to aid with ease of ADL's.      Patient will continue to benefit from skilled PT services to modify and progress therapeutic interventions, address functional mobility deficits, address ROM deficits, address strength deficits, analyze and address soft tissue restrictions, analyze and cue movement patterns, analyze and modify body mechanics/ergonomics and assess and modify postural abnormalities to attain remaining goals. [x]  See Plan of Care  []  See progress note/recertification  []  See Discharge Summary         Progress towards goals / Updated goals:  Short Term Goals: To be accomplished in 2 weeks:  1. Patient will report performance of home exercise program 4 of 7 days in the next week demonstrating compliance to therapy program and progress towards independent management of symptoms.             Eval: provided HEP               [id number]: Met 10/7/20 Pt reports understanding and compliance.     Long Term Goals: To be accomplished in 4 weeks:  1. Patient will report right hip pain <2/10 with walking community distance showing improvements in functional mobility and return to prior level of function.              Eval: 6/10               Current: Progressing 10/12/2020 - patient reporting no \"pain\" but weakness in LE with ambulation but improving since initial evaluation.    2. Patient will increase right lower extremity strength to 5/5 for greater ease with transfers, getting in/out of bed, and improved mobility.             Eval: 4+ grossly   3. Patient will improve 5x sit to stand score to <15 seconds to decrease fall risk and improved muscular endurance.               Eval: 20 seconds              Current: MET 10/20/2020 - 13 seconds   4. Patient will increase right hip flexion to 105 degrees for greater ease with donning/doffing shoes, standing from low surface, and getting in/out of vehicle.                Eval: 90 deg   Current: Near met 10/22/20  Right hip [id number] degrees (some discomfort)  5. Patient will improve FOTO score to >/= 61 to demonstrate return to prior level of function and to improve patients ability to perform household activities.               HXCR: 97    PLAN  []  Upgrade activities as tolerated     [x]  Continue plan of care  []  Update interventions per flow sheet       []  Discharge due to:_  []  Other:_      Gt Mccurdy AREN 10/22/2020  8:30 AM    Future Appointments   Date Time Provider Lena Corrales   10/27/2020  7:45 [facility]   [facility]2020  9[facility]30 [facility]   11/20/2020  9:00 AM Matilda Montejo MD [facility]

## 2020-10-26 NOTE — TELEPHONE ENCOUNTER
----- Message from Tara Forman sent at 10/26/2020  8:56 AM CDT -----  Contact: [phone]/Self  Patient requesting to speak with nurse concerning a personal matter. Please advise.     1) I can do the hip injection  2) the hip & back injection should not be done on the same day  3) yes, if a new MRI is pending, would recommend waiting on lumbar block until after

## 2020-10-27 ENCOUNTER — HOSPITAL ENCOUNTER (OUTPATIENT)
Dept: PHYSICAL THERAPY | Age: 75
Discharge: HOME | End: 2020-10-27
Payer: MEDICARE

## 2020-10-27 PROCEDURE — 97110 THERAPEUTIC EXERCISES: CPT

## 2020-10-27 PROCEDURE — 97112 NEUROMUSCULAR REEDUCATION: CPT

## 2020-10-27 PROCEDURE — 97140 MANUAL THERAPY 1/> REGIONS: CPT

## 2020-10-27 NOTE — PROGRESS NOTES
PT DAILY TREATMENT NOTE 10-18    Patient Name: Ten Mejia  Date:10/27/2020  : 1945  [x]  Patient  Verified  Payor: [place] MEDICARE / Plan: [place] MEDICARE PART A & B / Product Type: Medicare /    In time:7:45  Out time:8:33  Total Treatment Time (min): 48  Visit #: 7 of 8    Medicare/BCBS Only   Total Timed Codes (min):  38 1:1 Treatment Time:  38       Treatment Area: Pain in right hip [M25.551]    SUBJECTIVE  Pain Level (0-10 scale): 0/10  Any medication changes, allergies to medications, adverse drug reactions, diagnosis change, or new procedure performed?: [x] No    [] Yes (see summary sheet for update)  Subjective functional status/changes:   [] No changes reported  Pt denies pain currently. Pt states he is deaf and is not wearing his hearing aids today. OBJECTIVE    Modality rationale: decrease inflammation and decrease pain to improve the patients ability to perform ADLs with increased ease.     Min Type Additional Details    [] Estim:  []Unatt       []IFC  []Premod                        []Other:  []w/ice   []w/heat  Position:  Location:    [] Estim: []Att    []TENS instruct  []NMES                    []Other:  []w/US   []w/ice   []w/heat  Position:  Location:    []  Traction: [] Cervical       []Lumbar                       [] Prone          []Supine                       []Intermittent   []Continuous Lbs:  [] before manual  [] after manual    []  Ultrasound: []Continuous   [] Pulsed                           []1MHz   []3MHz W/cm2:  Location:    []  Iontophoresis with dexamethasone         Location: [] Take home patch   [] In clinic   10 []  Ice     [x]  heat  []  Ice massage  []  Laser   []  Anodyne Position:supine  Location:lumbar    []  Laser with stim  []  Other:  Position:  Location:    []  Vasopneumatic Device Pressure:       [] lo [] med [] hi   Temperature: [] lo [] med [] hi   [] Skin assessment post-treatment:  []intact []redness- no adverse reaction    []redness - adverse reaction: 20 min Therapeutic Exercise:  [x] See flow sheet :   Rationale: increase ROM and increase strength to improve the patients ability to perform ADLs with increased ease. 10 min Neuromuscular Re-education:  [x]  See flow sheet :   Rationale: increase strength, improve coordination and increase proprioception  to improve the patients ability to perform ADLs with increased ease. 8 min Manual Therapy:  DTM to right QL, Glute, piriformis, lumbar paraspinals   Rationale: decrease pain, increase ROM and increase tissue extensibility to improve functional mobility with ADLs. With   [] TE   [] TA   [] neuro   [] other: Patient Education: [x] Review HEP    [] Progressed/Changed HEP based on:   [] positioning   [] body mechanics   [] transfers   [] heat/ice application    [] other:      Other Objective/Functional Measures:      Pain Level (0-10 scale) post treatment: 0/10    ASSESSMENT/Changes in Function: Pt demonstrates good form with all therapeutic exercises and has no increased symptoms. Patient will continue to benefit from skilled PT services to modify and progress therapeutic interventions, address functional mobility deficits, address ROM deficits, address strength deficits, analyze and address soft tissue restrictions, analyze and cue movement patterns and analyze and modify body mechanics/ergonomics to attain remaining goals. []  See Plan of Care  []  See progress note/recertification  []  See Discharge Summary         Progress towards goals / Updated goals:  Short Term Goals: To be accomplished in 2 weeks:  1. Patient will report performance of home exercise program 4 of 7 days in the next week demonstrating compliance to therapy program and progress towards independent management of symptoms.             Eval: provided HEP               ADELA: Met 10/7/20 Pt reports understanding and compliance.     Long Term Goals: To be accomplished in 4 weeks:  1.  Patient will report right hip pain <2/10 with walking community distance showing improvements in functional mobility and return to prior level of function.              Eval: 6/10               Current: Progressing 10/12/2020 - patient reporting no \"pain\" but weakness in LE with ambulation but improving since initial evaluation.    2. Patient will increase right lower extremity strength to 5/5 for greater ease with transfers, getting in/out of bed, and improved mobility.             Eval: 4+ grossly   3. Patient will improve 5x sit to stand score to <15 seconds to decrease fall risk and improved muscular endurance.               Eval: 20 seconds              Current: MET 10/20/2020 - 13 seconds   4. Patient will increase right hip flexion to 105 degrees for greater ease with donning/doffing shoes, standing from low surface, and getting in/out of vehicle.                Eval: 90 deg              Current: Near met 10/22/20  Right hip [id number] degrees (some discomfort)  5. Patient will improve FOTO score to >/= 61 to demonstrate return to prior level of function and to improve patients ability to perform household activities.               JNHP: 76    PLAN  []  Upgrade activities as tolerated     [x]  Continue plan of care  []  Update interventions per flow sheet       []  Discharge due to:_  []  Other:_      Alix Bowels, PTA 10/27/2020  7:55 AM    Future Appointments   Date Time Provider Lena Corrales   10/29/2020  9:30 AM Shelly Ag [facility]   11/20/2020  9:00 AM Yesi Long MD [facility]

## 2020-10-29 ENCOUNTER — HOSPITAL ENCOUNTER (OUTPATIENT)
Dept: PHYSICAL THERAPY | Age: 75
Discharge: HOME | End: 2020-10-29
Payer: MEDICARE

## 2020-10-29 PROCEDURE — 97112 NEUROMUSCULAR REEDUCATION: CPT

## 2020-10-29 PROCEDURE — 97110 THERAPEUTIC EXERCISES: CPT

## 2020-10-29 PROCEDURE — 97140 MANUAL THERAPY 1/> REGIONS: CPT

## 2020-10-29 NOTE — PROGRESS NOTES
PT DAILY TREATMENT NOTE 10-18    Patient Name: Estevan Hammans  Date:10/29/2020  : 1945  [x]  Patient  Verified  Payor: [place] MEDICARE / Plan: [place] MEDICARE PART A & B / Product Type: Medicare /    In time: 80  Out time:1029  Total Treatment Time (min): 61  Visit #: 8 of 8    Medicare/BCBS Only   Total Timed Codes (min):  49 1:1 Treatment Time:  49       Treatment Area: Pain in right hip [M25.551]    SUBJECTIVE  Pain Level (0-10 scale): 0  Any medication changes, allergies to medications, adverse drug reactions, diagnosis change, or new procedure performed?: [x] No    [] Yes (see summary sheet for update)  Subjective functional status/changes:   [] No changes reported  Patient denies pain in the right hip but continues to report discomfort in right groin.      OBJECTIVE    Modality rationale: decrease pain and increase tissue extensibility to improve the patients ability to ambulate and perform functional activity with improved ease    Min Type Additional Details    [] Estim:  []Unatt       []IFC  []Premod                        []Other:  []w/ice   []w/heat  Position:  Location:    [] Estim: []Att    []TENS instruct  []NMES                    []Other:  []w/US   []w/ice   []w/heat  Position:  Location:    []  Traction: [] Cervical       []Lumbar                       [] Prone          []Supine                       []Intermittent   []Continuous Lbs:  [] before manual  [] after manual    []  Ultrasound: []Continuous   [] Pulsed                           []1MHz   []3MHz W/cm2:  Location:    []  Iontophoresis with dexamethasone         Location: [] Take home patch   [] In clinic   10 []  Ice     [x]  heat  []  Ice massage  []  Laser   []  Anodyne Position: sitting  Location: low back/hips    []  Laser with stim  []  Other:  Position:  Location:    []  Vasopneumatic Device Pressure:       [] lo [] med [] hi   Temperature: [] lo [] med [] hi   [x] Skin assessment post-treatment:  [x]intact []redness- no adverse reaction    []redness - adverse reaction:     22 min Therapeutic Exercise:  [x] See flow sheet :   Rationale: increase ROM, increase strength and improve coordination to improve the patients ability to perform ADLs and stair negotiation with greater ease      19 min Neuromuscular Re-education:  [x]  See flow sheet: core stabilization, balance exercises, marches on airex, sit<>stands on airex    Rationale: increase strength, improve balance and increase proprioception  to improve the patients ability to perform transfers and ambulate with improved balance and stability     8 min Manual Therapy:  Left sidelying- DTM to right glutes and piriformis    Rationale: decrease pain, increase tissue extensibility and decrease trigger points to ambulate and perform functional mobility with greater ease           With   [] TE   [] TA   [] neuro   [] other: Patient Education: [x] Review HEP    [] Progressed/Changed HEP based on:   [] positioning   [] body mechanics   [] transfers   [] heat/ice application    [] other:      Other Objective/Functional Measures: right hip flexion 5-/5, right knee extension 5/5, right knee flexion 5-/5, right hip abduction 5-/5, right hip extension 4+/5     Pain Level (0-10 scale) post treatment: 0    ASSESSMENT/Changes in Function: Patient presents for last visit on current certification period. Patient has made good progress with right LE strength, ambulation with decreased pain, and improvements in balance overall. He also reports performing step-over-step stair negotiation. Patient continues to be limited with long distance ambulation, ADL performance, transfers, and consistent stair negotiation without limitations. Patient would benefit from 1-2x/week for 3 more weeks to further progress towards functional goals.      Patient will continue to benefit from skilled PT services to modify and progress therapeutic interventions, address functional mobility deficits, address ROM deficits, address strength deficits, analyze and address soft tissue restrictions, analyze and cue movement patterns, analyze and modify body mechanics/ergonomics, assess and modify postural abnormalities, address imbalance/dizziness and instruct in home and community integration to attain remaining goals. []  See Plan of Care  [x]  See progress note/recertification  []  See Discharge Summary         Progress towards goals / Updated goals:  Short Term Goals: To be accomplished in 2 weeks:  1. Patient will report performance of home exercise program 4 of 7 days in the next week demonstrating compliance to therapy program and progress towards independent management of symptoms.             Eval: provided HEP               PMFVGEB: Met 10/7/20 Pt reports understanding and compliance.     Long Term Goals: To be accomplished in 4 weeks:  1. Patient will report right hip pain <2/10 with walking community distance showing improvements in functional mobility and return to prior level of function.              Eval: 6/10               Current: Progressing 10/12/2020 - patient reporting no \"pain\" but weakness in LE with ambulation but improving since initial evaluation.    2. Patient will increase right lower extremity strength to 5/5 for greater ease with transfers, getting in/out of bed, and improved mobility.             Eval: 4+ grossly    Current: Progressing 10/29/2020 - 5-/5 grossly   3. Patient will improve 5x sit to stand score to <15 seconds to decrease fall risk and improved muscular endurance.               Eval: 20 seconds              Current: MET 10/20/2020 - 13 seconds   4. Patient will increase right hip flexion to 105 degrees for greater ease with donning/doffing shoes, standing from low surface, and getting in/out of vehicle.                Eval: 90 deg              Current: Near met 10/22/20 - Right hip flexion 104 degrees (some discomfort)  5.  Patient will improve FOTO score to >/= 61 to demonstrate return to prior level of function and to improve patients ability to perform household activities.               PDYX: 80   Current: Progressing 10/29/2020 - 62    PLAN  [x]  Upgrade activities as tolerated     []  Continue plan of care  []  Update interventions per flow sheet       []  Discharge due to:_  []  Other:_      Becka Bucio, PT, DPT 10/29/2020  9:36 AM    Future Appointments   Date Time Provider Lena Allredi   11/20/2020  9:00 AM Curt Can MD [facility]

## 2020-10-29 NOTE — PROGRESS NOTES
[facility]  [address]  [place], [address]  [phone] fax    Continued Plan of Care/ Re-certification for Physical Therapy Services    Patient name: Marisel Silva Start of Care: 10/5/2020   Referral source: Keri Julien MD : 1945   Medical/Treatment Diagnosis: Pain in right hip [M25.551]  Payor: Mariam Reyes / Plan: [place] MEDICARE PART A & B / Product Type: Medicare /  Onset Date: 2020     Prior Hospitalization: see medical history Provider#: [id number]   Medications: Verified on Patient Summary List    Comorbidities: arthritis, back pain, BMI >30, hearing impairment, high blood pressure, osteoporosis, prior surgery, prosthesis/implants, sleep dysfunction   Prior Level of Function: independent with ambulation and ADLs, working out (biking/swimming)   Visits from Start of Care: 8    Missed Visits: 0    The Plan of Care and following information is based on the patient's current status:  Short Term Goals: To be accomplished in 2 weeks:  1. Patient will report performance of home exercise program 4 of 7 days in the next week demonstrating compliance to therapy program and progress towards independent management of symptoms.             Eval: provided HEP               [facility]: Met - Pt reports understanding and compliance.     Long Term Goals: To be accomplished in 4 weeks:  1. Patient will report right hip pain <2/10 with walking community distance showing improvements in functional mobility and return to prior level of function.              Eval: 6/10               Current: Progressing  - patient reporting no \"pain\" but weakness in LE with ambulation but improving since initial evaluation.    2. Patient will increase right lower extremity strength to 5/5 for greater ease with transfers, getting in/out of bed, and improved mobility.             Eval: 4+ grossly    Current: Progressing - 5-/5 grossly   3.  Patient will improve 5x sit to stand score to <15 seconds to decrease fall risk and improved muscular endurance.               Eval: 20 seconds              Current: MET - 13 seconds   4. Patient will increase right hip flexion to 105 degrees for greater ease with donning/doffing shoes, standing from low surface, and getting in/out of vehicle.                Eval: 90 deg              Current: Near met - Right hip flexion 104 degrees (some discomfort)  5. Patient will improve FOTO score to >/= 61 to demonstrate return to prior level of function and to improve patients ability to perform household activities.             Eval: 47   Current: Progressing - 62      Key functional changes: improved strength, improved hip flexion, improved sit to stand transfers, and functional mobility as reported per FOTO      Problems/ barriers to goal attainment: low back and thoracic pain limiting certain exercises/positions     Problem List: pain affecting function, decrease ROM, decrease strength, impaired gait/ balance, decrease ADL/ functional abilitiies, decrease activity tolerance, decrease flexibility/ joint mobility and decrease transfer abilities    Treatment Plan: Therapeutic exercise, Therapeutic activities, Neuromuscular re-education, Physical agent/modality, Gait/balance training, Manual therapy, Patient education, Self Care training, Functional mobility training, Home safety training and Stair training     Patient Goal (s) has been updated and includes: \"work on balance\"     Goals for this certification period to be accomplished in 3 weeks:  1.  Patient will report right hip pain <2/10 with walking community distance showing improvements in functional mobility and return to prior level of function.              PN: patient reporting no \"pain\" but weakness in LE with ambulation but improving since initial evaluation.    2. Patient will increase right lower extremity strength to 5/5 for greater ease with transfers, getting in/out of bed, and improved mobility.             PN: 5-/5 grossly   5. Patient will improve FOTO score to >/= 61 to demonstrate return to prior level of function and to improve patients ability to perform household activities.             PN: 57    Frequency / Duration: Patient to be seen 1-2 times per week for 3  weeks:    Assessment / Recommendations:Patient presents for last visit on current certification period. Patient has made good progress with right LE strength, ambulation with decreased pain, and improvements in balance overall. He also reports performing step-over-step stair negotiation. Patient continues to be limited with long distance ambulation, ADL performance, transfers, and consistent stair negotiation without limitations. Patient would benefit from 1-2x/week for 3 more weeks to further progress towards functional goals. Certification Period: 10/30/2020 - 11/28/2020     August Arango PT, DPT 10/29/2020 10:25 AM    ________________________________________________________________________  I certify that the above Therapy Services are being furnished while the patient is under my care. I agree with the treatment plan and certify that this therapy is necessary. [] I have read the above and request that my patient continue as recommended.   [] I have read the above report and request that my patient continue therapy with the following changes/special instructions: _____________________________________________  [] I have read the above report and request that my patient be discharged from therapy    Physician's Signature:____________Date:_________TIME:________    ** Signature, Date and Time must be completed for valid certification **    Please sign and return to [facility] [address] [place] [address]  [address]  [place], [address]  [phone] fax

## 2020-11-06 ENCOUNTER — HOSPITAL ENCOUNTER (OUTPATIENT)
Dept: PHYSICAL THERAPY | Age: 75
Discharge: HOME | End: 2020-11-06
Payer: MEDICARE

## 2020-11-06 PROCEDURE — 97140 MANUAL THERAPY 1/> REGIONS: CPT

## 2020-11-06 PROCEDURE — 97112 NEUROMUSCULAR REEDUCATION: CPT

## 2020-11-06 PROCEDURE — 97110 THERAPEUTIC EXERCISES: CPT

## 2020-11-06 NOTE — PROGRESS NOTES
PT DAILY TREATMENT NOTE     Patient Name: Louis Xavier  Date:2020  : 1945  [x]  Patient  Verified  Payor: [place] MEDICARE / Plan: VA MEDICARE PART A & B / Product Type: Medicare /    In time:9:13  Out time:10:08  Total Treatment Time (min): 54  Visit #: 1 of 3-6    Medicare/BCBS Only   Total Timed Codes (min):  45 1:1 Treatment Time:  45       Treatment Area: Pain in right hip [M25.551]    SUBJECTIVE  Pain Level (0-10 scale): 0  Any medication changes, allergies to medications, adverse drug reactions, diagnosis change, or new procedure performed?: [x] No    [] Yes (see summary sheet for update)  Subjective functional status/changes:   [] No changes reported  Pt reports having a hard time lifting his leg when he is in bed    OBJECTIVE    Modality rationale: decrease pain to improve the patients ability to perform daily tasks   Min Type Additional Details    [] Estim:  []Unatt       []IFC  []Premod                        []Other:  []w/ice   []w/heat  Position:  Location:    [] Estim: []Att    []TENS instruct  []NMES                    []Other:  []w/US   []w/ice   []w/heat  Position:  Location:    []  Traction: [] Cervical       []Lumbar                       [] Prone          []Supine                       []Intermittent   []Continuous Lbs:  [] before manual  [] after manual    []  Ultrasound: []Continuous   [] Pulsed                           []1MHz   []3MHz W/cm2:  Location:    []  Iontophoresis with dexamethasone         Location: [] Take home patch   [] In clinic   10 []  Ice     [x]  heat  []  Ice massage  []  Laser   []  Anodyne Position: supuine  Location: low back    []  Laser with stim  []  Other:  Position:  Location:    []  Vasopneumatic Device Pressure:       [] lo [] med [] hi   Temperature: [] lo [] med [] hi   [] Skin assessment post-treatment:  []intact []redness- no adverse reaction    []redness - adverse reaction:     27 min Therapeutic Exercise:  [x] See flow sheet : Rationale: increase ROM and increase strength to improve the patients ability to perform ADLs    10 min Neuromuscular Re-education:  [x]  See flow sheet :   Rationale: increase strength, improve balance and increase proprioception  to improve the patients ability to perform functional tasks    8 min Manual Therapy:  STM/DTM to right piri and glutes   The manual therapy interventions were performed at a separate and distinct time from the therapeutic activities interventions. Rationale: decrease pain, increase ROM and increase tissue extensibility to improve functional mobility        With   [] TE   [] TA   [] neuro   [] other: Patient Education: [x] Review HEP    [] Progressed/Changed HEP based on:   [] positioning   [] body mechanics   [] transfers   [] heat/ice application    [] other:      Other Objective/Functional Measures:   incr'd reps per flow sheet     Pain Level (0-10 scale) post treatment: 0    ASSESSMENT/Changes in Function: Pt was challenged with SLS on foam, req's several UE support. Vc's to avoid excessive right hip ER with side stepping. Challenged with hurdles, performed near window sill so pt could use intermittently to maintain balance. Pt fatigued at end of treatment but denies incr'd pain. Patient will continue to benefit from skilled PT services to modify and progress therapeutic interventions, address functional mobility deficits, address ROM deficits, address strength deficits, analyze and address soft tissue restrictions, analyze and cue movement patterns, analyze and modify body mechanics/ergonomics, assess and modify postural abnormalities and address imbalance/dizziness to attain remaining goals. []  See Plan of Care  []  See progress note/recertification  []  See Discharge Summary         Progress towards goals / Updated goals:  Goals for this certification period to be accomplished in 3 weeks:  1.  Patient will report right hip pain <2/10 with walking community distance showing improvements in functional mobility and return to prior level of function.              PN: patient reporting no \"pain\" but weakness in LE with ambulation but improving since initial evaluation.    2. Patient will increase right lower extremity strength to 5/5 for greater ease with transfers, getting in/out of bed, and improved mobility.             PN: 5-/5 grossly   5. Patient will improve FOTO score to >/= 61 to demonstrate return to prior level of function and to improve patients ability to perform household activities.               TB: 52    PLAN  []  Upgrade activities as tolerated     [x]  Continue plan of care  []  Update interventions per flow sheet       []  Discharge due to:_  []  Other:_      [facility], PTA 11/6/2020  9:17 AM    Future Appointments   Date Time Provider Lena Corrales   11/9/2020 10:00 AM Adam Boykin [facility]   11/12/2020 10:00 [facility]   11/17/2020 10:30 [facility]   11/19/2020 10:00 AM Nakul Feldman, PTA [facility]   11/20/2020  9:00 AM [facility]   11/24/2020  9:00 AM [facility]

## 2020-11-09 ENCOUNTER — HOSPITAL ENCOUNTER (OUTPATIENT)
Dept: PHYSICAL THERAPY | Age: 75
Discharge: HOME | End: 2020-11-09
Payer: MEDICARE

## 2020-11-09 PROCEDURE — 97110 THERAPEUTIC EXERCISES: CPT

## 2020-11-09 PROCEDURE — 97140 MANUAL THERAPY 1/> REGIONS: CPT

## 2020-11-09 PROCEDURE — 97112 NEUROMUSCULAR REEDUCATION: CPT

## 2020-11-09 NOTE — PROGRESS NOTES
PT DAILY TREATMENT NOTE     Patient Name: Tayla Tellez  Date:2020  : 1945  [x]  Patient  Verified  Payor: VA MEDICARE / Plan: VA MEDICARE PART A & B / Product Type: Medicare /    In time:1001  Out time: 1056  Total Treatment Time (min): 55  Visit #: 2 of 3-6    Medicare/BCBS Only   Total Timed Codes (min):  45 1:1 Treatment Time:  55       Treatment Area: Pain in right hip [M25.551]    SUBJECTIVE  Pain Level (0-10 scale): 2 \"stiff\" in back  Any medication changes, allergies to medications, adverse drug reactions, diagnosis change, or new procedure performed?: [x] No    [] Yes (see summary sheet for update)  Subjective functional status/changes:   [] No changes reported  Patient reports feeling stiff today. He reports going on a long car drive this weekend that made everything stiffen up.      OBJECTIVE    Modality rationale: decrease pain and increase tissue extensibility to improve the patients ability to perform ADLs and overall functional mobility with greater ease    Min Type Additional Details    [] Estim:  []Unatt       []IFC  []Premod                        []Other:  []w/ice   []w/heat  Position:  Location:    [] Estim: []Att    []TENS instruct  []NMES                    []Other:  []w/US   []w/ice   []w/heat  Position:  Location:    []  Traction: [] Cervical       []Lumbar                       [] Prone          []Supine                       []Intermittent   []Continuous Lbs:  [] before manual  [] after manual    []  Ultrasound: []Continuous   [] Pulsed                           []1MHz   []3MHz W/cm2:  Location:    []  Iontophoresis with dexamethasone         Location: [] Take home patch   [] In clinic   10 []  Ice     [x]  heat  []  Ice massage  []  Laser   []  Anodyne Position: supine   Location: low back    []  Laser with stim  []  Other:  Position:  Location:    []  Vasopneumatic Device Pressure:       [] lo [] med [] hi   Temperature: [] lo [] med [] hi   [x] Skin assessment post-treatment:  [x]intact []redness- no adverse reaction  m  []redness - adverse reaction:     23 min Therapeutic Exercise:  [x] See flow sheet :   Rationale: increase ROM, increase strength and improve coordination to improve the patients ability to perform ADLs with greater ease      14 min Neuromuscular Re-education:  [x]  See flow sheet: balance exercises, marches on airex, sit<>stand on airex, glute stability per flow sheet    Rationale: increase strength, improve balance and increase proprioception  to improve the patients ability to perform ADLs with decreased fall risk and overall improved stability     8 min Manual Therapy:  Left sidelying - DTM to right glutes and piriformis    The manual therapy interventions were performed at a separate and distinct time from the therapeutic activities interventions. Rationale: decrease pain and increase tissue extensibility to perform ambulation and overall functional mobility with greater ease         With   [] TE   [] TA   [] neuro   [] other: Patient Education: [x] Review HEP    [] Progressed/Changed HEP based on:   [] positioning   [] body mechanics   [] transfers   [] heat/ice application    [] other:      Pain Level (0-10 scale) post treatment: 0    ASSESSMENT/Changes in Function: Patient appearing more short of breath today with activities, however he reports he is fatigued due to long car drive yesterday. SpO2 was 95% and HR was 82 bpm following standing activity. Encouraged patient to focus more on breathing throughout therex today. Patient continues to be challenged with stepping over abdirahman however, did not require UE support. Continue progressing LE strengthen, ambulation, balance, and overall functional mobility as tolerated.      Patient will continue to benefit from skilled PT services to modify and progress therapeutic interventions, address functional mobility deficits, address ROM deficits, address strength deficits, analyze and address soft tissue restrictions, analyze and cue movement patterns, analyze and modify body mechanics/ergonomics, assess and modify postural abnormalities, address imbalance/dizziness and instruct in home and community integration to attain remaining goals. [x]  See Plan of Care  []  See progress note/recertification  []  See Discharge Summary         Progress towards goals / Updated goals:  Goals for this certification period to be accomplished in 3 weeks:  1. Patient will report right hip pain <2/10 with walking community distance showing improvements in functional mobility and return to prior level of function.              PN: patient reporting no \"pain\" but weakness in LE with ambulation but improving since initial evaluation.     Current: MET 11/9/2020 - patient reports no difficulty with stair negotiation   2. Patient will increase right lower extremity strength to 5/5 for greater ease with transfers, getting in/out of bed, and improved mobility.             PN: 5-/5 grossly   5. Patient will improve FOTO score to >/= 61 to demonstrate return to prior level of function and to improve patients ability to perform household activities.               PN: 57    PLAN  [x]  Upgrade activities as tolerated     []  Continue plan of care  []  Update interventions per flow sheet       []  Discharge due to:_  []  Other:_      Becka Bucio, PT, DPT 11/9/2020  10:05 AM    Future Appointments   Date Time Provider Lena Corrales   11/12/2020 10:00 AM [facility]   11/17/2020 10:30 AM [facility]   11/19/2020 10:00 AM Nakul Feldman PTA [facility]   11/20/2020  9:00 AM Ariel Spann MD [facility] AM[facility]   11/24/2020  9:00 AM [facility]

## 2020-11-12 ENCOUNTER — HOSPITAL ENCOUNTER (OUTPATIENT)
Dept: PHYSICAL THERAPY | Age: 75
Discharge: HOME | End: 2020-11-12
Payer: MEDICARE

## 2020-11-12 PROCEDURE — 97110 THERAPEUTIC EXERCISES: CPT

## 2020-11-12 PROCEDURE — 97112 NEUROMUSCULAR REEDUCATION: CPT

## 2020-11-12 PROCEDURE — 97140 MANUAL THERAPY 1/> REGIONS: CPT

## 2020-11-12 NOTE — PROGRESS NOTES
PT DAILY TREATMENT NOTE     Patient Name: Alexandra Sabillon  Date:2020  : 1945  [x]  Patient  Verified  Payor: [place] MEDICARE / Plan: [place] MEDICARE PART A & B / Product Type: Medicare /    In time:10:00  Out time:11  Total Treatment Time (min): 60  Visit #: 3 of 3-6    Medicare/BCBS Only   Total Timed Codes (min):  50 1:1 Treatment Time:  40       Treatment Area: Pain in right hip [M25.551]    SUBJECTIVE  Pain Level (0-10 scale): 0  Any medication changes, allergies to medications, adverse drug reactions, diagnosis change, or new procedure performed?: [x] No    [] Yes (see summary sheet for update)  Subjective functional status/changes:   [] No changes reported  Pt reports no pain coming in today. OBJECTIVE    Modality rationale: decrease pain and increase tissue extensibility to improve the patients ability to perform functional task with ease.    Min Type Additional Details    [] Estim:  []Unatt       []IFC  []Premod                        []Other:  []w/ice   []w/heat  Position:  Location:    [] Estim: []Att    []TENS instruct  []NMES                    []Other:  []w/US   []w/ice   []w/heat  Position:  Location:    []  Traction: [] Cervical       []Lumbar                       [] Prone          []Supine                       []Intermittent   []Continuous Lbs:  [] before manual  [] after manual    []  Ultrasound: []Continuous   [] Pulsed                           []1MHz   []3MHz W/cm2:  Location:    []  Iontophoresis with dexamethasone         Location: [] Take home patch   [] In clinic   10 []  Ice     [x]  heat  []  Ice massage  []  Laser   []  Anodyne Position: supine  Location: L/S    []  Laser with stim  []  Other:  Position:  Location:    []  Vasopneumatic Device Pressure:       [] lo [] med [] hi   Temperature: [] lo [] med [] hi   [] Skin assessment post-treatment:  []intact []redness- no adverse reaction    []redness - adverse reaction:       27 min Therapeutic Exercise:  [x] See flow sheet :   Rationale: increase ROM and increase strength to improve the patients ability to perform functional task with ease. 15 min Neuromuscular Re-education:  [x]  See flow sheet :   Rationale: increase strength, improve coordination, improve balance and increase proprioception  to improve the patients ability to perform ADL's with ease. 8 min Manual Therapy:  Left sidelying - DTM to right glutes and piriformis    The manual therapy interventions were performed at a separate and distinct time from the therapeutic activities interventions. Rationale: decrease pain, increase ROM and increase tissue extensibility to improve overall functional mobility. With   [] TE   [] TA   [] neuro   [] other: Patient Education: [x] Review HEP    [] Progressed/Changed HEP based on:   [] positioning   [] body mechanics   [] transfers   [] heat/ice application    [] other:      Other Objective/Functional Measures:   Peach band added to hip x3- 15x     Pain Level (0-10 scale) post treatment: 0    ASSESSMENT/Changes in Function:   Peach band added to hip x 3 and bridges to continue to strengthen the right hip with pt noting some fatigue with progressions but no pain. Pt challenged with balance activities on the airex displaying several LOB and requiring UE support. Improvements noted with dynamic gait activities with pt displaying no LOB with abdirahman step overs and improved ease with tandem walking. Patient will continue to benefit from skilled PT services to modify and progress therapeutic interventions, address functional mobility deficits, address ROM deficits, address strength deficits, analyze and address soft tissue restrictions, analyze and cue movement patterns, analyze and modify body mechanics/ergonomics and assess and modify postural abnormalities to attain remaining goals.      [x]  See Plan of Care  []  See progress note/recertification  []  See Discharge Summary         Progress towards goals / Updated goals:  Goals for this certification period to be accomplished in 3 weeks:  1. Patient will report right hip pain <2/10 with walking community distance showing improvements in functional mobility and return to prior level of function.              PN: patient reporting no \"pain\" but weakness in LE with ambulation but improving since initial evaluation.                Current: MET 11/9/2020 - patient reports no difficulty with stair negotiation   2. Patient will increase right lower extremity strength to 5/5 for greater ease with transfers, getting in/out of bed, and improved mobility.             PN: 5-/5 grossly   5. Patient will improve FOTO score to >/= 61 to demonstrate return to prior level of function and to improve patients ability to perform household activities.               PN: 57    PLAN  []  Upgrade activities as tolerated     [x]  Continue plan of care  []  Update interventions per flow sheet       []  Discharge due to:_  []  Other:_      Juancho Pruett PTA 11/12/2020  9:52 AM    Future Appointments   Date Time Provider Lena Corrales   11/12/2020 10:00 AM Balaji Sherman PTA [facility]   11/17/2020 10:30 AM Ciera Seaman [facility]   11/19/2020 10:00 AM Balaji Sherman PTA [facility]   11/20/2020  9:00 AM Maria L Garrett MD [facility]   11/24/2020  9:00 AM Frankie Bowser [facility]

## 2020-11-17 ENCOUNTER — HOSPITAL ENCOUNTER (OUTPATIENT)
Dept: PHYSICAL THERAPY | Age: 75
Discharge: HOME | End: 2020-11-17
Payer: MEDICARE

## 2020-11-17 PROCEDURE — 97140 MANUAL THERAPY 1/> REGIONS: CPT

## 2020-11-17 PROCEDURE — 97110 THERAPEUTIC EXERCISES: CPT

## 2020-11-17 PROCEDURE — 97112 NEUROMUSCULAR REEDUCATION: CPT

## 2020-11-17 NOTE — PROGRESS NOTES
PT DAILY TREATMENT NOTE     Patient Name: Kei Mcarthur  Date:2020  : 1945  [x]  Patient  Verified  Payor: [place] MEDICARE / Plan: [place] MEDICARE PART A & B / Product Type: Medicare /    In time:1030  Out time:1127  Total Treatment Time (min): 62  Visit #: 4 of 6    Medicare/BCBS Only   Total Timed Codes (min):  47 1:1 Treatment Time:  52       Treatment Area: Pain in right hip [M25.551]    SUBJECTIVE  Pain Level (0-10 scale): 0  Any medication changes, allergies to medications, adverse drug reactions, diagnosis change, or new procedure performed?: [x] No    [] Yes (see summary sheet for update)  Subjective functional status/changes:   [] No changes reported  Patient denies pain in right hip today.      OBJECTIVE    Modality rationale: decrease pain and increase tissue extensibility to improve the patients ability to perform overall functional mobility with greater ease    Min Type Additional Details    [] Estim:  []Unatt       []IFC  []Premod                        []Other:  []w/ice   []w/heat  Position:  Location:    [] Estim: []Att    []TENS instruct  []NMES                    []Other:  []w/US   []w/ice   []w/heat  Position:  Location:    []  Traction: [] Cervical       []Lumbar                       [] Prone          []Supine                       []Intermittent   []Continuous Lbs:  [] before manual  [] after manual    []  Ultrasound: []Continuous   [] Pulsed                           []1MHz   []3MHz W/cm2:  Location:    []  Iontophoresis with dexamethasone         Location: [] Take home patch   [] In clinic   10 []  Ice     [x]  heat  []  Ice massage  []  Laser   []  Anodyne Position:  Location:    []  Laser with stim  []  Other:  Position:  Location:    []  Vasopneumatic Device Pressure:       [] lo [] med [] hi   Temperature: [] lo [] med [] hi   [] Skin assessment post-treatment:  []intact []redness- no adverse reaction    []redness - adverse reaction:     22 min Therapeutic Exercise:  [x] See flow sheet :   Rationale: increase ROM, increase strength and improve coordination to improve the patients ability to perform ADLs and self care tasks with greater ease     17 min Neuromuscular Re-education:  [x]  See flow sheet: balance exercises, marches on airex, bridges for  glute stability, sit<>stand on airex     Rationale: increase strength, improve coordination, improve balance and increase proprioception  to improve the patients ability to perform functional mobility with improved stability and greater ease     8 min Manual Therapy:  Left side lying - STM/DTM to right glutes/TFL    The manual therapy interventions were performed at a separate and distinct time from the therapeutic activities interventions. Rationale: decrease pain and increase tissue extensibility to perform ADLs with improved tolerance           With   [] TE   [] TA   [] neuro   [] other: Patient Education: [x] Review HEP    [] Progressed/Changed HEP based on:   [] positioning   [] body mechanics   [] transfers   [] heat/ice application    [] other:         Pain Level (0-10 scale) post treatment: 0    ASSESSMENT/Changes in Function: Patient presents with back pain but no right hip pain. Exercise progression limited due to back pain today, however he was able to tolerate progression of bridges on swiss ball. Patient continues to be challenged with balance exercises and dynamic gait activities. Continue progressing as tolerated. Patient will continue to benefit from skilled PT services to modify and progress therapeutic interventions, address functional mobility deficits, address ROM deficits, address strength deficits, analyze and address soft tissue restrictions, analyze and cue movement patterns, analyze and modify body mechanics/ergonomics, assess and modify postural abnormalities, address imbalance/dizziness and instruct in home and community integration to attain remaining goals.      [x]  See Plan of Care  []  See progress note/recertification  []  See Discharge Summary         Progress towards goals / Updated goals:  Goals for this certification period to be accomplished in 3 weeks:  1. Patient will report right hip pain <2/10 with walking community distance showing improvements in functional mobility and return to prior level of function.              PN: patient reporting no \"pain\" but weakness in LE with ambulation but improving since initial evaluation.                Current: MET 11/9/2020 - patient reports no difficulty with stair negotiation   2. Patient will increase right lower extremity strength to 5/5 for greater ease with transfers, getting in/out of bed, and improved mobility.             PN: 5-/5 grossly   5. Patient will improve FOTO score to >/= 61 to demonstrate return to prior level of function and to improve patients ability to perform household activities.               PN: 57    PLAN  [x]  Upgrade activities as tolerated     []  Continue plan of care  []  Update interventions per flow sheet       []  Discharge due to:_  []  Other:_      Alina Luu PT, DPT 11/17/2020  10:38 AM    Future Appointments   Date Time Provider Lena Corrales   11/19/2020 10:00 [facility]   11/20/2020  9:00 AM Aleksandr Silva MD [facility]   11/24/2020  9:00 AM Radha Jarvis [facility]

## 2020-11-19 ENCOUNTER — HOSPITAL ENCOUNTER (OUTPATIENT)
Dept: PHYSICAL THERAPY | Age: 75
Discharge: HOME | End: 2020-11-19
Payer: MEDICARE

## 2020-11-19 PROCEDURE — 97112 NEUROMUSCULAR REEDUCATION: CPT

## 2020-11-19 PROCEDURE — 97110 THERAPEUTIC EXERCISES: CPT

## 2020-11-19 NOTE — PROGRESS NOTES
PT DAILY TREATMENT NOTE     Patient Name: Leia Mealing  Date:2020  : 1945  [x]  Patient  Verified  Payor: VA MEDICARE / Plan: VA MEDICARE PART A & B / Product Type: Medicare /    In time:10:00  Out time:10:55  Total Treatment Time (min): 55  Visit #: 5 of 6    Medicare/BCBS Only   Total Timed Codes (min):  45 1:1 Treatment Time:  45       Treatment Area: Pain in right hip [M25.551]    SUBJECTIVE  Pain Level (0-10 scale): 0  Any medication changes, allergies to medications, adverse drug reactions, diagnosis change, or new procedure performed?: [x] No    [] Yes (see summary sheet for update)  Subjective functional status/changes:   [] No changes reported  Pt reports no pain coming in today. OBJECTIVE    Modality rationale: decrease pain and increase tissue extensibility to improve the patients ability to perform functional task with ease.    Min Type Additional Details    [] Estim:  []Unatt       []IFC  []Premod                        []Other:  []w/ice   []w/heat  Position:  Location:    [] Estim: []Att    []TENS instruct  []NMES                    []Other:  []w/US   []w/ice   []w/heat  Position:  Location:    []  Traction: [] Cervical       []Lumbar                       [] Prone          []Supine                       []Intermittent   []Continuous Lbs:  [] before manual  [] after manual    []  Ultrasound: []Continuous   [] Pulsed                           []1MHz   []3MHz W/cm2:  Location:    []  Iontophoresis with dexamethasone         Location: [] Take home patch   [] In clinic   10 []  Ice     [x]  heat  []  Ice massage  []  Laser   []  Anodyne Position: supine with wedge  Location: L/S, right hip    []  Laser with stim  []  Other:  Position:  Location:    []  Vasopneumatic Device Pressure:       [] lo [] med [] hi   Temperature: [] lo [] med [] hi   [] Skin assessment post-treatment:  []intact []redness- no adverse reaction    []redness - adverse reaction:         27 min Therapeutic Exercise:  [x] See flow sheet :   Rationale: increase ROM and increase strength to improve the patients ability to perform functional task with ease. 18 min Neuromuscular Re-education:  [x]  See flow sheet :   Rationale: increase strength, improve coordination, improve balance and increase proprioception  to improve the patients ability to *              With   [] TE   [] TA   [] neuro   [] other: Patient Education: [x] Review HEP    [] Progressed/Changed HEP based on:   [] positioning   [] body mechanics   [] transfers   [] heat/ice application    [] other:      Other Objective/Functional Measures: Pt to prepare for d/c NV. Pain Level (0-10 scale) post treatment: 0    ASSESSMENT/Changes in Function:   Pt reports being nearly void of pain in his right hip noting most of his pain comes from his right groin area and his low back. Pt ddisplays improved strength and mobility in the right hip and notes no pain with MMT. Pt continues to be challenged with static and dynamic balance exercises but has displayed significant improvements since [facility]. Manual held due to pt reporting no pain. Patient will continue to benefit from skilled PT services to modify and progress therapeutic interventions, address functional mobility deficits, address ROM deficits, address strength deficits, analyze and address soft tissue restrictions, analyze and cue movement patterns, analyze and modify body mechanics/ergonomics and assess and modify postural abnormalities to attain remaining goals. [x]  See Plan of Care  []  See progress note/recertification  []  See Discharge Summary         Progress towards goals / Updated goals:  1.  Patient will report right hip pain <2/10 with walking community distance showing improvements in functional mobility and return to prior level of function.              PN: patient reporting no \"pain\" but weakness in LE with ambulation but improving since initial evaluation.                Current: MET 11/9/2020 - patient reports no difficulty with stair negotiation   2. Patient will increase right lower extremity strength to 5/5 for greater ease with transfers, getting in/out of bed, and improved mobility.             PN: 5-/5 grossly    Current: Met 11/19/20  right lower extremity strength 5/5 no pain with testing. 5. Patient will improve FOTO score to >/= 61 to demonstrate return to prior level of function and to improve patients ability to perform household activities.               PN: 57    PLAN  []  Upgrade activities as tolerated     [x]  Continue plan of care  []  Update interventions per flow sheet       []  Discharge due to:_  []  Other:_      Leonarda Em PTA 11/19/2020  9:48 AM    Future Appointments   Date Time Provider Lena Corrales   11/19/2020 10:00 AM Arlyn Alexander PTA [facility]   11/24/2020  9:00 AM Mickey Winters [facility]   12/4/2020  3:00 PM Payton Morataya MD [facility]

## 2020-11-24 ENCOUNTER — HOSPITAL ENCOUNTER (OUTPATIENT)
Dept: PHYSICAL THERAPY | Age: 75
Discharge: HOME | End: 2020-11-24
Payer: MEDICARE

## 2020-11-24 PROCEDURE — 97112 NEUROMUSCULAR REEDUCATION: CPT

## 2020-11-24 PROCEDURE — 97110 THERAPEUTIC EXERCISES: CPT

## 2020-11-24 NOTE — PROGRESS NOTES
Physical Therapy Discharge Instructions      [facility] [address]  [place], [address]  [phone] fax    Patient: Louis Xavier  : 1945      Continue Home Exercise Program 1 times per day for 4 weeks, then decrease to 4 times per week      Continue with    [] Ice  as needed 2 times per day     [x] Heat           Follow up with MD:     [] Upon completion of therapy     [x] As needed      Recommendations:     [x]   Return to activity with home program    []   Return to activity with the following modifications:       []Post Rehab Program    []Join Independent aquatic program     []Return to/join local gym              Alina Luu PT, DPT 2020 9:16 AM

## 2020-11-24 NOTE — PROGRESS NOTES
PT DAILY TREATMENT NOTE     Patient Name: Manjula Weeks  Date:2020  : 1945  [x]  Patient  Verified  Payor: VA MEDICARE / Plan: VA MEDICARE PART A & B / Product Type: Medicare /    In time:900  Out time:1000  Total Treatment Time (min): 60  Visit #: 6 of 6    Medicare/BCBS Only   Total Timed Codes (min):  50 1:1 Treatment Time:  50       Treatment Area: Pain in right hip [M25.551]    SUBJECTIVE  Pain Level (0-10 scale): 0  Any medication changes, allergies to medications, adverse drug reactions, diagnosis change, or new procedure performed?: [x] No    [] Yes (see summary sheet for update)  Subjective functional status/changes:   [] No changes reported  Patient denies right hip pain today but reports discomfort in back.      OBJECTIVE    Modality rationale: decrease pain and increase tissue extensibility to improve the patients ability to perform functional mobility with greater ease    Min Type Additional Details    [] Estim:  []Unatt       []IFC  []Premod                        []Other:  []w/ice   []w/heat  Position:  Location:    [] Estim: []Att    []TENS instruct  []NMES                    []Other:  []w/US   []w/ice   []w/heat  Position:  Location:    []  Traction: [] Cervical       []Lumbar                       [] Prone          []Supine                       []Intermittent   []Continuous Lbs:  [] before manual  [] after manual    []  Ultrasound: []Continuous   [] Pulsed                           []1MHz   []3MHz W/cm2:  Location:    []  Iontophoresis with dexamethasone         Location: [] Take home patch   [] In clinic   10 []  Ice     [x]  heat  []  Ice massage  []  Laser   []  Anodyne Position: sitting   Location: low back/hips     []  Laser with stim  []  Other:  Position:  Location:    []  Vasopneumatic Device Pressure:       [] lo [] med [] hi   Temperature: [] lo [] med [] hi   [x] Skin assessment post-treatment:  [x]intact []redness- no adverse reaction    []redness - adverse reaction:     35 min Therapeutic Exercise:  [x] See flow sheet :   Rationale: increase ROM and increase strength to improve the patients ability to perform ADLs and self care tasks with greater ease      15 min Neuromuscular Re-education:  [x]  See flow sheet: balance exercises, sit to stands on airex, marches on airex, bridges for improved glute stability    Rationale: increase strength, improve coordination, improve balance and increase proprioception  to improve the patients ability to perform functional mobility with improved stability     With   [] TE   [] TA   [] neuro   [] other: Patient Education: [x] Review HEP    [] Progressed/Changed HEP based on:   [] positioning   [] body mechanics   [] transfers   [] heat/ice application    [] other:         Pain Level (0-10 scale) post treatment: 0    ASSESSMENT/Changes in Function: Patient presents for last visit on current plan of care. He has made good progress with long term goals thus far and has had virtually no pain in right hip for last few weeks. He has demonstrated improvements in balance, LE strength, transfers, stair negotiation, and ambulation tolerance. Patient reports 100% improvement in left hip symptoms since start of care. Updated HEP based on patient's progress in therapy for patient to continue gains made throughout duration. []  See Plan of Care  []  See progress note/recertification  [x]  See Discharge Summary         Progress towards goals / Updated goals:  1.  Patient will report right hip pain <2/10 with walking community distance showing improvements in functional mobility and return to prior level of function.              PN: patient reporting no \"pain\" but weakness in LE with ambulation but improving since initial evaluation.                Current: MET 11/9/2020 - patient reports no difficulty with stair negotiation   2. Patient will increase right lower extremity strength to 5/5 for greater ease with transfers, getting in/out of bed, and improved mobility.             PN: 5-/5 grossly               Current: Met 11/19/2020 - right lower extremity strength 5/5 no pain with testing. 5. Patient will improve FOTO score to >/= 61 to demonstrate return to prior level of function and to improve patients ability to perform household activities.                PN: 57   Current: Met 11/24/2020 - 65    PLAN  [x]  Upgrade activities as tolerated     []  Continue plan of care  []  Update interventions per flow sheet       []  Discharge due to:_  []  Other:_      Tiffany Garcia PT, DPT  11/24/2020  8:58 AM    Future Appointments   Date Time Provider Lena Corrales   11/24/2020  9:00 AM Recardo Cousin [facility]   12/4/2020  3:00 PM Olya Rainey MD [facility]

## 2020-11-24 NOTE — PROGRESS NOTES
[facility]  [address]  [facility], [address]  [phone] fax    Physical Therapy Discharge Summary  Patient name: Su Francois Start of Care: 10/5/2020   Referral source: Maria L Garrett MD : 1945   Medical/Treatment Diagnosis: Pain in right hip [M25.551]  Payor: VA MEDICARE / Plan: VA MEDICARE PART A & B / Product Type: Medicare /  Onset Date:2020     Prior Hospitalization: see medical history Provider#: [id number]   Medications: Verified on Patient Summary List    Comorbidities: arthritis, back pain, BMI >30, hearing impairment, high blood pressure, osteoporosis, prior surgery, prosthesis/implants, sleep dysfunction   Prior Level of Function: independent with ambulation and ADLs, working out (biking/swimming)   Visits from Start of Care: 14    Missed Visits: 0  Reporting Period : 10/5/2020 to 2020      Summary of Care:  1. Patient will report right hip pain <2/10 with walking community distance showing improvements in functional mobility and return to prior level of function.              PN: patient reporting no \"pain\" but weakness in LE with ambulation but improving since initial evaluation.                Current: Met - patient reports no difficulty with stair negotiation   2. Patient will increase right lower extremity strength to 5/5 for greater ease with transfers, getting in/out of bed, and improved mobility.             PN: 5-/5 grossly               Current: Met - right lower extremity strength 5/5 no pain with testing. 5. Patient will improve FOTO score to >/= 61 to demonstrate return to prior level of function and to improve patients ability to perform household activities.             PN: 57   Current: Met - 65      ASSESSMENT/RECOMMENDATIONS: Patient presents for last visit on current plan of care.  He has made excellent progress with long term goals thus far and has had virtually no pain in right hip for last few weeks. He has demonstrated improvements in balance, LE strength, transfers, stair negotiation, and ambulation tolerance. Patient reports 100% improvement in right hip symptoms since start of care. Updated HEP based on patient's progress in therapy for patient to continue gains made throughout.      [x]Discontinue therapy: [x]Patient has reached or is progressing toward set goals      []Patient is non-compliant or has abdicated      []Due to lack of appreciable progress towards set goals    Rudy Umaña, PT, DPT 11/24/2020 9:14 AM

## 2021-01-15 ENCOUNTER — OFFICE VISIT (OUTPATIENT)
Dept: ORTHOPEDIC SURGERY | Age: 76
End: 2021-01-15
Payer: MEDICARE

## 2021-01-15 VITALS
OXYGEN SATURATION: 98 % | SYSTOLIC BLOOD PRESSURE: 139 MMHG | HEART RATE: 67 BPM | BODY MASS INDEX: 42.66 KG/M2 | TEMPERATURE: 96.2 F | WEIGHT: 315 LBS | HEIGHT: 72 IN | DIASTOLIC BLOOD PRESSURE: 74 MMHG | RESPIRATION RATE: 16 BRPM

## 2021-01-15 DIAGNOSIS — M25.551 RIGHT HIP PAIN: Primary | ICD-10-CM

## 2021-01-15 DIAGNOSIS — M16.11 PRIMARY OSTEOARTHRITIS OF RIGHT HIP: ICD-10-CM

## 2021-01-15 PROCEDURE — G8510 SCR DEP NEG, NO PLAN REQD: HCPCS | Performed by: ORTHOPAEDIC SURGERY

## 2021-01-15 PROCEDURE — 1101F PT FALLS ASSESS-DOCD LE1/YR: CPT | Performed by: ORTHOPAEDIC SURGERY

## 2021-01-15 PROCEDURE — G8427 DOCREV CUR MEDS BY ELIG CLIN: HCPCS | Performed by: ORTHOPAEDIC SURGERY

## 2021-01-15 PROCEDURE — G8417 CALC BMI ABV UP PARAM F/U: HCPCS | Performed by: ORTHOPAEDIC SURGERY

## 2021-01-15 PROCEDURE — G8536 NO DOC ELDER MAL SCRN: HCPCS | Performed by: ORTHOPAEDIC SURGERY

## 2021-01-15 PROCEDURE — 3017F COLORECTAL CA SCREEN DOC REV: CPT | Performed by: ORTHOPAEDIC SURGERY

## 2021-01-15 PROCEDURE — 99214 OFFICE O/P EST MOD 30 MIN: CPT | Performed by: ORTHOPAEDIC SURGERY

## 2021-01-15 NOTE — PROGRESS NOTES
Patient: Tye Holm                MRN: [id number]       SSN: [id number]  YOB: 1945        AGE: 70 y.o. SEX: male  Body mass index is 43.13 kg/m². PCP: Yeimy Kulkarni MD  01/15/21    Dear Dr. Ankur Naranjo:    HISTORY:  I had the pleasure of reviewing Mr. Sage Pacheco in follow up. As you know, he is having ongoing back problems. Dr. Marion Haider is seeing him and he is due to have some more injections. We did an intraarticular injection for his right hip and some physical therapy and it has made a tremendous improvement. He is able to put shoes and socks on now. Still a little bit of difficulty trimming his toenails, but overall a significant improvement. He reports very little pain. Some mild stiffness associated with the right groin. Denies fevers, chills, night sweats or weight loss. PHYSICAL EXAMINATION:  He is walking much better unassisted, minimally antalgic component to the gait. BMI is right at 43. Left hip rotates fairly well. Right hip is a touch stiff, but within its range of motion is fairly comfortable, including about 10 degrees of internal rotation. Calf non tender. There is mild evidence of neuropathy. There is no foot drop. He looks well. RADIOGRAPHS:  I did review his previous x-rays, confirming moderately advanced arthritis of hip. PLAN:  I think eventually he will require hip replacement. He is doing quite nicely at this point with nonoperative measures. Therapies help considerably. I would like to see him back in about three months time. We will repeat the AP pelvis and can consider repeating the intraarticular injection. I suspect he is a year or two away from hip replacement surgery, but that would really be up to him and how things progress. I wanted to thank you for allowing me to share in his care and provide opinion and advice with regards to management.     Gene Ross MD        REVIEW OF SYSTEMS:      CON: negative  EYE: negative   ENT: negative  RESP: negative  GI:    negative   :  negative  MSK: Positive  A twelve point review of systems was completed, positives noted and all other systems were reviewed and are negative          Past Medical History:   Diagnosis Date    Arrhythmia     AFIB    Hypertension     Ill-defined condition     Graves Disease       Family History   Problem Relation Age of Onset    No Known Problems Mother     Diabetes Father     Diabetes Sister        Current Outpatient Medications   Medication Sig Dispense Refill    metoprolol succinate (TOPROL-XL) 100 mg tablet Take 100 mg by mouth daily.  levothyroxine (SYNTHROID) 150 mcg tablet Take 150 mcg by mouth Daily (before breakfast).  ROSUVASTATIN CALCIUM (CRESTOR PO) Take 10 mg by mouth daily.  fluocinoNIDE (LIDEX) 0.05 % topical cream       hydrOXYzine HCl (ATARAX) 25 mg tablet       topiramate (TOPAMAX) 25 mg tablet 1 tab PO QHS (Patient not taking: Reported on 2/15/2018) 90 Tab 1    topiramate (TOPAMAX) 50 mg tablet TAKE 1 TABLET AT BEDTIME (Patient not taking: Reported on 2/15/2018) 90 Tab 0    METOPROLOL SUCCINATE (TOPROL XL PO) Take  by mouth.          Allergies   Allergen Reactions    Atorvastatin Myalgia    Statins-Hmg-Coa Reductase Inhibitors Myalgia       Past Surgical History:   Procedure Laterality Date    HX HERNIA REPAIR  1990    HX THYROIDECTOMY      [place] SINUS SURGERY PROC UNLISTED  2011    [place] TOTAL HIP ARTHROPLASTY Left 2011    by Dr. Wenceslao Willis History     Socioeconomic History    Marital status:      Spouse name: Not on file    Number of children: Not on file    Years of education: Not on file    Highest education level: Not on file   Occupational History    Not on file   Social Needs    Financial resource strain: Not on file    Food insecurity     Worry: Not on file     Inability: Not on file    Transportation needs     Medical: Not on file     Non-medical: Not on file Tobacco Use    Smoking status: Former Smoker    Smokeless tobacco: Never Used   Substance and Sexual Activity    Alcohol use: Yes    Drug use: No    Sexual activity: Not on file   Lifestyle    Physical activity     Days per week: Not on file     Minutes per session: Not on file    Stress: Not on file   Relationships    Social connections     Talks on phone: Not on file     Gets together: Not on file     Attends Rastafari service: Not on file     Active member of club or organization: Not on file     Attends meetings of clubs or organizations: Not on file     Relationship status: Not on file    Intimate partner violence     Fear of current or ex partner: Not on file     Emotionally abused: Not on file     Physically abused: Not on file     Forced sexual activity: Not on file   Other Topics Concern    Not on file   Social History Narrative    Not on file       Visit Vitals  /74 (BP 1 Location: Right arm)   Pulse 67   Temp (!) 96.2 °F (35.7 °C) (Temporal)   Resp 16   Ht 6' (1.829 m)   Wt 144.2 kg (318 lb)   SpO2 98%   BMI 43.13 kg/m²         PHYSICAL EXAMINATION:  GENERAL: Alert and oriented x3, in no acute distress, well-developed, well-nourished, afebrile. HEART: No JVD. EYES: No scleral icterus   NECK: No significant lymphadenopathy   LUNGS: No respiratory compromise or indrawing  ABDOMEN: Soft, non-tender, non-distended. Electronically signed by:  Charmayne Pitcher, MD

## 2021-05-13 ENCOUNTER — OFFICE VISIT (OUTPATIENT)
Dept: ORTHOPEDIC SURGERY | Age: 76
End: 2021-05-13
Payer: MEDICARE

## 2021-05-13 VITALS
BODY MASS INDEX: 42.66 KG/M2 | HEART RATE: 75 BPM | OXYGEN SATURATION: 100 % | HEIGHT: 72 IN | TEMPERATURE: 97.1 F | WEIGHT: 315 LBS

## 2021-05-13 DIAGNOSIS — M48.10 DISH (DIFFUSE IDIOPATHIC SKELETAL HYPEROSTOSIS): ICD-10-CM

## 2021-05-13 DIAGNOSIS — M25.551 RIGHT HIP PAIN: ICD-10-CM

## 2021-05-13 DIAGNOSIS — M16.11 PRIMARY OSTEOARTHRITIS OF RIGHT HIP: Primary | ICD-10-CM

## 2021-05-13 PROCEDURE — G8536 NO DOC ELDER MAL SCRN: HCPCS | Performed by: ORTHOPAEDIC SURGERY

## 2021-05-13 PROCEDURE — G8510 SCR DEP NEG, NO PLAN REQD: HCPCS | Performed by: ORTHOPAEDIC SURGERY

## 2021-05-13 PROCEDURE — 99214 OFFICE O/P EST MOD 30 MIN: CPT | Performed by: ORTHOPAEDIC SURGERY

## 2021-05-13 PROCEDURE — G8417 CALC BMI ABV UP PARAM F/U: HCPCS | Performed by: ORTHOPAEDIC SURGERY

## 2021-05-13 PROCEDURE — 3017F COLORECTAL CA SCREEN DOC REV: CPT | Performed by: ORTHOPAEDIC SURGERY

## 2021-05-13 PROCEDURE — G8427 DOCREV CUR MEDS BY ELIG CLIN: HCPCS | Performed by: ORTHOPAEDIC SURGERY

## 2021-05-13 PROCEDURE — 1101F PT FALLS ASSESS-DOCD LE1/YR: CPT | Performed by: ORTHOPAEDIC SURGERY

## 2021-05-13 PROCEDURE — 72170 X-RAY EXAM OF PELVIS: CPT | Performed by: ORTHOPAEDIC SURGERY

## 2021-05-13 NOTE — PROGRESS NOTES
Patient: Haylee Navarrete                MRN: [id number]       SSN: [id number]  YOB: 1945        AGE: 70 y.o. SEX: male  Body mass index is 42.99 kg/m².     PCP: Yusuf Brown MD  05/13/21    Gina Rivera returns for reevaluation of right hip pain the injection worked dramatically but it is slowly worsening in his activity levels decreasing he is interested in having surgery in a year or 2 and we did discuss different approaches direct anterior versus anterolateral lateral versus posterior and has had an anterolateral in the past and done extremely well if he wants a direct anterior his body and body mass index off to be under 35 to consider that and is currently at 37 I suspect will be doing an anterolateral and comes time for the right hip ongoing back problems as well uses cane only sporadically denies fevers chills night sweats or weight loss otherwise feeling well in the exam today he does have a mildly antalgic gait onto the right side left hip looks great low back somewhat tender mild evidence of neuropathy slight peripheral edema both feet warm and well perfused    The right hip has about 8 degrees of internal rotation minute reproduces his groin and thigh discomfort    AP pelvis today 5/13/2021 confirms advancing arthritis involving the hip itself with a little bit of cartilage remaining and I did review the x-rays with him and his [employer] hip replacement [address] on the left side looks terrific but was incompletely imaged because we just did the AP pelvis today    I recommend intra-articular injection for him and there was a discussion regarding surgery its not currently recommended he is still doing well with nonoperative measures and I like there to be some weight loss at least under the BMI of 40 been a pleasure to share in his care we will see him back in 3 months time repeat AP pelvis    REVIEW OF SYSTEMS:      CON: negative  EYE: negative   ENT: negative  RESP: negative  GI:    negative   :  negative  MSK: Positive  A twelve point review of systems was completed, positives noted and all other systems were reviewed and are negative          Past Medical History:   Diagnosis Date    Arrhythmia     AFIB    Hypertension     Ill-defined condition     Graves Disease       Family History   Problem Relation Age of Onset    No Known Problems Mother     Diabetes Father     Diabetes Sister        Current Outpatient Medications   Medication Sig Dispense Refill    metoprolol succinate (TOPROL-XL) 100 mg tablet Take 100 mg by mouth daily.  levothyroxine (SYNTHROID) 150 mcg tablet Take 150 mcg by mouth Daily (before breakfast).  ROSUVASTATIN CALCIUM (CRESTOR PO) Take 10 mg by mouth daily.  fluocinoNIDE (LIDEX) 0.05 % topical cream       hydrOXYzine HCl (ATARAX) 25 mg tablet       topiramate (TOPAMAX) 25 mg tablet 1 tab PO QHS (Patient not taking: Reported on 2/15/2018) 90 Tab 1    topiramate (TOPAMAX) 50 mg tablet TAKE 1 TABLET AT BEDTIME (Patient not taking: Reported on 2/15/2018) 90 Tab 0    METOPROLOL SUCCINATE (TOPROL XL PO) Take  by mouth.          Allergies   Allergen Reactions    Atorvastatin Myalgia    Statins-Hmg-Coa Reductase Inhibitors Myalgia       Past Surgical History:   Procedure Laterality Date    HX HERNIA REPAIR  1990    HX THYROIDECTOMY      [place] SINUS SURGERY PROC UNLISTED  2011    [place] TOTAL HIP ARTHROPLASTY Left 2011    by Dr. Lima Quesada History     Socioeconomic History    Marital status:      Spouse name: Not on file    Number of children: Not on file    Years of education: Not on file    Highest education level: Not on file   Occupational History    Not on file   Social Needs    Financial resource strain: Not on file    Food insecurity     Worry: Not on file     Inability: Not on file    Transportation needs     Medical: Not on file     Non-medical: Not on file   Tobacco Use    Smoking status: Former Smoker  Smokeless tobacco: Never Used   Substance and Sexual Activity    Alcohol use: Yes    Drug use: No    Sexual activity: Not on file   Lifestyle    Physical activity     Days per week: Not on file     Minutes per session: Not on file    Stress: Not on file   Relationships    Social connections     Talks on phone: Not on file     Gets together: Not on file     Attends Muslim service: Not on file     Active member of club or organization: Not on file     Attends meetings of clubs or organizations: Not on file     Relationship status: Not on file    Intimate partner violence     Fear of current or ex partner: Not on file     Emotionally abused: Not on file     Physically abused: Not on file     Forced sexual activity: Not on file   Other Topics Concern    Not on file   Social History Narrative    Not on file       Visit Vitals  Pulse 75   Temp 97.1 °F (36.2 °C) (Temporal)   Ht 6' (1.829 m)   Wt 317 lb (143.8 kg)   SpO2 100%   BMI 42.99 kg/m²         PHYSICAL EXAMINATION:  GENERAL: Alert and oriented x3, in no acute distress, well-developed, well-nourished, afebrile. HEART: No JVD. EYES: No scleral icterus   NECK: No significant lymphadenopathy   LUNGS: No respiratory compromise or indrawing  ABDOMEN: Soft, non-tender, non-distended. Electronically signed by:  Sheryle Pollard, MD

## 2021-05-20 DIAGNOSIS — M25.551 RIGHT HIP PAIN: ICD-10-CM

## 2021-05-20 DIAGNOSIS — M16.11 PRIMARY OSTEOARTHRITIS OF RIGHT HIP: ICD-10-CM

## 2021-06-01 ENCOUNTER — HOSPITAL ENCOUNTER (OUTPATIENT)
Dept: GENERAL RADIOLOGY | Age: 76
Discharge: HOME | End: 2021-06-01
Attending: ORTHOPAEDIC SURGERY
Payer: MEDICARE

## 2021-06-01 PROCEDURE — 74011000250 HC RX REV CODE- 250: Performed by: ORTHOPAEDIC SURGERY

## 2021-06-01 PROCEDURE — 74011000636 HC RX REV CODE- 636: Performed by: ORTHOPAEDIC SURGERY

## 2021-06-01 PROCEDURE — 77030018868 XR FLUORO GUIDE ASP/BX/INJ/LOC

## 2021-06-01 PROCEDURE — 74011250636 HC RX REV CODE- 250/636: Performed by: ORTHOPAEDIC SURGERY

## 2021-06-01 RX ORDER — LIDOCAINE HYDROCHLORIDE 10 MG/ML
5 INJECTION, SOLUTION EPIDURAL; INFILTRATION; INTRACAUDAL; PERINEURAL
Status: COMPLETED | OUTPATIENT
Start: 2021-06-01 | End: 2021-06-01

## 2021-06-01 RX ORDER — METHYLPREDNISOLONE ACETATE 40 MG/ML
40 INJECTION, SUSPENSION INTRA-ARTICULAR; INTRALESIONAL; INTRAMUSCULAR; SOFT TISSUE
Status: COMPLETED | OUTPATIENT
Start: 2021-06-01 | End: 2021-06-01

## 2021-06-01 RX ADMIN — LIDOCAINE HYDROCHLORIDE 5 ML: 10 INJECTION, SOLUTION EPIDURAL; INFILTRATION; INTRACAUDAL; PERINEURAL at 13:37

## 2021-06-01 RX ADMIN — IOHEXOL 1 ML: 240 INJECTION, SOLUTION INTRATHECAL; INTRAVASCULAR; INTRAVENOUS; ORAL at 13:37

## 2021-06-01 RX ADMIN — METHYLPREDNISOLONE ACETATE 40 MG: 40 INJECTION, SUSPENSION INTRA-ARTICULAR; INTRALESIONAL; INTRAMUSCULAR; SOFT TISSUE at 13:37

## 2021-07-20 NOTE — PROGRESS NOTES
[facility]  [address], [place]   Phone: [phone]  Fax: [phone]        PROGRESS NOTE      HISTORY OF PRESENT ILLNESS:  The patient is a 70 y.o. male and was seen today for follow up of  low back pain, previously radiating into the right groin and RLE to the knee. Previously, he was seen for paraesthesias in between the shoulder blades radiating around into the chest. Previously, he was seen for complaints of low back pain. He denies radicular symptoms at this time. Previously, he had c/o low back pain into the BLE radiating circumferentially to the calves with symptoms consistent for stenosis. He reported an increase in pain at night. His groin pain is exacerbated by hip flexion and extention and with activities such as putting on shoes and socks. He reports a loss in ROM of his right hip. He reports lumbar epidural to the L3-L4 interlaminar space on 8/25/15 provided significant relief. Pt underwent a right hip intraarticular injection with significant benefit. Pt underwent epidural L3-4 on 9/1/2020 with significant relief. He states he has not had any flare ups of severe pain. Pt reports he was able to reduce his Topamax dose to 50 mg qhs since 1/2017 without an increase in pain. He states low back and BLE pain has essentially resolved with rare increase in pain with activity. Pt previously underwent a left total hip replacement by Dr. Jenniffer Pacheco. Note from Dr. James Alcantar 8/7/2020 indicating patient was seen with c/o right sided hip and groin pain. Pt has difficulty putting shoes and socks on, and pain at night. Pt has noticed more radicular pain into his RLE. 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 Indicated he may need a RT hip replacement but thought he should try a hip injection first. Ordered a L spine MRI.  Preliminary reading of thoracic plain films: Limited study due to body habitus. Degenerative changes noted throughout the thoracic spine. No acute pathology identified. Preliminary reading of Pelvis plain films dated 7/2/2020 revealed: Moderately severe joint space narrowing throughout the right hip. L spine MRI dated 8/19/2020 films independently reviewed. Per report, diffuse disc bulge with superimposed left paracentral inferiorly directed extrusion at L2-L3, contributing to moderate spinal canal narrowing at the L2-L3 disc level and just below. Additional underlying degenerative disc disease and facet arthropathy also produce the following: Moderate to severe spinal canal stenosis at L4-L5. Moderate spinal canal stenosis at 3-L4 and L5-S1. 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 At his last clinic appointment, did not think his remaining pain complaints were severe enough to warrant additional workup/treatment at the time.        The patient returns today with low back pain that radiates into the RLE in a S1 distribution to the knee after injection of right hip x 3/2021. He rates his pain 7-10/10, previously 0-1/10. His pain is aggravated through activity. His pain is exacerbated by lying down at night. Pt called office for a possible injection. Pt denies hx of diabetes. Pt denies taking anticoagulants. Pt denies change in bowel or bladder habits.  reviewed. Body mass index is 42.07 kg/m².     PCP: Joe Jin MD      Past Medical History:   Diagnosis Date    Arrhythmia     AFIB    Hypertension     Ill-defined condition     Graves Disease        Social History     Socioeconomic History    Marital status:      Spouse name: Not on file    Number of children: Not on file    Years of education: Not on file    Highest education level: Not on file   Occupational History    Not on file   Tobacco Use    Smoking status: Former Smoker    Smokeless tobacco: Never Used   Substance and Sexual Activity    Alcohol use: Yes    Drug use: No    Sexual activity: Not on file   Other Topics Concern    Not on file   Social History Narrative    Not on file     Social Determinants of Health     Financial Resource Strain:     Difficulty of Paying Living Expenses:    Food Insecurity:     Worried About Running Out of Food in the Last Year:     [address] in the Last Year:    Transportation Needs:     Lack of Transportation (Medical):  Lack of Transportation (Non-Medical):    Physical Activity:     Days of Exercise per Week:     Minutes of Exercise per Session:    Stress:     Feeling of Stress :    Social Connections:     Frequency of Communication with Friends and Family:     Frequency of Social Gatherings with Friends and Family:     Attends Worship Services:     Active Member of Clubs or Organizations:     Attends Club or Organization Meetings:     Marital Status:    Intimate Partner Violence:     Fear of Current or Ex-Partner:     Emotionally Abused:     Physically Abused:     Sexually Abused:        Current Outpatient Medications   Medication Sig Dispense Refill    metoprolol succinate (TOPROL-XL) 100 mg tablet Take 100 mg by mouth daily.  levothyroxine (SYNTHROID) 150 mcg tablet Take 150 mcg by mouth Daily (before breakfast).  ROSUVASTATIN CALCIUM (CRESTOR PO) Take 10 mg by mouth daily.  fluocinoNIDE (LIDEX) 0.05 % topical cream       hydrOXYzine HCl (ATARAX) 25 mg tablet       topiramate (TOPAMAX) 25 mg tablet 1 tab PO QHS (Patient not taking: Reported on 2/15/2018) 90 Tab 1    topiramate (TOPAMAX) 50 mg tablet TAKE 1 TABLET AT BEDTIME (Patient not taking: Reported on 2/15/2018) 90 Tab 0    METOPROLOL SUCCINATE (TOPROL XL PO) Take  by mouth.          Allergies   Allergen Reactions    Atorvastatin Myalgia    Statins-Hmg-Coa Reductase Inhibitors Myalgia          PHYSICAL EXAMINATION    Visit Vitals  BP (!) 151/82 (BP 1 Location: Left arm, BP Patient Position: Sitting)   Pulse (!) 58   Temp 97.1 °F (36.2 °C) (Temporal)   Resp 18   Ht 6' (1.829 m)   Wt 310 lb 3.2 oz (140.7 kg)   SpO2 98%   BMI 42.07 kg/m²       CONSTITUTIONAL: NAD, A&O x 3  SENSATION: Decreased sensation to light touch on the RLE in a L4 distribution. Otherwise, Intact to light touch throughout  RANGE OF MOTION: The patient has full passive range of motion in all four extremities. MOTOR:  Straight Leg Raise: Negative, bilateral               Hip Flex Knee Ext Knee Flex Ankle DF GTE Ankle PF Tone   Right +4/5 +4/5 +4/5 +4/5 +4/5 +4/5 +4/5   Left +4/5 +4/5 +4/5 +4/5 +4/5 +4/5 +4/5       ASSESSMENT   Diagnoses and all orders for this visit:    1. Spinal stenosis of lumbar region, unspecified whether neurogenic claudication present    2. DDD (degenerative disc disease), lumbar    3. Osteoarthritis of right hip, unspecified osteoarthritis type    4. HNP (herniated nucleus pulposus), lumbar      IMPRESSION AND PLAN:  Patient returns to the office today with c/o low back pain that radiates into the RLE in a S1 distribution to the knee. Multiple treatment options were discussed. Clinically I think his symptoms are related to what has been discussed before. Per patient's request, I will order an epidural L3-4. Patient is neurologically intact. I will see the patient back after block or earlier if needed. Written by Jb Loyd, as dictated by Alfonzo Botello MD  I examined the patient, reviewed and agree with the note.

## 2021-07-21 ENCOUNTER — OFFICE VISIT (OUTPATIENT)
Dept: ORTHOPEDIC SURGERY | Age: 76
End: 2021-07-21
Payer: MEDICARE

## 2021-07-21 VITALS
RESPIRATION RATE: 18 BRPM | HEART RATE: 58 BPM | SYSTOLIC BLOOD PRESSURE: 151 MMHG | OXYGEN SATURATION: 98 % | BODY MASS INDEX: 42.01 KG/M2 | TEMPERATURE: 97.1 F | HEIGHT: 72 IN | WEIGHT: 310.2 LBS | DIASTOLIC BLOOD PRESSURE: 82 MMHG

## 2021-07-21 DIAGNOSIS — M48.061 SPINAL STENOSIS OF LUMBAR REGION, UNSPECIFIED WHETHER NEUROGENIC CLAUDICATION PRESENT: Primary | ICD-10-CM

## 2021-07-21 DIAGNOSIS — M16.11 OSTEOARTHRITIS OF RIGHT HIP, UNSPECIFIED OSTEOARTHRITIS TYPE: ICD-10-CM

## 2021-07-21 DIAGNOSIS — M51.36 DDD (DEGENERATIVE DISC DISEASE), LUMBAR: ICD-10-CM

## 2021-07-21 DIAGNOSIS — M51.26 HNP (HERNIATED NUCLEUS PULPOSUS), LUMBAR: ICD-10-CM

## 2021-07-21 PROCEDURE — G8536 NO DOC ELDER MAL SCRN: HCPCS | Performed by: PHYSICAL MEDICINE & REHABILITATION

## 2021-07-21 PROCEDURE — G8417 CALC BMI ABV UP PARAM F/U: HCPCS | Performed by: PHYSICAL MEDICINE & REHABILITATION

## 2021-07-21 PROCEDURE — 1101F PT FALLS ASSESS-DOCD LE1/YR: CPT | Performed by: PHYSICAL MEDICINE & REHABILITATION

## 2021-07-21 PROCEDURE — G8427 DOCREV CUR MEDS BY ELIG CLIN: HCPCS | Performed by: PHYSICAL MEDICINE & REHABILITATION

## 2021-07-21 PROCEDURE — G8432 DEP SCR NOT DOC, RNG: HCPCS | Performed by: PHYSICAL MEDICINE & REHABILITATION

## 2021-07-21 PROCEDURE — 99213 OFFICE O/P EST LOW 20 MIN: CPT | Performed by: PHYSICAL MEDICINE & REHABILITATION

## 2021-07-21 PROCEDURE — 3017F COLORECTAL CA SCREEN DOC REV: CPT | Performed by: PHYSICAL MEDICINE & REHABILITATION

## 2021-07-21 NOTE — LETTER
7/21/2021    Patient: Lesa Malhotra   YOB: 1945   Date of Visit: 7/21/2021     Brent Reece MD  [address]  [facility] [place] [id number]  Via Fax: [phone]    Dear Brent Reece MD,      Thank you for referring Mr. Vincent Morrison to [address] for evaluation. My notes for this consultation are attached. If you have questions, please do not hesitate to call me. I look forward to following your patient along with you.       Sincerely,    Lilibeth Acosta MD

## 2021-08-12 ENCOUNTER — OFFICE VISIT (OUTPATIENT)
Dept: ORTHOPEDIC SURGERY | Age: 76
End: 2021-08-12
Payer: MEDICARE

## 2021-08-12 VITALS
TEMPERATURE: 97 F | WEIGHT: 307.2 LBS | HEART RATE: 73 BPM | HEIGHT: 72 IN | OXYGEN SATURATION: 95 % | BODY MASS INDEX: 41.61 KG/M2

## 2021-08-12 DIAGNOSIS — Z96.642 HISTORY OF LEFT HIP REPLACEMENT: ICD-10-CM

## 2021-08-12 DIAGNOSIS — M16.11 PRIMARY OSTEOARTHRITIS OF RIGHT HIP: Primary | ICD-10-CM

## 2021-08-12 PROCEDURE — 99214 OFFICE O/P EST MOD 30 MIN: CPT | Performed by: PHYSICIAN ASSISTANT

## 2021-08-12 PROCEDURE — G8432 DEP SCR NOT DOC, RNG: HCPCS | Performed by: PHYSICIAN ASSISTANT

## 2021-08-12 PROCEDURE — G8417 CALC BMI ABV UP PARAM F/U: HCPCS | Performed by: PHYSICIAN ASSISTANT

## 2021-08-12 PROCEDURE — G8427 DOCREV CUR MEDS BY ELIG CLIN: HCPCS | Performed by: PHYSICIAN ASSISTANT

## 2021-08-12 PROCEDURE — 3017F COLORECTAL CA SCREEN DOC REV: CPT | Performed by: PHYSICIAN ASSISTANT

## 2021-08-12 PROCEDURE — G8536 NO DOC ELDER MAL SCRN: HCPCS | Performed by: PHYSICIAN ASSISTANT

## 2021-08-12 PROCEDURE — 1101F PT FALLS ASSESS-DOCD LE1/YR: CPT | Performed by: PHYSICIAN ASSISTANT

## 2021-08-12 NOTE — PROGRESS NOTES
[address] [place] [address]  [phone]           Patient: Zeny Chao                MRN: [id number]       SSN: [id number]  YOB: 1945        AGE: 70 y.o. SEX: male  Body mass index is 41.66 kg/m². PCP: Yvette Blevins MD  08/12/21      This office note has been dictated. REVIEW OF SYSTEMS:  Constitutional: Negative for fever, chills, weight loss and malaise/fatigue. HENT: Negative. Eyes: Negative. Respiratory: Negative. Cardiovascular: Negative. Gastrointestinal: No bowel incontinence or constipation. Genitourinary: No bladder incontinence or saddle anesthesia. Skin: Negative. Neurological: Negative. Endo/Heme/Allergies: Negative. Psychiatric/Behavioral: Negative. Musculoskeletal: As per HPI above. Past Medical History:   Diagnosis Date    Arrhythmia     AFIB    Chronic right hip pain     Hypertension     Ill-defined condition     Graves Disease         Current Outpatient Medications:     metoprolol succinate (TOPROL-XL) 100 mg tablet, Take 100 mg by mouth daily. , Disp: , Rfl:     levothyroxine (SYNTHROID) 150 mcg tablet, Take 150 mcg by mouth Daily (before breakfast). , Disp: , Rfl:     ROSUVASTATIN CALCIUM (CRESTOR PO), Take 10 mg by mouth daily. , Disp: , Rfl:     fluocinoNIDE (LIDEX) 0.05 % topical cream, , Disp: , Rfl:     hydrOXYzine HCl (ATARAX) 25 mg tablet, , Disp: , Rfl:     topiramate (TOPAMAX) 25 mg tablet, 1 tab PO QHS (Patient not taking: Reported on 2/15/2018), Disp: 90 Tab, Rfl: 1    topiramate (TOPAMAX) 50 mg tablet, TAKE 1 TABLET AT BEDTIME (Patient not taking: Reported on 2/15/2018), Disp: 90 Tab, Rfl: 0    METOPROLOL SUCCINATE (TOPROL XL PO), Take  by mouth., Disp: , Rfl:     Allergies   Allergen Reactions    Atorvastatin Myalgia    Statins-Hmg-Coa Reductase Inhibitors Myalgia    Stings [Sting, Bee] Swelling       Social History Socioeconomic History    Marital status:      Spouse name: Not on file    Number of children: Not on file    Years of education: Not on file    Highest education level: Not on file   Occupational History    Not on file   Tobacco Use    Smoking status: Former Smoker    Smokeless tobacco: Never Used   Substance and Sexual Activity    Alcohol use: Yes    Drug use: No    Sexual activity: Not on file   Other Topics Concern    Not on file   Social History Narrative    Not on file     Social Determinants of Health     Financial Resource Strain:     Difficulty of Paying Living Expenses:    Food Insecurity:     Worried About Running Out of Food in the Last Year:     [address] in the Last Year:    Transportation Needs:     Lack of Transportation (Medical):  Lack of Transportation (Non-Medical):    Physical Activity:     Days of Exercise per Week:     Minutes of Exercise per Session:    Stress:     Feeling of Stress :    Social Connections:     Frequency of Communication with Friends and Family:     Frequency of Social Gatherings with Friends and Family:     Attends [employer] Services:     Active Member of Clubs or Organizations:     Attends Club or Organization Meetings:     Marital Status:    Intimate Partner Violence:     Fear of Current or Ex-Partner:     Emotionally Abused:     Physically Abused:     Sexually Abused:        Past Surgical History:   Procedure Laterality Date    HX HERNIA REPAIR  1990    HX THYROIDECTOMY      [place] SINUS SURGERY [address] UNLISTED  2011    [place] TOTAL HIP ARTHROPLASTY Left 2011    by Dr. Jeannine Vela           Patient seen evaluate today for bilateral hips. He has had a previous left hip replacement done approximate 10 years ago did very well with it. He does have known advanced arthritis of the right hip and his last intra-articular injection  2 months ago gave him pretty good relief. He was having back problems as well.   This is followed by  Joao Dill. No change in bowel bladder habits. Patient denies recent fevers, chills, chest pain, SOB, or injuries. No recent systemic changes noted. A 12-point review of systems is performed today. Pertinent positives are noted. All other systems reviewed and otherwise are negative. Physical exam: General: Alert and oriented x3, nad.  well-developed, well nourished. normal affect, AF. NC/AT, EOMI, neck supple, trachea midline, no JVD present. Breathing is non-labored. Examination of lower extremities reveals decreased range of motion the right hip with reproduction of groin discomfort. The left hip moves well. Negative straight leg raise. Negative calf tenderness. Negative Homans. No signs of DVT present. Assessment: #1 right hip end-stage osteoarthritis, #2 status post left hip replacement doing well, #3 lumbar radiculopathy    Plan: At this point, we discussed treatment options. We will get him set up for repeat intra-articular injection of the right hip next month. We discussed surgical intervention in total hip replacements. We will continue with conservative treatment for the time being. He was instructed on weight loss and needs to lose approximate 13 pounds to have his BMI is 40. He understands this needs to be done prior to surgical intervention. He will continue the home exercise program and try to maintain range of motion activities.           JR Rafat ALMANZA PA-C, ATC

## 2021-08-24 NOTE — PROGRESS NOTES
[facility]  [address], [place]   Phone: [phone]  Fax: [phone]        PROGRESS NOTE      HISTORY OF PRESENT ILLNESS:  The patient is a 70 y.o. male and was seen today for follow up of low back pain that radiates into the RLE in a S1 distribution to the knee after injection of right hip x 3/2021. His pain is aggravated through activity and by lying down at night. Previously seen for low back pain, previously radiating into the right groin and RLE to the knee. Previously, he was seen for paraesthesias in between the shoulder blades radiating around into the chest. Previously, he was seen for complaints of low back pain. He denies radicular symptoms at this time. Previously, he had c/o low back pain into the BLE radiating circumferentially to the calves with symptoms consistent for stenosis. He reported an increase in pain at night. His groin pain is exacerbated by hip flexion and extention and with activities such as putting on shoes and socks. He reports a loss in ROM of his right hip. He reports lumbar epidural to the L3-L4 interlaminar space on 8/25/15 provided significant relief. Pt underwent a right hip intraarticular injection with significant benefit. Pt underwent epidural L3-4 on 9/1/2020 with significant relief. He states he has not had any flare ups of severe pain. Pt reports he was able to reduce his Topamax dose to 50 mg qhs since 1/2017 without an increase in pain. He states low back and BLE pain has essentially resolved with rare increase in pain with activity. Pt previously underwent a left total hip replacement by Dr. Jeannine Vela. Note from Dr. Alia Crockett 8/7/2020 indicating patient was seen with c/o right sided hip and groin pain. Pt has difficulty putting shoes and socks on, and pain at night. Pt has noticed more radicular pain into his RLE. 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 Indicated he may need a RT hip replacement but thought he should try a hip injection first. Ordered a L spine MRI. Preliminary reading of thoracic plain films: Limited study due to body habitus. Degenerative changes noted throughout the thoracic spine. No acute pathology identified. Preliminary reading of Pelvis plain films dated 7/2/2020 revealed: Moderately severe joint space narrowing throughout the right hip. L spine MRI dated 8/19/2020 films independently reviewed. Per report, diffuse disc bulge with superimposed left paracentral inferiorly directed extrusion at L2-L3, contributing to moderate spinal canal narrowing at the L2-L3 disc level and just below. Additional underlying degenerative disc disease and facet arthropathy also produce the following: Moderate to severe spinal canal stenosis at L4-L5. Moderate spinal canal stenosis at 3-L4 and L5-S1. 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 At his last clinic appointment, clinically I think his symptoms are related to what has been discussed before. Per patient's request, I ordered an epidural L3-4.       The patient returns today with lateral right hip pain. He rates his pain 2-3/10, previously 7-10/10. Pt notes an improvement in his right buttocks and low back pain. Pt underwent an epidural L3-4 on 7/27/2021 with good relief. Pt denies change in bowel or bladder habits.  reviewed. Body mass index is 41.77 kg/m².     PCP: Marino Angelo MD      Past Medical History:   Diagnosis Date    Arrhythmia     AFIB    Chronic right hip pain     Hypertension     Ill-defined condition     Graves Disease        Social History     Socioeconomic History    Marital status:      Spouse name: Not on file    Number of children: Not on file    Years of education: Not on file    Highest education level: Not on file   Occupational History    Not on file   Tobacco Use    Smoking status: Former Smoker    Smokeless tobacco: Never Used   Substance and Sexual Activity    Alcohol use: Yes    Drug use: No    Sexual activity: Not on file   Other Topics Concern    Not on file   Social History Narrative    Not on file     Social Determinants of Health     Financial Resource Strain:     Difficulty of Paying Living Expenses:    Food Insecurity:     Worried About Running Out of Food in the Last Year:     [address] in the Last Year:    Transportation Needs:     Lack of Transportation (Medical):  Lack of Transportation (Non-Medical):    Physical Activity:     Days of Exercise per Week:     Minutes of Exercise per Session:    Stress:     Feeling of Stress :    Social Connections:     Frequency of Communication with Friends and Family:     Frequency of Social Gatherings with Friends and Family:     Attends [employer] Services:     Active Member of Clubs or Organizations:     Attends Club or Organization Meetings:     Marital Status:    Intimate Partner Violence:     Fear of Current or Ex-Partner:     Emotionally Abused:     Physically Abused:     Sexually Abused:        Current Outpatient Medications   Medication Sig Dispense Refill    metoprolol succinate (TOPROL-XL) 100 mg tablet Take 100 mg by mouth daily.  levothyroxine (SYNTHROID) 150 mcg tablet Take 150 mcg by mouth Daily (before breakfast).  ROSUVASTATIN CALCIUM (CRESTOR PO) Take 10 mg by mouth daily. Allergies   Allergen Reactions    Atorvastatin Myalgia    Statins-Hmg-Coa Reductase Inhibitors Myalgia    Stings [Sting, Bee] Swelling          PHYSICAL EXAMINATION    Visit Vitals  /78 (BP 1 Location: Left arm, BP Patient Position: Sitting)   Pulse 69   Temp 97.2 °F (36.2 °C) (Temporal)   Resp 18   Ht 6' (1.829 m)   Wt 308 lb (139.7 kg)   SpO2 96%   BMI 41.77 kg/m²       CONSTITUTIONAL: NAD, A&O x 3  SENSATION: Decreased sensation to light touch on the RLE in a L4 distribution.  Otherwise, Intact to light touch throughout  RANGE OF MOTION: The patient has full passive range of motion in all four extremities. MOTOR:  Straight Leg Raise: Negative, bilateral               Hip Flex Knee Ext Knee Flex Ankle DF GTE Ankle PF Tone   Right +4/5 +4/5 +4/5 +4/5 +4/5 +4/5 +4/5   Left +4/5 +4/5 +4/5 +4/5 +4/5 +4/5 +4/5       ASSESSMENT   Diagnoses and all orders for this visit:    1. Spinal stenosis of lumbar region, unspecified whether neurogenic claudication present    2. DDD (degenerative disc disease), lumbar    3. Osteoarthritis of right hip, unspecified osteoarthritis type    4. HNP (herniated nucleus pulposus), lumbar      IMPRESSION AND PLAN:  Patient returns to the office today with c/o lateral right hip pain. Multiple treatment options were discussed. Patient wished to continue his current treatment. I encouraged him to continue to perform his daily HEP. Patient is neurologically intact. I will see the patient back prn. Written by Kasey Whiting, as dictated by Catrina Ortega MD  I examined the patient, reviewed and agree with the note.

## 2021-08-25 ENCOUNTER — OFFICE VISIT (OUTPATIENT)
Dept: ORTHOPEDIC SURGERY | Age: 76
End: 2021-08-25
Payer: MEDICARE

## 2021-08-25 VITALS
SYSTOLIC BLOOD PRESSURE: 134 MMHG | BODY MASS INDEX: 41.72 KG/M2 | HEART RATE: 69 BPM | RESPIRATION RATE: 18 BRPM | OXYGEN SATURATION: 96 % | HEIGHT: 72 IN | WEIGHT: 308 LBS | DIASTOLIC BLOOD PRESSURE: 78 MMHG | TEMPERATURE: 97.2 F

## 2021-08-25 DIAGNOSIS — M16.11 OSTEOARTHRITIS OF RIGHT HIP, UNSPECIFIED OSTEOARTHRITIS TYPE: ICD-10-CM

## 2021-08-25 DIAGNOSIS — M48.061 SPINAL STENOSIS OF LUMBAR REGION, UNSPECIFIED WHETHER NEUROGENIC CLAUDICATION PRESENT: Primary | ICD-10-CM

## 2021-08-25 DIAGNOSIS — M51.36 DDD (DEGENERATIVE DISC DISEASE), LUMBAR: ICD-10-CM

## 2021-08-25 DIAGNOSIS — M51.26 HNP (HERNIATED NUCLEUS PULPOSUS), LUMBAR: ICD-10-CM

## 2021-08-25 PROCEDURE — G8427 DOCREV CUR MEDS BY ELIG CLIN: HCPCS | Performed by: PHYSICAL MEDICINE & REHABILITATION

## 2021-08-25 PROCEDURE — G8417 CALC BMI ABV UP PARAM F/U: HCPCS | Performed by: PHYSICAL MEDICINE & REHABILITATION

## 2021-08-25 PROCEDURE — 99213 OFFICE O/P EST LOW 20 MIN: CPT | Performed by: PHYSICAL MEDICINE & REHABILITATION

## 2021-08-25 PROCEDURE — G8536 NO DOC ELDER MAL SCRN: HCPCS | Performed by: PHYSICAL MEDICINE & REHABILITATION

## 2021-08-25 PROCEDURE — 1101F PT FALLS ASSESS-DOCD LE1/YR: CPT | Performed by: PHYSICAL MEDICINE & REHABILITATION

## 2021-08-25 PROCEDURE — G8432 DEP SCR NOT DOC, RNG: HCPCS | Performed by: PHYSICAL MEDICINE & REHABILITATION

## 2021-08-25 PROCEDURE — 3017F COLORECTAL CA SCREEN DOC REV: CPT | Performed by: PHYSICAL MEDICINE & REHABILITATION

## 2021-08-25 NOTE — LETTER
8/25/2021    Patient: Jessie Madrigal   YOB: 1945   Date of Visit: 8/25/2021     Magdy Burgess MD  [address]  [address] [facility] [address]  Via Fax: [phone]    Dear Magdy Burgess MD,      Thank you for referring Mr. Larissa Palomares to [address] for evaluation. My notes for this consultation are attached. If you have questions, please do not hesitate to call me. I look forward to following your patient along with you.       Sincerely,    Martita Bullard MD

## 2021-09-08 ENCOUNTER — HOSPITAL ENCOUNTER (OUTPATIENT)
Dept: GENERAL RADIOLOGY | Age: 76
Discharge: HOME | End: 2021-09-08
Attending: PHYSICIAN ASSISTANT
Payer: MEDICARE

## 2021-09-08 DIAGNOSIS — M16.11 PRIMARY OSTEOARTHRITIS OF RIGHT HIP: ICD-10-CM

## 2021-09-08 PROCEDURE — 77030018868 XR FLUORO GUIDE ASP/BX/INJ/LOC

## 2021-09-08 PROCEDURE — 74011000636 HC RX REV CODE- 636: Performed by: PHYSICIAN ASSISTANT

## 2021-09-08 PROCEDURE — 74011000250 HC RX REV CODE- 250: Performed by: PHYSICIAN ASSISTANT

## 2021-09-08 PROCEDURE — 74011250636 HC RX REV CODE- 250/636: Performed by: PHYSICIAN ASSISTANT

## 2021-09-08 RX ORDER — LIDOCAINE HYDROCHLORIDE 10 MG/ML
5 INJECTION, SOLUTION EPIDURAL; INFILTRATION; INTRACAUDAL; PERINEURAL
Status: COMPLETED | OUTPATIENT
Start: 2021-09-08 | End: 2021-09-08

## 2021-09-08 RX ORDER — METHYLPREDNISOLONE ACETATE 40 MG/ML
40 INJECTION, SUSPENSION INTRA-ARTICULAR; INTRALESIONAL; INTRAMUSCULAR; SOFT TISSUE
Status: COMPLETED | OUTPATIENT
Start: 2021-09-08 | End: 2021-09-08

## 2021-09-08 RX ADMIN — LIDOCAINE HYDROCHLORIDE 5 ML: 10 INJECTION, SOLUTION EPIDURAL; INFILTRATION; INTRACAUDAL; PERINEURAL at 09:58

## 2021-09-08 RX ADMIN — METHYLPREDNISOLONE ACETATE 40 MG: 40 INJECTION, SUSPENSION INTRA-ARTICULAR; INTRALESIONAL; INTRAMUSCULAR; INTRASYNOVIAL; SOFT TISSUE at 09:58

## 2021-09-08 RX ADMIN — IOPAMIDOL 1 ML: 408 INJECTION, SOLUTION INTRATHECAL at 09:58

## 2022-01-11 NOTE — PROGRESS NOTES
[facility]  [address], [place]   Phone: [phone]  Fax: [phone]        PROGRESS NOTE      HISTORY OF PRESENT ILLNESS:  The patient is a 78 y.o. male and was seen today for follow up of lateral right hip pain. Previously seen for low back pain that radiates into the RLE in a S1 distribution to the knee after injection of right hip x 3/2021. His pain is aggravated through activity and by lying down at night. Previously seen for low back pain, previously radiating into the right groin and RLE to the knee. Previously, he was seen for paraesthesias in between the shoulder blades radiating around into the chest. Previously, he was seen for complaints of low back pain. He denies radicular symptoms at this time. Previously, he had c/o low back pain into the BLE radiating circumferentially to the calves with symptoms consistent for stenosis. He reported an increase in pain at night. His groin pain is exacerbated by hip flexion and extention and with activities such as putting on shoes and socks. He reports a loss in ROM of his right hip. He reports lumbar epidural to the L3-L4 interlaminar space on 8/25/15 provided significant relief. Pt underwent a right hip intraarticular injection with significant benefit. Pt underwent epidural L3-4 on 9/1/2020 with significant relief. Pt underwent an epidural L3-4 on 7/27/2021 with good relief. He states he has not had any flare ups of severe pain. Pt reports he was able to reduce his Topamax dose to 50 mg qhs since 1/2017 without an increase in pain. He states low back and BLE pain has essentially resolved with rare increase in pain with activity. Pt previously underwent a left total hip replacement by Dr. Alondra Villagomez. Note from Dr. Chato Mcadams 8/7/2020 indicating patient was seen with c/o right sided hip and groin pain.  Pt has difficulty putting shoes and socks on, and pain at night. Pt has noticed more radicular pain into his RLE. 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 Indicated he may need a RT hip replacement but thought he should try a hip injection first. Ordered a L spine MRI. Preliminary reading of thoracic plain films: Limited study due to body habitus. Degenerative changes noted throughout the thoracic spine. No acute pathology identified. Preliminary reading of Pelvis plain films dated 7/2/2020 revealed: Moderately severe joint space narrowing throughout the right hip. L spine MRI dated 8/19/2020 films independently reviewed. Per report, diffuse disc bulge with superimposed left paracentral inferiorly directed extrusion at L2-L3, contributing to moderate spinal canal narrowing at the L2-L3 disc level and just below. Additional underlying degenerative disc disease and facet arthropathy also produce the following: Moderate to severe spinal canal stenosis at L4-L5. Moderate spinal canal stenosis at 3-L4 and L5-S1. 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 At his last clinic appointment, patient wished to continue his current treatment. I encouraged him to continue to perform his daily HEP.       The patient returns today with an increase in right sided low back pain into the right buttocks radiating into the RLE in a S1 to the knee x 3 weeks after prolonged walking while shopping. He rates his pain 4-10/10, previously 2-3/10. Pt reports his pain reaches 10/10 with activity. Pt states his pain has subsided since onset and is holding steady. Pt states his pain is alleviated with lying on his side, aggravated with lying flat on his back. He has treated with Ibuprofen 1000 mg daily. Pt reports he received a right hip injection in 9/2021 with good relief. He is compliant with his HEP. Pt denies change in bowel or bladder habits.  reviewed. There is no height or weight on file to calculate BMI.     PCP: Bhakti Patel, Kasie Deleon MD      Past Medical History:   Diagnosis Date    Arrhythmia     AFIB    Chronic right hip pain     Hypertension     Ill-defined condition     Graves Disease        Social History     Socioeconomic History    Marital status:      Spouse name: Not on file    Number of children: Not on file    Years of education: Not on file    Highest education level: Not on file   Occupational History    Not on file   Tobacco Use    Smoking status: Former Smoker    Smokeless tobacco: Never Used   Substance and Sexual Activity    Alcohol use: Yes    Drug use: No    Sexual activity: Not on file   Other Topics Concern    Not on file   Social History Narrative    Not on file     Social Determinants of Health     Financial Resource Strain:     Difficulty of Paying Living Expenses: Not on file   Food Insecurity:     Worried About Running Out of Food in the Last Year: Not on file    [place] of Food in the Last Year: Not on file   Transportation Needs:     Lack of Transportation (Medical): Not on file    Lack of Transportation (Non-Medical):  Not on file   Physical Activity:     Days of Exercise per Week: Not on file    Minutes of Exercise per Session: Not on file   Stress:     Feeling of Stress : Not on file   Social Connections:     Frequency of Communication with Friends and Family: Not on file    Frequency of Social Gatherings with Friends and Family: Not on file    Attends Zoroastrian Services: Not on file    Active Member of Clubs or Organizations: Not on file    Attends Club or Organization Meetings: Not on file    Marital Status: Not on file   Intimate Partner Violence:     Fear of Current or Ex-Partner: Not on file    Emotionally Abused: Not on file    Physically Abused: Not on file    Sexually Abused: Not on file   Housing Stability:     Unable to Pay for Housing in the Last Year: Not on file    Number of [place] in the Last Year: Not on file    Unstable Housing in the Last Year: Not on file       Current Outpatient Medications   Medication Sig Dispense Refill    metoprolol succinate (TOPROL-XL) 100 mg tablet Take 100 mg by mouth daily.  levothyroxine (SYNTHROID) 150 mcg tablet Take 150 mcg by mouth Daily (before breakfast).  ROSUVASTATIN CALCIUM (CRESTOR PO) Take 10 mg by mouth daily. Allergies   Allergen Reactions    Atorvastatin Myalgia    Statins-Hmg-Coa Reductase Inhibitors Myalgia    Stings [Sting, Bee] Swelling          PHYSICAL EXAMINATION    There were no vitals taken for this visit. CONSTITUTIONAL: NAD, A&O x 3  SENSATION: Decreased sensation to light touch on the RLE in a L4 and S1 distribution. Otherwise, intact to light touch throughout  RANGE OF MOTION: The patient has full passive range of motion in all four extremities. MOTOR:  Straight Leg Raise: Negative, bilateral    Ambulates with a single point cane               Hip Flex Knee Ext Knee Flex Ankle DF GTE Ankle PF Tone   Right +4/5 +4/5 +4/5 +4/5 +4/5 +4/5 +4/5   Left +4/5 +4/5 +4/5 +4/5 +4/5 +4/5 +4/5       ASSESSMENT   Diagnoses and all orders for this visit:    1. Spinal stenosis of lumbar region, unspecified whether neurogenic claudication present    2. DDD (degenerative disc disease), lumbar    3. HNP (herniated nucleus pulposus), lumbar    4. Osteoarthritis of right hip, unspecified osteoarthritis type      IMPRESSION AND PLAN:  Patient returns to the office today with c/o an increase in right sided low back pain into the right buttocks radiating into the RLE in a S1 to the knee. Multiple treatment options were discussed. Pt is interested receiving a block, I will order an epidural at L3-4. I encouraged him to continue to perform his daily HEP. Patient is neurologically intact. I will see the patient back following block or earlier if needed.     Written by Cyril Sanchez, as dictated by Winnie Olson MD  I examined the patient, reviewed and agree with the note.

## 2022-01-12 ENCOUNTER — OFFICE VISIT (OUTPATIENT)
Dept: ORTHOPEDIC SURGERY | Age: 77
End: 2022-01-12
Payer: MEDICARE

## 2022-01-12 VITALS
OXYGEN SATURATION: 94 % | WEIGHT: 310 LBS | HEART RATE: 64 BPM | BODY MASS INDEX: 41.99 KG/M2 | TEMPERATURE: 97.9 F | HEIGHT: 72 IN

## 2022-01-12 DIAGNOSIS — M48.061 SPINAL STENOSIS OF LUMBAR REGION, UNSPECIFIED WHETHER NEUROGENIC CLAUDICATION PRESENT: Primary | ICD-10-CM

## 2022-01-12 DIAGNOSIS — M51.26 HNP (HERNIATED NUCLEUS PULPOSUS), LUMBAR: ICD-10-CM

## 2022-01-12 DIAGNOSIS — M51.36 DDD (DEGENERATIVE DISC DISEASE), LUMBAR: ICD-10-CM

## 2022-01-12 DIAGNOSIS — M16.11 OSTEOARTHRITIS OF RIGHT HIP, UNSPECIFIED OSTEOARTHRITIS TYPE: ICD-10-CM

## 2022-01-12 PROCEDURE — G8432 DEP SCR NOT DOC, RNG: HCPCS | Performed by: PHYSICAL MEDICINE & REHABILITATION

## 2022-01-12 PROCEDURE — 3017F COLORECTAL CA SCREEN DOC REV: CPT | Performed by: PHYSICAL MEDICINE & REHABILITATION

## 2022-01-12 PROCEDURE — 99213 OFFICE O/P EST LOW 20 MIN: CPT | Performed by: PHYSICAL MEDICINE & REHABILITATION

## 2022-01-12 PROCEDURE — G8417 CALC BMI ABV UP PARAM F/U: HCPCS | Performed by: PHYSICAL MEDICINE & REHABILITATION

## 2022-01-12 PROCEDURE — G8427 DOCREV CUR MEDS BY ELIG CLIN: HCPCS | Performed by: PHYSICAL MEDICINE & REHABILITATION

## 2022-01-12 PROCEDURE — 1101F PT FALLS ASSESS-DOCD LE1/YR: CPT | Performed by: PHYSICAL MEDICINE & REHABILITATION

## 2022-01-12 PROCEDURE — G8536 NO DOC ELDER MAL SCRN: HCPCS | Performed by: PHYSICAL MEDICINE & REHABILITATION

## 2022-01-12 NOTE — LETTER
1/12/2022    Patient: Nell Soto   YOB: 1945   Date of Visit: 1/12/2022     Mayra Herr MD  [address]  [address] [facility] [address]  Via Fax: [phone]    Dear Mayra Herr MD,      Thank you for referring Mr. Ariel Anderson to [address] for evaluation. My notes for this consultation are attached. If you have questions, please do not hesitate to call me. I look forward to following your patient along with you.       Sincerely,    Maxine Wright MD

## 2022-02-04 ENCOUNTER — OFFICE VISIT (OUTPATIENT)
Dept: ORTHOPEDIC SURGERY | Age: 77
End: 2022-02-04
Payer: MEDICARE

## 2022-02-04 VITALS — OXYGEN SATURATION: 98 % | BODY MASS INDEX: 41.99 KG/M2 | WEIGHT: 310 LBS | HEIGHT: 72 IN | HEART RATE: 72 BPM

## 2022-02-04 DIAGNOSIS — Z96.642 HISTORY OF LEFT HIP REPLACEMENT: ICD-10-CM

## 2022-02-04 DIAGNOSIS — M25.551 RIGHT HIP PAIN: ICD-10-CM

## 2022-02-04 DIAGNOSIS — M16.11 PRIMARY OSTEOARTHRITIS OF RIGHT HIP: Primary | ICD-10-CM

## 2022-02-04 PROCEDURE — 99214 OFFICE O/P EST MOD 30 MIN: CPT | Performed by: ORTHOPAEDIC SURGERY

## 2022-02-04 PROCEDURE — G8427 DOCREV CUR MEDS BY ELIG CLIN: HCPCS | Performed by: ORTHOPAEDIC SURGERY

## 2022-02-04 PROCEDURE — 3017F COLORECTAL CA SCREEN DOC REV: CPT | Performed by: ORTHOPAEDIC SURGERY

## 2022-02-04 PROCEDURE — G8536 NO DOC ELDER MAL SCRN: HCPCS | Performed by: ORTHOPAEDIC SURGERY

## 2022-02-04 PROCEDURE — 1101F PT FALLS ASSESS-DOCD LE1/YR: CPT | Performed by: ORTHOPAEDIC SURGERY

## 2022-02-04 PROCEDURE — G8417 CALC BMI ABV UP PARAM F/U: HCPCS | Performed by: ORTHOPAEDIC SURGERY

## 2022-02-04 PROCEDURE — G8432 DEP SCR NOT DOC, RNG: HCPCS | Performed by: ORTHOPAEDIC SURGERY

## 2022-02-04 NOTE — PROGRESS NOTES
Patient: Ty Pereira                MRN: [id number]       SSN: [id number]  YOB: 1945        AGE: 78 y.o. SEX: male  Body mass index is 42.04 kg/m². PCP: Sriram Mcguire MD  02/04/22    Mr. 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     He has had a broken toe in the last year and their right hip is really driving him crazy he is using his cane full-time has had a couple episodes of giving way and and is really looking forward to having his right hip replaced the examination today very pleasant gentleman BMI is at 42 minimal peripheral edema he is a husky gentleman left hip moves very nicely right hip is very stiff with only a jog of internal rotation which reproduces groin and thigh pain does have some evidence of neuropathy especially involving L4-5 bilaterally no foot drop calf nontender Homans' sign is negative and he does have some known back issues which are separate from the the hip itself    Previous x-rays reviewed and confirmed end-stage arthritis right hip    He is looking forward to having his hip replaced    There was a discussion regarding surgery it was decided that it was definitely recommended for I think you do very well with that given his back and issues I think an overnight stay in the hospital we could consider same-day surgery I think the next day would be good for him and risk and benefits involving total hip replacement were described including but not limited to infection DVT pulmonary embolism anesthetic complications blood loss requiring transfusion dislocation leg length discrepancy limp fracture chronic pain bursitis      W OF SYSTEMS:      CON: negative  EYE: negative   ENT: negative  RESP: negative  GI:    negative   :  negative  MSK: Positive  A twelve point review of systems was completed, positives noted and all other systems were reviewed and are negative          Past Medical History:   Diagnosis Date    Arrhythmia     AFIB    Chronic right hip pain     Hypertension     Ill-defined condition     Graves Disease       Family History   Problem Relation Age of Onset    No Known Problems Mother     Diabetes Father     Diabetes Sister        Current Outpatient Medications   Medication Sig Dispense Refill    metoprolol succinate (TOPROL-XL) 100 mg tablet Take 100 mg by mouth daily.  levothyroxine (SYNTHROID) 150 mcg tablet Take 150 mcg by mouth Daily (before breakfast).  ROSUVASTATIN CALCIUM (CRESTOR PO) Take 10 mg by mouth daily. Allergies   Allergen Reactions    Atorvastatin Myalgia    Statins-Hmg-Coa Reductase Inhibitors Myalgia    Stings [Sting, Bee] Swelling       Past Surgical History:   Procedure Laterality Date    HX HERNIA REPAIR  1990    HX THYROIDECTOMY      RI SINUS SURGERY PROC UNLISTED  2011    RI TOTAL HIP ARTHROPLASTY Left 2011    by Dr. Melecio Garcia History     Socioeconomic History    Marital status:      Spouse name: Not on file    Number of children: Not on file    Years of education: Not on file    Highest education level: Not on file   Occupational History    Not on file   Tobacco Use    Smoking status: Former Smoker    Smokeless tobacco: Never Used   Substance and Sexual Activity    Alcohol use:  Yes    Drug use: No    Sexual activity: Not on file   Other Topics Concern    Not on file   Social History Narrative    Not on file     Social Determinants of Health     Financial Resource Strain:     Difficulty of Paying Living Expenses: Not on file   Food Insecurity:     Worried About [address] [employer] in the Last Year: Not on file    Ran Out of Food in the Last Year: Not on file   Transportation Needs:     Lack of Transportation (Medical): Not on file    Lack of Transportation (Non-Medical): Not on file   Physical Activity:     Days of Exercise per Week: Not on file    Minutes of Exercise per Session: Not on file   Stress:     Feeling of Stress : Not on file   Social Connections:     Frequency of Communication with Friends and Family: Not on file    Frequency of Social Gatherings with Friends and Family: Not on file    Attends Spiritism Services: Not on file    Active Member of [address] [place] [address] [employer] or Organizations: Not on file    Attends Club or Organization Meetings: Not on file    Marital Status: Not on file   Intimate Partner Violence:     Fear of Current or Ex-Partner: Not on file    Emotionally Abused: Not on file    Physically Abused: Not on file    Sexually Abused: Not on file   Housing Stability:     Unable to Pay for Housing in the Last Year: Not on file    Number of [place] in the Last Year: Not on file    Unstable Housing in the Last Year: Not on file       Visit Vitals  Pulse 72   Ht 6' (1.829 m)   Wt 140.6 kg (310 lb)   SpO2 98%   BMI 42.04 kg/m²         PHYSICAL EXAMINATION:  GENERAL: Alert and oriented x3, in no acute distress, well-developed, well-nourished, afebrile. HEART: No JVD. EYES: No scleral icterus   NECK: No significant lymphadenopathy   LUNGS: No respiratory compromise or indrawing  ABDOMEN: Soft, non-tender, non-distended. Note: This note was completed using voice recognition software. Any typographical/name errors or mistakes are unintentional.    Electronically signed by: Angelina Dolan MD          Patient: Javon Verma                MRN: [id number]       SSN: [id number]  YOB: 1945        AGE: 78 y.o. SEX: male  Body mass index is 42.04 kg/m².     PCP: Abdiaziz Staley MD  02/04/22        REVIEW OF SYSTEMS:      CON: negative  EYE: negative ENT: negative  RESP: negative  GI:    negative   :  negative  MSK: Positive  A twelve point review of systems was completed, positives noted and all other systems were reviewed and are negative          Past Medical History:   Diagnosis Date    Arrhythmia     AFIB    Chronic right hip pain     Hypertension     Ill-defined condition     Graves Disease       Family History   Problem Relation Age of Onset    No Known Problems Mother     Diabetes Father     Diabetes Sister        Current Outpatient Medications   Medication Sig Dispense Refill    metoprolol succinate (TOPROL-XL) 100 mg tablet Take 100 mg by mouth daily.  levothyroxine (SYNTHROID) 150 mcg tablet Take 150 mcg by mouth Daily (before breakfast).  ROSUVASTATIN CALCIUM (CRESTOR PO) Take 10 mg by mouth daily. Allergies   Allergen Reactions    Atorvastatin Myalgia    Statins-Hmg-Coa Reductase Inhibitors Myalgia    Stings [Sting, Bee] Swelling       Past Surgical History:   Procedure Laterality Date    HX HERNIA REPAIR  1990    HX THYROIDECTOMY      SD SINUS SURGERY PROC UNLISTED  2011    SD TOTAL HIP ARTHROPLASTY Left 2011    by Dr. Jaylene Long History     Socioeconomic History    Marital status:      Spouse name: Not on file    Number of children: Not on file    Years of education: Not on file    Highest education level: Not on file   Occupational History    Not on file   Tobacco Use    Smoking status: Former Smoker    Smokeless tobacco: Never Used   Substance and Sexual Activity    Alcohol use:  Yes    Drug use: No    Sexual activity: Not on file   Other Topics Concern    Not on file   Social History Narrative    Not on file     Social Determinants of Health     Financial Resource Strain:     Difficulty of Paying Living Expenses: Not on file   Food Insecurity:     Worried About Running Out of Food in the Last Year: Not on file    [employer] Food in the Last Year: Not on file   Transportation Needs:     Lack of Transportation (Medical): Not on file    Lack of Transportation (Non-Medical): Not on file   Physical Activity:     Days of Exercise per Week: Not on file    Minutes of Exercise per Session: Not on file   Stress:     Feeling of Stress : Not on file   Social Connections:     Frequency of Communication with Friends and Family: Not on file    Frequency of Social Gatherings with Friends and Family: Not on file    Attends Judaism Services: Not on file    Active Member of [address] [place] [address] or Organizations: Not on file    Attends Club or Organization Meetings: Not on file    Marital Status: Not on file   Intimate Partner Violence:     Fear of Current or Ex-Partner: Not on file    Emotionally Abused: Not on file    Physically Abused: Not on file    Sexually Abused: Not on file   Housing Stability:     Unable to Pay for Housing in the Last Year: Not on file    Number of [place] in the Last Year: Not on file    Unstable Housing in the Last Year: Not on file       Visit Vitals  Pulse 72   Ht 6' (1.829 m)   Wt 140.6 kg (310 lb)   SpO2 98%   BMI 42.04 kg/m²         PHYSICAL EXAMINATION:  GENERAL: Alert and oriented x3, in no acute distress, well-developed, well-nourished, afebrile. HEART: No JVD. EYES: No scleral icterus   NECK: No significant lymphadenopathy   LUNGS: No respiratory compromise or indrawing  ABDOMEN: Soft, non-tender, non-distended. Note: This note was completed using voice recognition software. Any typographical/name errors or mistakes are unintentional.    Electronically signed by:  Casi Mathew MD

## 2022-02-22 ENCOUNTER — HOSPITAL ENCOUNTER (OUTPATIENT)
Dept: GENERAL RADIOLOGY | Age: 77
Discharge: HOME | End: 2022-02-22
Attending: ORTHOPAEDIC SURGERY
Payer: MEDICARE

## 2022-02-22 DIAGNOSIS — M25.551 RIGHT HIP PAIN: ICD-10-CM

## 2022-02-22 DIAGNOSIS — M16.11 PRIMARY OSTEOARTHRITIS OF RIGHT HIP: ICD-10-CM

## 2022-02-22 PROCEDURE — 74011000250 HC RX REV CODE- 250: Performed by: ORTHOPAEDIC SURGERY

## 2022-02-22 PROCEDURE — 74011250636 HC RX REV CODE- 250/636: Performed by: ORTHOPAEDIC SURGERY

## 2022-02-22 PROCEDURE — 74011000636 HC RX REV CODE- 636: Performed by: ORTHOPAEDIC SURGERY

## 2022-02-22 PROCEDURE — 77002 NEEDLE LOCALIZATION BY XRAY: CPT

## 2022-02-22 RX ORDER — TRIAMCINOLONE ACETONIDE 40 MG/ML
40 INJECTION, SUSPENSION INTRA-ARTICULAR; INTRAMUSCULAR
Status: COMPLETED | OUTPATIENT
Start: 2022-02-22 | End: 2022-02-22

## 2022-02-22 RX ORDER — LIDOCAINE HYDROCHLORIDE 10 MG/ML
5 INJECTION, SOLUTION EPIDURAL; INFILTRATION; INTRACAUDAL; PERINEURAL
Status: COMPLETED | OUTPATIENT
Start: 2022-02-22 | End: 2022-02-22

## 2022-02-22 RX ADMIN — TRIAMCINOLONE ACETONIDE 40 MG: 40 INJECTION, SUSPENSION INTRA-ARTICULAR; INTRAMUSCULAR at 09:37

## 2022-02-22 RX ADMIN — IOHEXOL 1 ML: 240 INJECTION, SOLUTION INTRATHECAL; INTRAVASCULAR; INTRAVENOUS; ORAL at 09:37

## 2022-02-22 RX ADMIN — LIDOCAINE HYDROCHLORIDE 5 ML: 10 INJECTION, SOLUTION EPIDURAL; INFILTRATION; INTRACAUDAL; PERINEURAL at 09:37

## 2022-02-23 NOTE — PROGRESS NOTES
[facility]  [address] [place], [address]  Phone: [phone]  Fax: [phone]        PROGRESS NOTE      HISTORY OF PRESENT ILLNESS:  The patient is a 78 y.o. male and was seen today for follow up of an increase in right sided low back pain into the right buttocks radiating into the RLE in a S1 to the knee after prolonged walking while shopping. Previously seen for lateral right hip pain. Previously seen for low back pain that radiates into the RLE in a S1 distribution to the knee after injection of right hip x 3/2021. His pain is aggravated through activity and by lying down at night. Previously seen for low back pain, previously radiating into the right groin and RLE to the knee. Previously, he was seen for paraesthesias in between the shoulder blades radiating around into the chest. Previously, he was seen for complaints of low back pain. He denies radicular symptoms at this time. Previously, he had c/o low back pain into the BLE radiating circumferentially to the calves with symptoms consistent for stenosis. He reported an increase in pain at night. His groin pain is exacerbated by hip flexion and extention and with activities such as putting on shoes and socks. He reports a loss in ROM of his right hip. He reports lumbar epidural to the L3-L4 interlaminar space on 8/25/15 provided significant relief. Pt underwent a right hip intraarticular injection with significant benefit. Pt underwent epidural L3-4 on 9/1/2020 with significant relief. Pt underwent an epidural L3-4 on 7/27/2021 with good relief. Pt reports he received a right hip injection in 9/2021 with good relief. He states he has not had any flare ups of severe pain. Pt reports he was able to reduce his Topamax dose to 50 mg qhs since 1/2017 without an increase in pain. He states low back and BLE pain has essentially resolved with rare increase in pain with activity.  Pt previously underwent a left total hip replacement by Dr. Alondra Villagomez. Note from Dr. Chato Mcadams 8/7/2020 indicating patient was seen with c/o right sided hip and groin pain. Pt has difficulty putting shoes and socks on, and pain at night. Pt has noticed more radicular pain into his RLE. 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 Indicated he may need a RT hip replacement but thought he should try a hip injection first. Ordered a L spine MRI. Preliminary reading of Thoracic plain films: Limited study due to body habitus. Degenerative changes noted throughout the thoracic spine. No acute pathology identified. Preliminary reading of Pelvis plain films dated 7/2/2020 revealed: Moderately severe joint space narrowing throughout the right hip. L spine MRI dated 8/19/2020 films independently reviewed. Per report, diffuse disc bulge with superimposed left paracentral inferiorly directed extrusion at L2-L3, contributing to moderate spinal canal narrowing at the L2-L3 disc level and just below. Additional underlying degenerative disc disease and facet arthropathy also produce the following: Moderate to severe spinal canal stenosis at L4-L5. Moderate spinal canal stenosis at L3-L4 and L5-S1. 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 At his last clinic appointment, pt was interested receiving a block, I ordered an epidural at L3-4. I encouraged him to continue to perform his daily HEP.       The patient returns today pain free. He rates his pain 0/10, previously 4-10/10. Pt reports resolution of sxs following the spinal injection. He notes improvement in standing and walking intolerance. Pt underwent an epidural at L3-4 on 1/18/2022 with benefit, 100% improvement of low back pain and RLE pain. Pt reports he received a right hip injection through Dr. Alondra Villagomez on 2/15/2022 w/ relief  He is scheduled for a right hip replacement in May 2022 through Dr. Alondra Villagomez. Pt denies change in bowel or bladder habits.  reviewed. Body mass index is 42.45 kg/m². PCP: Abdiaziz Staley MD      Past Medical History:   Diagnosis Date    Arrhythmia     AFIB    Chronic right hip pain     Hypertension     Ill-defined condition     Graves Disease        Social History     Socioeconomic History    Marital status:      Spouse name: Not on file    Number of children: Not on file    Years of education: Not on file    Highest education level: Not on file   Occupational History    Not on file   Tobacco Use    Smoking status: Former Smoker    Smokeless tobacco: Never Used   Substance and Sexual Activity    Alcohol use: Yes    Drug use: No    Sexual activity: Not on file   Other Topics Concern    Not on file   Social History Narrative    Not on file     Social Determinants of Health     Financial Resource Strain:     Difficulty of Paying Living Expenses: Not on file   Food Insecurity:     Worried About Running Out of Food in the Last Year: Not on file    [place] of Food in the Last Year: Not on file   Transportation Needs:     Lack of Transportation (Medical): Not on file    Lack of Transportation (Non-Medical):  Not on file   Physical Activity:     Days of Exercise per Week: Not on file    Minutes of Exercise per Session: Not on file   Stress:     Feeling of Stress : Not on file   Social Connections:     Frequency of Communication with Friends and Family: Not on file    Frequency of Social Gatherings with Friends and Family: Not on file    Attends Ten[employer] Services: Not on file    Active Member of Clubs or Organizations: Not on file    Attends Club or Organization Meetings: Not on file    Marital Status: Not on file   Intimate Partner Violence:     Fear of Current or Ex-Partner: Not on file    Emotionally Abused: Not on file    Physically Abused: Not on file    Sexually Abused: Not on file   Housing Stability:     Unable to Pay for Housing in the Last Year: Not on file    Number of Places Lived in the Last Year: Not on file    Unstable Housing in the Last Year: Not on file       Current Outpatient Medications   Medication Sig Dispense Refill    cholecalciferol (Vitamin D3) (1000 Units /25 mcg) tablet Take 1,000 Units by mouth three (3) times daily (with meals).  metoprolol succinate (TOPROL-XL) 100 mg tablet Take 100 mg by mouth daily.  levothyroxine (SYNTHROID) 150 mcg tablet Take 150 mcg by mouth Daily (before breakfast).  ROSUVASTATIN CALCIUM (CRESTOR PO) Take 10 mg by mouth daily. Allergies   Allergen Reactions    Atorvastatin Myalgia    Statins-Hmg-Coa Reductase Inhibitors Myalgia    Stings [Sting, Bee] Swelling          PHYSICAL EXAMINATION    Visit Vitals  /66 (BP 1 Location: Right upper arm, BP Patient Position: Sitting, BP Cuff Size: Large adult)   Pulse (!) 51 Comment: pt asymptomatic, MD aware   Temp 96.8 °F (36 °C) (Temporal)   Ht 6' (1.829 m)   Wt 313 lb (142 kg)   SpO2 97% Comment: RA   BMI 42.45 kg/m²       CONSTITUTIONAL: NAD, A&O x 3  SENSATION: Intact to light touch throughout  RANGE OF MOTION: The patient has full passive range of motion in all four extremities. MOTOR:  Straight Leg Raise: Negative, bilateral    Ambulates with a single point cane               Hip Flex Knee Ext Knee Flex Ankle DF GTE Ankle PF Tone   Right +4/5 +4/5 +4/5 +4/5 +4/5 +4/5 +4/5   Left +4/5 +4/5 +4/5 +4/5 +4/5 +4/5 +4/5       ASSESSMENT   Diagnoses and all orders for this visit:    1. DDD (degenerative disc disease), lumbar    2. HNP (herniated nucleus pulposus), lumbar    3. DISH (diffuse idiopathic skeletal hyperostosis)    4. Lumbar pain    5. Right leg numbness    6. Spinal stenosis of lumbar region, unspecified whether neurogenic claudication present    7. Right hip pain      IMPRESSION AND PLAN:  Patient returns to the office today pain free. Multiple treatment options were discussed.   Pt notes 100% improvement with recent spinal injection. He should continue w/ his scheduled hip replacement through Dr. Bethanie García. Patient is neurologically intact. I will see the patient back prn. Written by Cindy Landeros, as dictated by Laura Mitchell MD  I examined the patient, reviewed and agree with the note.

## 2022-02-24 ENCOUNTER — OFFICE VISIT (OUTPATIENT)
Dept: ORTHOPEDIC SURGERY | Age: 77
End: 2022-02-24
Payer: MEDICARE

## 2022-02-24 VITALS
HEIGHT: 72 IN | BODY MASS INDEX: 42.39 KG/M2 | WEIGHT: 313 LBS | HEART RATE: 51 BPM | DIASTOLIC BLOOD PRESSURE: 66 MMHG | SYSTOLIC BLOOD PRESSURE: 129 MMHG | OXYGEN SATURATION: 97 % | TEMPERATURE: 96.8 F

## 2022-02-24 DIAGNOSIS — M48.061 SPINAL STENOSIS OF LUMBAR REGION, UNSPECIFIED WHETHER NEUROGENIC CLAUDICATION PRESENT: ICD-10-CM

## 2022-02-24 DIAGNOSIS — M51.36 DDD (DEGENERATIVE DISC DISEASE), LUMBAR: Primary | ICD-10-CM

## 2022-02-24 DIAGNOSIS — M48.10 DISH (DIFFUSE IDIOPATHIC SKELETAL HYPEROSTOSIS): ICD-10-CM

## 2022-02-24 DIAGNOSIS — M25.551 RIGHT HIP PAIN: ICD-10-CM

## 2022-02-24 DIAGNOSIS — M54.50 LUMBAR PAIN: ICD-10-CM

## 2022-02-24 DIAGNOSIS — R20.0 RIGHT LEG NUMBNESS: ICD-10-CM

## 2022-02-24 DIAGNOSIS — M51.26 HNP (HERNIATED NUCLEUS PULPOSUS), LUMBAR: ICD-10-CM

## 2022-02-24 PROCEDURE — 99213 OFFICE O/P EST LOW 20 MIN: CPT | Performed by: PHYSICAL MEDICINE & REHABILITATION

## 2022-02-24 PROCEDURE — G8432 DEP SCR NOT DOC, RNG: HCPCS | Performed by: PHYSICAL MEDICINE & REHABILITATION

## 2022-02-24 PROCEDURE — G8536 NO DOC ELDER MAL SCRN: HCPCS | Performed by: PHYSICAL MEDICINE & REHABILITATION

## 2022-02-24 PROCEDURE — G8427 DOCREV CUR MEDS BY ELIG CLIN: HCPCS | Performed by: PHYSICAL MEDICINE & REHABILITATION

## 2022-02-24 PROCEDURE — 3017F COLORECTAL CA SCREEN DOC REV: CPT | Performed by: PHYSICAL MEDICINE & REHABILITATION

## 2022-02-24 PROCEDURE — G8417 CALC BMI ABV UP PARAM F/U: HCPCS | Performed by: PHYSICAL MEDICINE & REHABILITATION

## 2022-02-24 PROCEDURE — 1101F PT FALLS ASSESS-DOCD LE1/YR: CPT | Performed by: PHYSICAL MEDICINE & REHABILITATION

## 2022-02-24 RX ORDER — MELATONIN
1000
COMMUNITY

## 2022-02-24 NOTE — LETTER
2/24/2022    Patient: Travis Penaloza   YOB: 1945   Date of Visit: 2/24/2022     Ras Romo MD  [address]  [address] [facility] [address]  Via Fax: [phone]    Dear Ras Romo MD,      Thank you for referring Mr. Jayla Viramontes to [address] [place] [address] for evaluation. My notes for this consultation are attached. If you have questions, please do not hesitate to call me. I look forward to following your patient along with you.       Sincerely,    Shayla Torres MD

## 2022-02-24 NOTE — PROGRESS NOTES
Haylee Saul presents today for   Chief Complaint   Patient presents with    Follow-up     injection follow up       Is someone accompanying this pt? no    Is the patient using any DME equipment during OV? Yes, cane    Depression Screening:  3 most recent PHQ Screens 2/4/2022   PHQ Not Done Patient refuses   Little interest or pleasure in doing things -   Feeling down, depressed, irritable, or hopeless -   Total Score PHQ 2 -       Learning Assessment:  Learning Assessment 2/24/2022   PRIMARY LEARNER Patient   PRIMARY LANGUAGE ENGLISH   LEARNER PREFERENCE PRIMARY DEMONSTRATION   ANSWERED BY patient   RELATIONSHIP SELF       Abuse Screening:  Abuse Screening Questionnaire 2/24/2022   Do you ever feel afraid of your partner? N   Are you in a relationship with someone who physically or mentally threatens you? N   Is it safe for you to go home? Y       Fall Risk  Fall Risk Assessment, last 12 mths 8/12/2021   Able to walk? Yes   Fall in past 12 months? 0   Do you feel unsteady? 0   Are you worried about falling 0   Is TUG test greater than 12 seconds? -   Is the gait abnormal? -       Coordination of Care:  1. Have you been to the ER, urgent care clinic since your last visit? no  Hospitalized since your last visit? no    2. Have you seen or consulted any other health care providers outside of the [address] [place] [address] since your last visit? Yes, ortho Include any pap smears or colon screening.  no

## 2022-03-19 PROBLEM — M48.061 SPINAL STENOSIS, LUMBAR: Status: ACTIVE | Noted: 2017-02-16

## 2022-03-19 PROBLEM — M51.369 OTHER INTERVERTEBRAL DISC DEGENERATION, LUMBAR REGION: Status: ACTIVE | Noted: 2017-08-10

## 2022-03-19 PROBLEM — M51.36 OTHER INTERVERTEBRAL DISC DEGENERATION, LUMBAR REGION: Status: ACTIVE | Noted: 2017-08-10

## 2022-03-19 PROBLEM — M48.10 DISH (DIFFUSE IDIOPATHIC SKELETAL HYPEROSTOSIS): Status: ACTIVE | Noted: 2021-05-13

## 2022-03-19 PROBLEM — E66.01 OBESITY, MORBID (HCC): Status: ACTIVE | Noted: 2019-11-18

## 2022-04-26 DIAGNOSIS — M16.11 PRIMARY OSTEOARTHRITIS OF RIGHT HIP: ICD-10-CM

## 2022-04-26 DIAGNOSIS — Z01.818 PREOPERATIVE TESTING: Primary | ICD-10-CM

## 2022-05-10 ENCOUNTER — HOSPITAL ENCOUNTER (OUTPATIENT)
Dept: PREADMISSION TESTING | Age: 77
Discharge: HOME | End: 2022-05-10
Payer: MEDICARE

## 2022-05-10 ENCOUNTER — HOSPITAL ENCOUNTER (OUTPATIENT)
Dept: GENERAL RADIOLOGY | Age: 77
Discharge: HOME | End: 2022-05-10
Payer: MEDICARE

## 2022-05-10 DIAGNOSIS — M16.11 PRIMARY OSTEOARTHRITIS OF RIGHT HIP: ICD-10-CM

## 2022-05-10 DIAGNOSIS — Z01.818 PREOPERATIVE TESTING: ICD-10-CM

## 2022-05-10 LAB
ALBUMIN SERPL-MCNC: 3.8 G/DL (ref 3.4–5)
ANION GAP SERPL CALC-SCNC: 4 MMOL/L (ref 3–18)
APPEARANCE UR: CLEAR
APTT PPP: 32 SEC (ref 23–36.4)
ATRIAL RATE: 60 BPM
BASOPHILS # BLD: 0.1 K/UL (ref 0–0.1)
BASOPHILS NFR BLD: 1 % (ref 0–2)
BILIRUB UR QL: NEGATIVE
BUN SERPL-MCNC: 15 MG/DL (ref 7–18)
BUN/CREAT SERPL: 16 (ref 12–20)
CALCIUM SERPL-MCNC: 9 MG/DL (ref 8.5–10.1)
CALCULATED P AXIS, ECG09: 26 DEGREES
CALCULATED T AXIS, ECG11: 36 DEGREES
CHLORIDE SERPL-SCNC: 107 MMOL/L (ref 100–111)
CO2 SERPL-SCNC: 29 MMOL/L (ref 21–32)
COLOR UR: YELLOW
CREAT SERPL-MCNC: 0.96 MG/DL (ref 0.6–1.3)
DIAGNOSIS, 93000: NORMAL
DIFFERENTIAL METHOD BLD: ABNORMAL
EOSINOPHIL # BLD: 0.2 K/UL (ref 0–0.4)
EOSINOPHIL NFR BLD: 3 % (ref 0–5)
ERYTHROCYTE [DISTWIDTH] IN BLOOD BY AUTOMATED COUNT: 12.8 % (ref 11.6–14.5)
EST. AVERAGE GLUCOSE BLD GHB EST-MCNC: 123 MG/DL
GLUCOSE SERPL-MCNC: 92 MG/DL (ref 74–99)
GLUCOSE UR STRIP.AUTO-MCNC: NEGATIVE MG/DL
HBA1C MFR BLD: 5.9 % (ref 4.2–5.6)
HCT VFR BLD AUTO: 45.9 % (ref 36–48)
HGB BLD-MCNC: 15.3 G/DL (ref 13–16)
HGB UR QL STRIP: NEGATIVE
IMM GRANULOCYTES # BLD AUTO: 0 K/UL (ref 0–0.04)
IMM GRANULOCYTES NFR BLD AUTO: 0 % (ref 0–0.5)
INR PPP: 1.1 (ref 0.8–1.2)
KETONES UR QL STRIP.AUTO: NEGATIVE MG/DL
LEUKOCYTE ESTERASE UR QL STRIP.AUTO: NEGATIVE
LYMPHOCYTES # BLD: 1.1 K/UL (ref 0.9–3.6)
LYMPHOCYTES NFR BLD: 19 % (ref 21–52)
MCH RBC QN AUTO: 31.1 PG (ref 24–34)
MCHC RBC AUTO-ENTMCNC: 33.3 G/DL (ref 31–37)
MCV RBC AUTO: 93.3 FL (ref 78–100)
MONOCYTES # BLD: 0.7 K/UL (ref 0.05–1.2)
MONOCYTES NFR BLD: 12 % (ref 3–10)
NEUTS SEG # BLD: 4 K/UL (ref 1.8–8)
NEUTS SEG NFR BLD: 66 % (ref 40–73)
NITRITE UR QL STRIP.AUTO: NEGATIVE
NRBC # BLD: 0 K/UL (ref 0–0.01)
NRBC BLD-RTO: 0 PER 100 WBC
P-R INTERVAL, ECG05: 180 MS
PH UR STRIP: 7 [PH] (ref 5–8)
PLATELET # BLD AUTO: 193 K/UL (ref 135–420)
PMV BLD AUTO: 9.6 FL (ref 9.2–11.8)
POTASSIUM SERPL-SCNC: 4.2 MMOL/L (ref 3.5–5.5)
PROT UR STRIP-MCNC: NEGATIVE MG/DL
PROTHROMBIN TIME: 14.4 SEC (ref 11.5–15.2)
Q-T INTERVAL, ECG07: 402 MS
QRS DURATION, ECG06: 92 MS
QTC CALCULATION (BEZET), ECG08: 402 MS
RBC # BLD AUTO: 4.92 M/UL (ref 4.35–5.65)
SODIUM SERPL-SCNC: 140 MMOL/L (ref 136–145)
SP GR UR REFRACTOMETRY: 1.01 (ref 1–1.03)
UROBILINOGEN UR QL STRIP.AUTO: 1 EU/DL (ref 0.2–1)
VENTRICULAR RATE, ECG03: 60 BPM
WBC # BLD AUTO: 6 K/UL (ref 4.6–13.2)

## 2022-05-10 PROCEDURE — 86900 BLOOD TYPING SEROLOGIC ABO: CPT

## 2022-05-10 PROCEDURE — 80048 BASIC METABOLIC PNL TOTAL CA: CPT

## 2022-05-10 PROCEDURE — 82040 ASSAY OF SERUM ALBUMIN: CPT

## 2022-05-10 PROCEDURE — 85610 PROTHROMBIN TIME: CPT

## 2022-05-10 PROCEDURE — 36415 COLL VENOUS BLD VENIPUNCTURE: CPT

## 2022-05-10 PROCEDURE — 93005 ELECTROCARDIOGRAM TRACING: CPT

## 2022-05-10 PROCEDURE — 87086 URINE CULTURE/COLONY COUNT: CPT

## 2022-05-10 PROCEDURE — 85730 THROMBOPLASTIN TIME PARTIAL: CPT

## 2022-05-10 PROCEDURE — 81003 URINALYSIS AUTO W/O SCOPE: CPT

## 2022-05-10 PROCEDURE — 71046 X-RAY EXAM CHEST 2 VIEWS: CPT

## 2022-05-10 PROCEDURE — 85025 COMPLETE CBC W/AUTO DIFF WBC: CPT

## 2022-05-10 PROCEDURE — 83036 HEMOGLOBIN GLYCOSYLATED A1C: CPT

## 2022-05-11 LAB
BACTERIA SPEC CULT: NORMAL
SERVICE CMNT-IMP: NORMAL

## 2022-05-16 DIAGNOSIS — Z01.818 ENCOUNTER FOR PREOPERATIVE EXAMINATION FOR GENERAL SURGICAL PROCEDURE: ICD-10-CM

## 2022-05-16 DIAGNOSIS — M16.11 PRIMARY OSTEOARTHRITIS OF RIGHT HIP: Primary | ICD-10-CM

## 2022-05-16 PROCEDURE — 73503 X-RAY EXAM HIP UNI 4/> VIEWS: CPT | Performed by: PHYSICIAN ASSISTANT

## 2022-05-18 ENCOUNTER — OFFICE VISIT (OUTPATIENT)
Dept: ORTHOPEDIC SURGERY | Age: 77
End: 2022-05-18
Payer: MEDICARE

## 2022-05-18 VITALS
HEART RATE: 65 BPM | WEIGHT: 304.2 LBS | OXYGEN SATURATION: 96 % | DIASTOLIC BLOOD PRESSURE: 75 MMHG | SYSTOLIC BLOOD PRESSURE: 133 MMHG | TEMPERATURE: 97.1 F | HEIGHT: 72 IN | BODY MASS INDEX: 41.2 KG/M2

## 2022-05-18 DIAGNOSIS — M16.11 PRIMARY OSTEOARTHRITIS OF RIGHT HIP: Primary | ICD-10-CM

## 2022-05-18 DIAGNOSIS — Z01.818 PRE-OPERATIVE EXAM: ICD-10-CM

## 2022-05-18 PROCEDURE — G8427 DOCREV CUR MEDS BY ELIG CLIN: HCPCS | Performed by: PHYSICIAN ASSISTANT

## 2022-05-18 PROCEDURE — G8432 DEP SCR NOT DOC, RNG: HCPCS | Performed by: PHYSICIAN ASSISTANT

## 2022-05-18 PROCEDURE — 99214 OFFICE O/P EST MOD 30 MIN: CPT | Performed by: PHYSICIAN ASSISTANT

## 2022-05-18 PROCEDURE — 1101F PT FALLS ASSESS-DOCD LE1/YR: CPT | Performed by: PHYSICIAN ASSISTANT

## 2022-05-18 PROCEDURE — G8417 CALC BMI ABV UP PARAM F/U: HCPCS | Performed by: PHYSICIAN ASSISTANT

## 2022-05-18 PROCEDURE — 3017F COLORECTAL CA SCREEN DOC REV: CPT | Performed by: PHYSICIAN ASSISTANT

## 2022-05-18 PROCEDURE — G8536 NO DOC ELDER MAL SCRN: HCPCS | Performed by: PHYSICIAN ASSISTANT

## 2022-05-18 RX ORDER — PREGABALIN 25 MG/1
50 CAPSULE ORAL ONCE
Status: CANCELLED | OUTPATIENT
Start: 2022-05-18 | End: 2022-05-18

## 2022-05-18 RX ORDER — CELECOXIB 100 MG/1
200 CAPSULE ORAL ONCE
Status: CANCELLED | OUTPATIENT
Start: 2022-05-18 | End: 2022-05-18

## 2022-05-18 RX ORDER — ACETAMINOPHEN 325 MG/1
1000 TABLET ORAL ONCE
Status: CANCELLED | OUTPATIENT
Start: 2022-05-18 | End: 2022-05-18

## 2022-05-18 RX ORDER — DEXAMETHASONE SODIUM PHOSPHATE 4 MG/ML
8 INJECTION, SOLUTION INTRA-ARTICULAR; INTRALESIONAL; INTRAMUSCULAR; INTRAVENOUS; SOFT TISSUE
Status: CANCELLED | OUTPATIENT
Start: 2022-05-18 | End: 2022-05-18

## 2022-05-18 NOTE — H&P (VIEW-ONLY)
[address] [facility], [address] [facility] [address]           HISTORY & PHYSICAL      Patient: Eran Womack                MRN: [id number]       SSN: [id number]  YOB: 1945        AGE: 78 y.o. SEX: male  Body mass index is 41.26 kg/m². PCP: Camille Diallo MD  05/18/22      CC: right hip end stage OA  Problem List Items Addressed This Visit     None      Visit Diagnoses     Primary osteoarthritis of right hip    -  Primary    Pre-operative exam                HPI:  The patient is a pleasant 78 y. o. whom has end stage OA of their Right hip and has failed conservative treatment including but not limited to NSAIDS, cortisone injections, viscosupplementation, PT, and pain medicine. Due to the current findings and affected activities of daily living, surgical intervention is indicated. The alternatives, risks, complications, as well as expected outcome were discussed. These include but are not limited to infection, blood loss, need for blood transfusion, neurovascular damage, DVT, PE,  post-op stiffness and pain, leg length discrepancy, dislocation, anesthetic complications, prothesis longevity, need for more surgery, MI, stroke, and even death. The patient understands and wishes to proceed with surgery. Past Medical History:   Diagnosis Date    Arrhythmia     AFIB    Chronic right hip pain     Hypertension     Ill-defined condition     Graves Disease         Current Outpatient Medications:     cholecalciferol (Vitamin D3) (1000 Units /25 mcg) tablet, Take 1,000 Units by mouth three (3) times daily (with meals). , Disp: , Rfl:     metoprolol succinate (TOPROL-XL) 100 mg tablet, Take 100 mg by mouth daily. , Disp: , Rfl:     levothyroxine (SYNTHROID) 150 mcg tablet, Take 150 mcg by mouth Daily (before breakfast). , Disp: , Rfl:     ROSUVASTATIN CALCIUM (CRESTOR PO), Take 10 mg by mouth daily. , Disp: , Rfl: Allergies   Allergen Reactions    Atorvastatin Myalgia    Statins-Hmg-Coa Reductase Inhibitors Myalgia    Stings [Sting, Bee] Swelling       Social History     Socioeconomic History    Marital status:      Spouse name: Not on file    Number of children: Not on file    Years of education: Not on file    Highest education level: Not on file   Occupational History    Not on file   Tobacco Use    Smoking status: Former Smoker    Smokeless tobacco: Never Used   Substance and Sexual Activity    Alcohol use: Yes    Drug use: No    Sexual activity: Not on file   Other Topics Concern    Not on file   Social History Narrative    Not on file     Social Determinants of Health     Financial Resource Strain:     Difficulty of Paying Living Expenses: Not on file   Food Insecurity:     Worried About Running Out of Food in the Last Year: Not on file    Haseeb of Food in the Last Year: Not on file   Transportation Needs:     Lack of Transportation (Medical): Not on file    Lack of Transportation (Non-Medical):  Not on file   Physical Activity:     Days of Exercise per Week: Not on file    Minutes of Exercise per Session: Not on file   Stress:     Feeling of Stress : Not on file   Social Connections:     Frequency of Communication with Friends and Family: Not on file    Frequency of Social Gatherings with Friends and Family: Not on file    Attends Restoration Services: Not on file    Active Member of [address] [place] [address] or Organizations: Not on file    Attends Club or Organization Meetings: Not on file    Marital Status: Not on file   Intimate Partner Violence:     Fear of Current or Ex-Partner: Not on file    Emotionally Abused: Not on file    Physically Abused: Not on file    Sexually Abused: Not on file   Housing Stability:     Unable to Pay for Housing in the Last Year: Not on file    Number of [place] in the Last Year: Not on file    Unstable Housing in the Last Year: Not on file       Past Surgical History:   Procedure Laterality Date    HX HERNIA REPAIR  1990    HX THYROIDECTOMY      WA SINUS SURGERY PROC UNLISTED  2011    WA TOTAL HIP ARTHROPLASTY Left 2011    by Dr. Alessia Pedraza       Family History:  Non-contributory. PE:  Visit Vitals  /75 (BP 1 Location: Left lower arm, BP Patient Position: Sitting, BP Cuff Size: Large adult)   Pulse 65   Temp 97.1 °F (36.2 °C) (Temporal)   Ht 6' (1.829 m)   Wt 304 lb 3.2 oz (138 kg)   SpO2 96%   BMI 41.26 kg/m²     A&O X3, NAD, well develop, well nourished  Heart: S1-S2, rrr  Lungs: CTA bilat  Abd: soft, nt, nt, + bs in all quadrants  Ext:  Pos distal pulses to DP, PT  Leg lengths show the right LE to be shorter grossly sitting in the chair    X-ray: Radiographs of the right hip done  5/16/2022 at the [facility] location including AP pelvis, AP and crosstable lateral the right hip as well as an AP the contralateral hip confirms end-stage arthritis of bone-on-bone eburnation. Labs: labs were reviewed and wnl.  ua neg    A:  Right  hip end stage OA    P:  At this point we will move forward with surgery. Again, the alternatives, risks, complications, as well as expected outcome were discussed and the patient wishes to proceed with surgery. Pt has been instructed to stop aspirin, nsaids, rheumatologic medications and blood thinners. They have also been instructed to continue on any heart and bp meds and to take them the morning of surgery with sips of water. Lateral approach. The patient was counseled at length about the risks of mat Covid-19 during their perioperative period and any recovery window from their procedure. The patient was made aware that mat Covid-19  may worsen their prognosis for recovering from their procedure and lend to a higher morbidity and/or mortality risk. All material risks, benefits, and reasonable alternatives including postponing the procedure were discussed.  The patient does  wish to proceed with the procedure at this time.           Adam Beverly

## 2022-05-18 NOTE — H&P
[address] [facility], [address] [facility] [address]           HISTORY & PHYSICAL      Patient: Amanda Lamb                MRN: [id number]       SSN: [id number]  YOB: 1945        AGE: 78 y.o. SEX: male  Body mass index is 41.26 kg/m². PCP: Cory House MD  05/18/22      CC: right hip end stage OA  Problem List Items Addressed This Visit     None      Visit Diagnoses     Primary osteoarthritis of right hip    -  Primary    Pre-operative exam                HPI:  The patient is a pleasant 78 y. o. whom has end stage OA of their Right hip and has failed conservative treatment including but not limited to NSAIDS, cortisone injections, viscosupplementation, PT, and pain medicine. Due to the current findings and affected activities of daily living, surgical intervention is indicated. The alternatives, risks, complications, as well as expected outcome were discussed. These include but are not limited to infection, blood loss, need for blood transfusion, neurovascular damage, DVT, PE,  post-op stiffness and pain, leg length discrepancy, dislocation, anesthetic complications, prothesis longevity, need for more surgery, MI, stroke, and even death. The patient understands and wishes to proceed with surgery. Past Medical History:   Diagnosis Date    Arrhythmia     AFIB    Chronic right hip pain     Hypertension     Ill-defined condition     Graves Disease         Current Outpatient Medications:     cholecalciferol (Vitamin D3) (1000 Units /25 mcg) tablet, Take 1,000 Units by mouth three (3) times daily (with meals). , Disp: , Rfl:     metoprolol succinate (TOPROL-XL) 100 mg tablet, Take 100 mg by mouth daily. , Disp: , Rfl:     levothyroxine (SYNTHROID) 150 mcg tablet, Take 150 mcg by mouth Daily (before breakfast). , Disp: , Rfl:     ROSUVASTATIN CALCIUM (CRESTOR PO), Take 10 mg by mouth daily. , Disp: , Rfl: Allergies   Allergen Reactions    Atorvastatin Myalgia    Statins-Hmg-Coa Reductase Inhibitors Myalgia    Stings [Sting, Bee] Swelling       Social History     Socioeconomic History    Marital status:      Spouse name: Not on file    Number of children: Not on file    Years of education: Not on file    Highest education level: Not on file   Occupational History    Not on file   Tobacco Use    Smoking status: Former Smoker    Smokeless tobacco: Never Used   Substance and Sexual Activity    Alcohol use: Yes    Drug use: No    Sexual activity: Not on file   Other Topics Concern    Not on file   Social History Narrative    Not on file     Social Determinants of Health     Financial Resource Strain:     Difficulty of Paying Living Expenses: Not on file   Food Insecurity:     Worried About Running Out of Food in the Last Year: Not on file    Haseeb of Food in the Last Year: Not on file   Transportation Needs:     Lack of Transportation (Medical): Not on file    Lack of Transportation (Non-Medical):  Not on file   Physical Activity:     Days of Exercise per Week: Not on file    Minutes of Exercise per Session: Not on file   Stress:     Feeling of Stress : Not on file   Social Connections:     Frequency of Communication with Friends and Family: Not on file    Frequency of Social Gatherings with Friends and Family: Not on file    Attends [employer] Services: Not on file    Active Member of [address] [place] [address] or Organizations: Not on file    Attends Club or Organization Meetings: Not on file    Marital Status: Not on file   Intimate Partner Violence:     Fear of Current or Ex-Partner: Not on file    Emotionally Abused: Not on file    Physically Abused: Not on file    Sexually Abused: Not on file   Housing Stability:     Unable to Pay for Housing in the Last Year: Not on file    Number of [place] in the Last Year: Not on file    Unstable Housing in the Last Year: Not on file       Past Surgical History:   Procedure Laterality Date    HX HERNIA REPAIR  1990    HX THYROIDECTOMY      MS SINUS SURGERY PROC UNLISTED  2011    MS TOTAL HIP ARTHROPLASTY Left 2011    by Dr. Kiara Juárez       Family History:  Non-contributory. PE:  Visit Vitals  /75 (BP 1 Location: Left lower arm, BP Patient Position: Sitting, BP Cuff Size: Large adult)   Pulse 65   Temp 97.1 °F (36.2 °C) (Temporal)   Ht 6' (1.829 m)   Wt 304 lb 3.2 oz (138 kg)   SpO2 96%   BMI 41.26 kg/m²     A&O X3, NAD, well develop, well nourished  Heart: S1-S2, rrr  Lungs: CTA bilat  Abd: soft, nt, nt, + bs in all quadrants  Ext:  Pos distal pulses to DP, PT  Leg lengths show the right LE to be shorter grossly sitting in the chair    X-ray: Radiographs of the right hip done  5/16/2022 at the [facility] location including AP pelvis, AP and crosstable lateral the right hip as well as an AP the contralateral hip confirms end-stage arthritis of bone-on-bone eburnation. Labs: labs were reviewed and wnl.  ua neg    A:  Right  hip end stage OA    P:  At this point we will move forward with surgery. Again, the alternatives, risks, complications, as well as expected outcome were discussed and the patient wishes to proceed with surgery. Pt has been instructed to stop aspirin, nsaids, rheumatologic medications and blood thinners. They have also been instructed to continue on any heart and bp meds and to take them the morning of surgery with sips of water. Lateral approach. The patient was counseled at length about the risks of mat Covid-19 during their perioperative period and any recovery window from their procedure. The patient was made aware that mat Covid-19  may worsen their prognosis for recovering from their procedure and lend to a higher morbidity and/or mortality risk. All material risks, benefits, and reasonable alternatives including postponing the procedure were discussed.  The patient does  wish to proceed with the procedure at this time.           Leatha Mazariegos

## 2022-05-19 NOTE — PERIOP NOTES
PRE-SURGICAL INSTRUCTIONS        Patient's Name:  Dontae Huerta      TXYNKNoramX Date:  5/19/2022            Covid Testing Date and Time:    Surgery Date:  5/24/2022                1. Do NOT eat or drink anything, including candy, gum, or ice chips after midnight on 05/23, unless you have specific instructions from your surgeon or anesthesia provider to do so.  2. You may brush your teeth before coming to the hospital.  3. No smoking 24 hours prior to the day of surgery. 4. No alcohol 24 hours prior to the day of surgery. 5. No recreational drugs for one week prior to the day of surgery. 6. Leave all valuables, including money/purse, at home. 7. Remove all jewelry, nail polish, acrylic nails, and makeup (including mascara); no lotions powders, deodorant, or perfume/cologne/after shave on the skin. 8. Follow instruction for Hibiclens washes and CHG wipes from surgeon's office. 9. Glasses/contact lenses and dentures may be worn to the hospital.  They will be removed prior to surgery. 10. Call your doctor if symptoms of a cold or illness develop within 24-48 hours prior to your surgery. 11.  If you are having an outpatient procedure, please make arrangements for a responsible ADULT TO [address] [place] [address] and stay with you for 24 hours after your surgery. 12. ONE VISITOR in the hospital at this time for outpatient procedures. Exceptions may be made for surgical admissions, per nursing unit guidelines      Special Instructions:      Bring list of CURRENT medications. Bring any pertinent legal medical records. Take these medications the morning of surgery with a sip of water:  NONE      On the day of surgery, come in the main entrance of [facility]. Let the  at the desk know you are there for surgery. A staff member will come escort you to the surgical area on the second floor.     If you have any questions or concerns, please do not hesitate to call:     (Prior to the day of surgery) [facility] department:  [phone]   (Day of surgery) Pre-Op department:  [phone]    These surgical instructions were reviewed with Waqar Guzman during the [facility] phone call.

## 2022-05-23 ENCOUNTER — ANESTHESIA EVENT (OUTPATIENT)
Dept: SURGERY | Age: 77
End: 2022-05-23
Payer: MEDICARE

## 2022-05-24 ENCOUNTER — HOSPITAL ENCOUNTER (OUTPATIENT)
Age: 77
Setting detail: OBSERVATION
Discharge: HOME HEALTH | End: 2022-05-25
Attending: ORTHOPAEDIC SURGERY | Admitting: ORTHOPAEDIC SURGERY
Payer: MEDICARE

## 2022-05-24 ENCOUNTER — APPOINTMENT (OUTPATIENT)
Dept: GENERAL RADIOLOGY | Age: 77
End: 2022-05-24
Attending: ORTHOPAEDIC SURGERY
Payer: MEDICARE

## 2022-05-24 ENCOUNTER — ANESTHESIA (OUTPATIENT)
Dept: SURGERY | Age: 77
End: 2022-05-24
Payer: MEDICARE

## 2022-05-24 DIAGNOSIS — M16.10 HIP ARTHRITIS: Primary | ICD-10-CM

## 2022-05-24 DIAGNOSIS — E66.01 OBESITY, MORBID (HCC): ICD-10-CM

## 2022-05-24 LAB
ABO + RH BLD: NORMAL
BLOOD GROUP ANTIBODIES SERPL: NORMAL
SPECIMEN EXP DATE BLD: NORMAL

## 2022-05-24 PROCEDURE — 77030008683 HC TU ET CUF COVD -A: Performed by: ANESTHESIOLOGY

## 2022-05-24 PROCEDURE — 77030018835 HC SOL IRR LR ICUM -A: Performed by: ORTHOPAEDIC SURGERY

## 2022-05-24 PROCEDURE — 27130 TOTAL HIP ARTHROPLASTY: CPT | Performed by: PHYSICIAN ASSISTANT

## 2022-05-24 PROCEDURE — 76060000035 HC ANESTHESIA 2 TO 2.5 HR: Performed by: ORTHOPAEDIC SURGERY

## 2022-05-24 PROCEDURE — 74011250637 HC RX REV CODE- 250/637: Performed by: PHYSICIAN ASSISTANT

## 2022-05-24 PROCEDURE — 74011000250 HC RX REV CODE- 250: Performed by: NURSE ANESTHETIST, CERTIFIED REGISTERED

## 2022-05-24 PROCEDURE — 27130 TOTAL HIP ARTHROPLASTY: CPT | Performed by: ORTHOPAEDIC SURGERY

## 2022-05-24 PROCEDURE — 77030040241 HC ABD PLLW HIP MDII -B: Performed by: ORTHOPAEDIC SURGERY

## 2022-05-24 PROCEDURE — 77030012935 HC DRSG AQUACEL BMS -B: Performed by: ORTHOPAEDIC SURGERY

## 2022-05-24 PROCEDURE — 77030012890: Performed by: ORTHOPAEDIC SURGERY

## 2022-05-24 PROCEDURE — 74011000258 HC RX REV CODE- 258: Performed by: ORTHOPAEDIC SURGERY

## 2022-05-24 PROCEDURE — 2709999900 HC NON-CHARGEABLE SUPPLY

## 2022-05-24 PROCEDURE — 76010000131 HC OR TIME 2 TO 2.5 HR: Performed by: ORTHOPAEDIC SURGERY

## 2022-05-24 PROCEDURE — C1713 ANCHOR/SCREW BN/BN,TIS/BN: HCPCS | Performed by: ORTHOPAEDIC SURGERY

## 2022-05-24 PROCEDURE — 77030031139 HC SUT VCRL2 J&J -A: Performed by: ORTHOPAEDIC SURGERY

## 2022-05-24 PROCEDURE — 76942 ECHO GUIDE FOR BIOPSY: CPT | Performed by: ANESTHESIOLOGY

## 2022-05-24 PROCEDURE — 77030019557 HC ELECTRD VES SEAL MEDT -F: Performed by: ORTHOPAEDIC SURGERY

## 2022-05-24 PROCEDURE — 77030003602 HC NDL NRV BLK BBMI -B: Performed by: ORTHOPAEDIC SURGERY

## 2022-05-24 PROCEDURE — 77030008462 HC STPLR SKN PROX J&J -A: Performed by: ORTHOPAEDIC SURGERY

## 2022-05-24 PROCEDURE — 74011000250 HC RX REV CODE- 250: Performed by: ORTHOPAEDIC SURGERY

## 2022-05-24 PROCEDURE — 74011250637 HC RX REV CODE- 250/637: Performed by: ORTHOPAEDIC SURGERY

## 2022-05-24 PROCEDURE — C1776 JOINT DEVICE (IMPLANTABLE): HCPCS | Performed by: ORTHOPAEDIC SURGERY

## 2022-05-24 PROCEDURE — 88311 DECALCIFY TISSUE: CPT

## 2022-05-24 PROCEDURE — 86900 BLOOD TYPING SEROLOGIC ABO: CPT

## 2022-05-24 PROCEDURE — 64449 NJX AA&/STRD LMBR PLEX NFS: CPT | Performed by: ANESTHESIOLOGY

## 2022-05-24 PROCEDURE — G0378 HOSPITAL OBSERVATION PER HR: HCPCS

## 2022-05-24 PROCEDURE — 64520 N BLOCK LUMBAR/THORACIC: CPT | Performed by: ANESTHESIOLOGY

## 2022-05-24 PROCEDURE — 74011250636 HC RX REV CODE- 250/636: Performed by: NURSE ANESTHETIST, CERTIFIED REGISTERED

## 2022-05-24 PROCEDURE — 01214 ANES OPEN PX TOT HIP ARTHRP: CPT | Performed by: NURSE ANESTHETIST, CERTIFIED REGISTERED

## 2022-05-24 PROCEDURE — 77030027138 HC INCENT SPIROMETER -A: Performed by: ORTHOPAEDIC SURGERY

## 2022-05-24 PROCEDURE — 2709999900 HC NON-CHARGEABLE SUPPLY: Performed by: ORTHOPAEDIC SURGERY

## 2022-05-24 PROCEDURE — 77030006835 HC BLD SAW SAG STRY -B: Performed by: ORTHOPAEDIC SURGERY

## 2022-05-24 PROCEDURE — 99100 ANES PT EXTEME AGE<1 YR&>70: CPT | Performed by: ANESTHESIOLOGY

## 2022-05-24 PROCEDURE — 73502 X-RAY EXAM HIP UNI 2-3 VIEWS: CPT

## 2022-05-24 PROCEDURE — 74011000272 HC RX REV CODE- 272: Performed by: ORTHOPAEDIC SURGERY

## 2022-05-24 PROCEDURE — 77030002933 HC SUT MCRYL J&J -A: Performed by: ORTHOPAEDIC SURGERY

## 2022-05-24 PROCEDURE — 74011250636 HC RX REV CODE- 250/636: Performed by: ANESTHESIOLOGY

## 2022-05-24 PROCEDURE — 88304 TISSUE EXAM BY PATHOLOGIST: CPT

## 2022-05-24 PROCEDURE — 74011000258 HC RX REV CODE- 258: Performed by: NURSE ANESTHETIST, CERTIFIED REGISTERED

## 2022-05-24 PROCEDURE — 01214 ANES OPEN PX TOT HIP ARTHRP: CPT | Performed by: ANESTHESIOLOGY

## 2022-05-24 PROCEDURE — 99100 ANES PT EXTEME AGE<1 YR&>70: CPT | Performed by: NURSE ANESTHETIST, CERTIFIED REGISTERED

## 2022-05-24 PROCEDURE — 77030013079 HC BLNKT BAIR HGGR 3M -A: Performed by: ANESTHESIOLOGY

## 2022-05-24 PROCEDURE — 77030018836 HC SOL IRR NACL ICUM -A: Performed by: ORTHOPAEDIC SURGERY

## 2022-05-24 PROCEDURE — 74011250636 HC RX REV CODE- 250/636: Performed by: ORTHOPAEDIC SURGERY

## 2022-05-24 PROCEDURE — 77030038692 HC WND DEB SYS IRMX -B: Performed by: ORTHOPAEDIC SURGERY

## 2022-05-24 PROCEDURE — 76210000017 HC OR PH I REC 1.5 TO 2 HR: Performed by: ORTHOPAEDIC SURGERY

## 2022-05-24 PROCEDURE — C9290 INJ, BUPIVACAINE LIPOSOME: HCPCS | Performed by: ORTHOPAEDIC SURGERY

## 2022-05-24 PROCEDURE — 77030013708 HC HNDPC SUC IRR PULS STRY –B: Performed by: ORTHOPAEDIC SURGERY

## 2022-05-24 PROCEDURE — 74011250637 HC RX REV CODE- 250/637: Performed by: NURSE ANESTHETIST, CERTIFIED REGISTERED

## 2022-05-24 PROCEDURE — 77030040361 HC SLV COMPR DVT MDII -B: Performed by: ORTHOPAEDIC SURGERY

## 2022-05-24 PROCEDURE — 77030040922 HC BLNKT HYPOTHRM STRY -A: Performed by: ORTHOPAEDIC SURGERY

## 2022-05-24 PROCEDURE — 77030003029 HC SUT VCRL J&J -B: Performed by: ORTHOPAEDIC SURGERY

## 2022-05-24 PROCEDURE — 77030018842 HC SOL IRR SOD CL 9% BAXT -A

## 2022-05-24 DEVICE — SCREW BNE L20MM DIA65MM LO PROF HEX TRIDENT LL: Type: IMPLANTABLE DEVICE | Site: HIP | Status: FUNCTIONAL

## 2022-05-24 DEVICE — STEM FEM SZ 5 127D 35X108X44MM -- ACCOLADE II V40: Type: IMPLANTABLE DEVICE | Site: HIP | Status: FUNCTIONAL

## 2022-05-24 DEVICE — HEAD FEM DELT V40 +0MM NK 36MM -- V40 BIOLOX: Type: IMPLANTABLE DEVICE | Site: HIP | Status: FUNCTIONAL

## 2022-05-24 DEVICE — INSERT ACET ECC E 0 DEG 36 MM HIP X3 TRIDENT: Type: IMPLANTABLE DEVICE | Site: HIP | Status: FUNCTIONAL

## 2022-05-24 DEVICE — COMPONENT TOT HIP CAPPED LNR POLYETH [H2STRYKER] [STRYKER CORP]: Type: IMPLANTABLE DEVICE | Site: HIP | Status: FUNCTIONAL

## 2022-05-24 DEVICE — SHELL ACET CLUS H 54E TRTANIUM -- TRIDENT II: Type: IMPLANTABLE DEVICE | Site: HIP | Status: FUNCTIONAL

## 2022-05-24 DEVICE — Z DUP USE 2377968 SCREW ACET HEX LOW PROFILE 6.5X25MM TRIDENT II: Type: IMPLANTABLE DEVICE | Site: HIP | Status: FUNCTIONAL

## 2022-05-24 RX ORDER — SODIUM CHLORIDE, SODIUM LACTATE, POTASSIUM CHLORIDE, CALCIUM CHLORIDE 600; 310; 30; 20 MG/100ML; MG/100ML; MG/100ML; MG/100ML
25 INJECTION, SOLUTION INTRAVENOUS CONTINUOUS
Status: DISCONTINUED | OUTPATIENT
Start: 2022-05-24 | End: 2022-05-24 | Stop reason: HOSPADM

## 2022-05-24 RX ORDER — SODIUM CHLORIDE 0.9 % (FLUSH) 0.9 %
5-40 SYRINGE (ML) INJECTION EVERY 8 HOURS
Status: DISCONTINUED | OUTPATIENT
Start: 2022-05-24 | End: 2022-05-24 | Stop reason: HOSPADM

## 2022-05-24 RX ORDER — CEFADROXIL 500 MG/1
500 CAPSULE ORAL 2 TIMES DAILY
Qty: 14 CAPSULE | Refills: 0 | Status: SHIPPED | OUTPATIENT
Start: 2022-05-24 | End: 2022-06-30

## 2022-05-24 RX ORDER — SODIUM CHLORIDE 0.9 % (FLUSH) 0.9 %
5-40 SYRINGE (ML) INJECTION AS NEEDED
Status: DISCONTINUED | OUTPATIENT
Start: 2022-05-24 | End: 2022-05-25 | Stop reason: HOSPADM

## 2022-05-24 RX ORDER — DEXAMETHASONE SODIUM PHOSPHATE 4 MG/ML
8 INJECTION, SOLUTION INTRA-ARTICULAR; INTRALESIONAL; INTRAMUSCULAR; INTRAVENOUS; SOFT TISSUE
Status: DISCONTINUED | OUTPATIENT
Start: 2022-05-24 | End: 2022-05-24 | Stop reason: HOSPADM

## 2022-05-24 RX ORDER — KETOROLAC TROMETHAMINE 15 MG/ML
15 INJECTION, SOLUTION INTRAMUSCULAR; INTRAVENOUS EVERY 6 HOURS
Status: DISCONTINUED | OUTPATIENT
Start: 2022-05-24 | End: 2022-05-25 | Stop reason: HOSPADM

## 2022-05-24 RX ORDER — AMOXICILLIN 250 MG
1 CAPSULE ORAL 2 TIMES DAILY
Status: DISCONTINUED | OUTPATIENT
Start: 2022-05-24 | End: 2022-05-25 | Stop reason: HOSPADM

## 2022-05-24 RX ORDER — OXYCODONE HYDROCHLORIDE 5 MG/1
10-15 TABLET ORAL
Status: DISCONTINUED | OUTPATIENT
Start: 2022-05-24 | End: 2022-05-25 | Stop reason: HOSPADM

## 2022-05-24 RX ORDER — MIDAZOLAM HYDROCHLORIDE 1 MG/ML
2 INJECTION, SOLUTION INTRAMUSCULAR; INTRAVENOUS ONCE
Status: COMPLETED | OUTPATIENT
Start: 2022-05-24 | End: 2022-05-24

## 2022-05-24 RX ORDER — CELECOXIB 100 MG/1
200 CAPSULE ORAL 2 TIMES DAILY
Status: DISCONTINUED | OUTPATIENT
Start: 2022-05-24 | End: 2022-05-25 | Stop reason: HOSPADM

## 2022-05-24 RX ORDER — GLYCOPYRROLATE 0.2 MG/ML
INJECTION INTRAMUSCULAR; INTRAVENOUS AS NEEDED
Status: DISCONTINUED | OUTPATIENT
Start: 2022-05-24 | End: 2022-05-24 | Stop reason: HOSPADM

## 2022-05-24 RX ORDER — ROCURONIUM BROMIDE 10 MG/ML
INJECTION, SOLUTION INTRAVENOUS AS NEEDED
Status: DISCONTINUED | OUTPATIENT
Start: 2022-05-24 | End: 2022-05-24 | Stop reason: HOSPADM

## 2022-05-24 RX ORDER — ONDANSETRON 4 MG/1
4 TABLET, FILM COATED ORAL
Qty: 30 TABLET | Refills: 1 | Status: SHIPPED | OUTPATIENT
Start: 2022-05-24 | End: 2022-06-30

## 2022-05-24 RX ORDER — METOPROLOL TARTRATE 5 MG/5ML
INJECTION INTRAVENOUS AS NEEDED
Status: DISCONTINUED | OUTPATIENT
Start: 2022-05-24 | End: 2022-05-24 | Stop reason: HOSPADM

## 2022-05-24 RX ORDER — SODIUM CHLORIDE 9 MG/ML
100 INJECTION, SOLUTION INTRAVENOUS CONTINUOUS
Status: DISCONTINUED | OUTPATIENT
Start: 2022-05-24 | End: 2022-05-25 | Stop reason: HOSPADM

## 2022-05-24 RX ORDER — LANOLIN ALCOHOL/MO/W.PET/CERES
1 CREAM (GRAM) TOPICAL 2 TIMES DAILY WITH MEALS
Status: DISCONTINUED | OUTPATIENT
Start: 2022-05-24 | End: 2022-05-25 | Stop reason: HOSPADM

## 2022-05-24 RX ORDER — LIDOCAINE HYDROCHLORIDE 10 MG/ML
0.1 INJECTION, SOLUTION EPIDURAL; INFILTRATION; INTRACAUDAL; PERINEURAL AS NEEDED
Status: DISCONTINUED | OUTPATIENT
Start: 2022-05-24 | End: 2022-05-24 | Stop reason: HOSPADM

## 2022-05-24 RX ORDER — ACETAMINOPHEN 500 MG
1000 TABLET ORAL EVERY 6 HOURS
Status: DISCONTINUED | OUTPATIENT
Start: 2022-05-24 | End: 2022-05-25 | Stop reason: HOSPADM

## 2022-05-24 RX ORDER — SUCCINYLCHOLINE CHLORIDE 20 MG/ML
INJECTION INTRAMUSCULAR; INTRAVENOUS AS NEEDED
Status: DISCONTINUED | OUTPATIENT
Start: 2022-05-24 | End: 2022-05-24 | Stop reason: HOSPADM

## 2022-05-24 RX ORDER — MIDAZOLAM HYDROCHLORIDE 1 MG/ML
INJECTION, SOLUTION INTRAMUSCULAR; INTRAVENOUS
Status: COMPLETED | OUTPATIENT
Start: 2022-05-24 | End: 2022-05-24

## 2022-05-24 RX ORDER — PHENYLEPHRINE HCL IN 0.9% NACL 1 MG/10 ML
SYRINGE (ML) INTRAVENOUS AS NEEDED
Status: DISCONTINUED | OUTPATIENT
Start: 2022-05-24 | End: 2022-05-24 | Stop reason: HOSPADM

## 2022-05-24 RX ORDER — ASPIRIN 81 MG/1
81 TABLET ORAL 2 TIMES DAILY
Status: DISCONTINUED | OUTPATIENT
Start: 2022-05-25 | End: 2022-05-25 | Stop reason: HOSPADM

## 2022-05-24 RX ORDER — DOCUSATE SODIUM 100 MG/1
100 CAPSULE, LIQUID FILLED ORAL 2 TIMES DAILY
Qty: 60 CAPSULE | Refills: 2 | Status: SHIPPED | OUTPATIENT
Start: 2022-05-24 | End: 2022-06-30

## 2022-05-24 RX ORDER — LIDOCAINE HYDROCHLORIDE 20 MG/ML
INJECTION, SOLUTION EPIDURAL; INFILTRATION; INTRACAUDAL; PERINEURAL AS NEEDED
Status: DISCONTINUED | OUTPATIENT
Start: 2022-05-24 | End: 2022-05-24 | Stop reason: HOSPADM

## 2022-05-24 RX ORDER — ACETAMINOPHEN 500 MG
1000 TABLET ORAL ONCE
Status: COMPLETED | OUTPATIENT
Start: 2022-05-24 | End: 2022-05-24

## 2022-05-24 RX ORDER — SODIUM CHLORIDE, SODIUM LACTATE, POTASSIUM CHLORIDE, CALCIUM CHLORIDE 600; 310; 30; 20 MG/100ML; MG/100ML; MG/100ML; MG/100ML
100 INJECTION, SOLUTION INTRAVENOUS CONTINUOUS
Status: DISCONTINUED | OUTPATIENT
Start: 2022-05-24 | End: 2022-05-24 | Stop reason: HOSPADM

## 2022-05-24 RX ORDER — FENTANYL CITRATE 50 UG/ML
100 INJECTION, SOLUTION INTRAMUSCULAR; INTRAVENOUS ONCE
Status: COMPLETED | OUTPATIENT
Start: 2022-05-24 | End: 2022-05-24

## 2022-05-24 RX ORDER — ALBUTEROL SULFATE 0.83 MG/ML
2.5 SOLUTION RESPIRATORY (INHALATION) AS NEEDED
Status: DISCONTINUED | OUTPATIENT
Start: 2022-05-24 | End: 2022-05-24 | Stop reason: HOSPADM

## 2022-05-24 RX ORDER — ONDANSETRON 2 MG/ML
INJECTION INTRAMUSCULAR; INTRAVENOUS AS NEEDED
Status: DISCONTINUED | OUTPATIENT
Start: 2022-05-24 | End: 2022-05-24 | Stop reason: HOSPADM

## 2022-05-24 RX ORDER — HYDRALAZINE HYDROCHLORIDE 20 MG/ML
INJECTION INTRAMUSCULAR; INTRAVENOUS AS NEEDED
Status: DISCONTINUED | OUTPATIENT
Start: 2022-05-24 | End: 2022-05-24 | Stop reason: HOSPADM

## 2022-05-24 RX ORDER — LEVOTHYROXINE SODIUM 75 UG/1
150 TABLET ORAL
Status: DISCONTINUED | OUTPATIENT
Start: 2022-05-24 | End: 2022-05-25 | Stop reason: HOSPADM

## 2022-05-24 RX ORDER — METOPROLOL SUCCINATE 50 MG/1
100 TABLET, EXTENDED RELEASE ORAL
Status: DISCONTINUED | OUTPATIENT
Start: 2022-05-24 | End: 2022-05-25 | Stop reason: HOSPADM

## 2022-05-24 RX ORDER — OXYCODONE AND ACETAMINOPHEN 10; 325 MG/1; MG/1
1-2 TABLET ORAL
Qty: 56 TABLET | Refills: 0 | Status: SHIPPED | OUTPATIENT
Start: 2022-05-24 | End: 2022-05-31

## 2022-05-24 RX ORDER — FAMOTIDINE 20 MG/1
20 TABLET, FILM COATED ORAL ONCE
Status: COMPLETED | OUTPATIENT
Start: 2022-05-24 | End: 2022-05-24

## 2022-05-24 RX ORDER — DEXAMETHASONE SODIUM PHOSPHATE 4 MG/ML
INJECTION, SOLUTION INTRA-ARTICULAR; INTRALESIONAL; INTRAMUSCULAR; INTRAVENOUS; SOFT TISSUE AS NEEDED
Status: DISCONTINUED | OUTPATIENT
Start: 2022-05-24 | End: 2022-05-24 | Stop reason: HOSPADM

## 2022-05-24 RX ORDER — PROPOFOL 10 MG/ML
INJECTION, EMULSION INTRAVENOUS AS NEEDED
Status: DISCONTINUED | OUTPATIENT
Start: 2022-05-24 | End: 2022-05-24 | Stop reason: HOSPADM

## 2022-05-24 RX ORDER — PREGABALIN 25 MG/1
25 CAPSULE ORAL 2 TIMES DAILY
Status: DISCONTINUED | OUTPATIENT
Start: 2022-05-24 | End: 2022-05-25 | Stop reason: HOSPADM

## 2022-05-24 RX ORDER — DIPHENHYDRAMINE HYDROCHLORIDE 50 MG/ML
12.5 INJECTION, SOLUTION INTRAMUSCULAR; INTRAVENOUS
Status: DISCONTINUED | OUTPATIENT
Start: 2022-05-24 | End: 2022-05-24 | Stop reason: HOSPADM

## 2022-05-24 RX ORDER — EPHEDRINE SULFATE/0.9% NACL/PF 25 MG/5 ML
SYRINGE (ML) INTRAVENOUS AS NEEDED
Status: DISCONTINUED | OUTPATIENT
Start: 2022-05-24 | End: 2022-05-24 | Stop reason: HOSPADM

## 2022-05-24 RX ORDER — FENTANYL CITRATE 50 UG/ML
INJECTION, SOLUTION INTRAMUSCULAR; INTRAVENOUS
Status: COMPLETED | OUTPATIENT
Start: 2022-05-24 | End: 2022-05-24

## 2022-05-24 RX ORDER — FENTANYL CITRATE 50 UG/ML
25 INJECTION, SOLUTION INTRAMUSCULAR; INTRAVENOUS
Status: DISCONTINUED | OUTPATIENT
Start: 2022-05-24 | End: 2022-05-24 | Stop reason: HOSPADM

## 2022-05-24 RX ORDER — SODIUM CHLORIDE 0.9 % (FLUSH) 0.9 %
5-40 SYRINGE (ML) INJECTION AS NEEDED
Status: DISCONTINUED | OUTPATIENT
Start: 2022-05-24 | End: 2022-05-24 | Stop reason: HOSPADM

## 2022-05-24 RX ORDER — ROPIVACAINE HYDROCHLORIDE 2 MG/ML
30 INJECTION, SOLUTION EPIDURAL; INFILTRATION; PERINEURAL
Status: COMPLETED | OUTPATIENT
Start: 2022-05-24 | End: 2022-05-24

## 2022-05-24 RX ORDER — FLUMAZENIL 0.1 MG/ML
0.2 INJECTION INTRAVENOUS AS NEEDED
Status: DISCONTINUED | OUTPATIENT
Start: 2022-05-24 | End: 2022-05-25 | Stop reason: HOSPADM

## 2022-05-24 RX ORDER — NALOXONE HYDROCHLORIDE 0.4 MG/ML
0.4 INJECTION, SOLUTION INTRAMUSCULAR; INTRAVENOUS; SUBCUTANEOUS AS NEEDED
Status: DISCONTINUED | OUTPATIENT
Start: 2022-05-24 | End: 2022-05-25 | Stop reason: HOSPADM

## 2022-05-24 RX ORDER — SODIUM CHLORIDE 0.9 % (FLUSH) 0.9 %
5-40 SYRINGE (ML) INJECTION EVERY 8 HOURS
Status: DISCONTINUED | OUTPATIENT
Start: 2022-05-24 | End: 2022-05-25 | Stop reason: HOSPADM

## 2022-05-24 RX ORDER — PROCHLORPERAZINE EDISYLATE 5 MG/ML
5 INJECTION INTRAMUSCULAR; INTRAVENOUS ONCE
Status: DISCONTINUED | OUTPATIENT
Start: 2022-05-24 | End: 2022-05-24 | Stop reason: HOSPADM

## 2022-05-24 RX ORDER — ROPIVACAINE HYDROCHLORIDE 2 MG/ML
INJECTION, SOLUTION EPIDURAL; INFILTRATION; PERINEURAL
Status: COMPLETED | OUTPATIENT
Start: 2022-05-24 | End: 2022-05-24

## 2022-05-24 RX ORDER — HYDROMORPHONE HYDROCHLORIDE 1 MG/ML
0.5 INJECTION, SOLUTION INTRAMUSCULAR; INTRAVENOUS; SUBCUTANEOUS
Status: DISCONTINUED | OUTPATIENT
Start: 2022-05-24 | End: 2022-05-24 | Stop reason: HOSPADM

## 2022-05-24 RX ORDER — CELECOXIB 100 MG/1
200 CAPSULE ORAL ONCE
Status: COMPLETED | OUTPATIENT
Start: 2022-05-24 | End: 2022-05-24

## 2022-05-24 RX ORDER — CELECOXIB 200 MG/1
200 CAPSULE ORAL 2 TIMES DAILY
Qty: 60 CAPSULE | Refills: 2 | Status: SHIPPED | OUTPATIENT
Start: 2022-05-24 | End: 2022-08-22

## 2022-05-24 RX ORDER — PREGABALIN 50 MG/1
50 CAPSULE ORAL ONCE
Status: COMPLETED | OUTPATIENT
Start: 2022-05-24 | End: 2022-05-24

## 2022-05-24 RX ORDER — ONDANSETRON 2 MG/ML
4 INJECTION INTRAMUSCULAR; INTRAVENOUS
Status: DISCONTINUED | OUTPATIENT
Start: 2022-05-24 | End: 2022-05-25 | Stop reason: HOSPADM

## 2022-05-24 RX ORDER — ASPIRIN 81 MG/1
81 TABLET ORAL 2 TIMES DAILY
Qty: 60 TABLET | Refills: 1 | Status: SHIPPED | OUTPATIENT
Start: 2022-05-24 | End: 2022-06-30

## 2022-05-24 RX ADMIN — ROCURONIUM BROMIDE 10 MG: 50 INJECTION INTRAVENOUS at 14:00

## 2022-05-24 RX ADMIN — TRANEXAMIC ACID 1 G: 100 INJECTION, SOLUTION INTRAVENOUS at 14:36

## 2022-05-24 RX ADMIN — LIDOCAINE HYDROCHLORIDE 2 ML: 10 INJECTION, SOLUTION EPIDURAL; INFILTRATION; INTRACAUDAL; PERINEURAL at 12:29

## 2022-05-24 RX ADMIN — CELECOXIB 200 MG: 100 CAPSULE ORAL at 11:57

## 2022-05-24 RX ADMIN — SODIUM CHLORIDE, PRESERVATIVE FREE 10 ML: 5 INJECTION INTRAVENOUS at 22:00

## 2022-05-24 RX ADMIN — TRANEXAMIC ACID 1 G: 100 INJECTION, SOLUTION INTRAVENOUS at 13:20

## 2022-05-24 RX ADMIN — ACETAMINOPHEN 1000 MG: 500 TABLET ORAL at 11:57

## 2022-05-24 RX ADMIN — ROCURONIUM BROMIDE 10 MG: 50 INJECTION INTRAVENOUS at 12:57

## 2022-05-24 RX ADMIN — FERROUS SULFATE TAB 325 MG (65 MG ELEMENTAL FE) 325 MG: 325 (65 FE) TAB at 19:41

## 2022-05-24 RX ADMIN — GLYCOPYRROLATE 0.2 MG: 0.2 INJECTION INTRAMUSCULAR; INTRAVENOUS at 12:47

## 2022-05-24 RX ADMIN — Medication 200 MCG: at 14:30

## 2022-05-24 RX ADMIN — SUCCINYLCHOLINE CHLORIDE 180 MG: 20 INJECTION, SOLUTION INTRAMUSCULAR; INTRAVENOUS at 12:57

## 2022-05-24 RX ADMIN — HYDROMORPHONE HYDROCHLORIDE 0.5 MG: 1 INJECTION, SOLUTION INTRAMUSCULAR; INTRAVENOUS; SUBCUTANEOUS at 15:15

## 2022-05-24 RX ADMIN — MIDAZOLAM 2 MG: 1 INJECTION, SOLUTION INTRAMUSCULAR; INTRAVENOUS at 12:28

## 2022-05-24 RX ADMIN — ROCURONIUM BROMIDE 30 MG: 50 INJECTION INTRAVENOUS at 13:05

## 2022-05-24 RX ADMIN — FAMOTIDINE 20 MG: 20 TABLET ORAL at 11:57

## 2022-05-24 RX ADMIN — PREGABALIN 25 MG: 25 CAPSULE ORAL at 19:41

## 2022-05-24 RX ADMIN — FENTANYL CITRATE 100 MCG: 50 INJECTION, SOLUTION INTRAMUSCULAR; INTRAVENOUS at 12:36

## 2022-05-24 RX ADMIN — HYDROMORPHONE HYDROCHLORIDE 0.5 MG: 1 INJECTION, SOLUTION INTRAMUSCULAR; INTRAVENOUS; SUBCUTANEOUS at 16:20

## 2022-05-24 RX ADMIN — Medication 200 MCG: at 13:53

## 2022-05-24 RX ADMIN — DEXMEDETOMIDINE HYDROCHLORIDE 10 MCG: 100 INJECTION, SOLUTION, CONCENTRATE INTRAVENOUS at 12:47

## 2022-05-24 RX ADMIN — METOPROLOL SUCCINATE 100 MG: 50 TABLET, EXTENDED RELEASE ORAL at 21:18

## 2022-05-24 RX ADMIN — ROPIVACAINE HYDROCHLORIDE 30 ML: 2 INJECTION, SOLUTION EPIDURAL; INFILTRATION at 12:28

## 2022-05-24 RX ADMIN — METOPROLOL TARTRATE 1 MG: 5 INJECTION, SOLUTION INTRAVENOUS at 13:25

## 2022-05-24 RX ADMIN — KETOROLAC TROMETHAMINE 15 MG: 15 INJECTION, SOLUTION INTRAMUSCULAR; INTRAVENOUS at 19:30

## 2022-05-24 RX ADMIN — ACETAMINOPHEN 1000 MG: 500 TABLET ORAL at 19:40

## 2022-05-24 RX ADMIN — LEVOTHYROXINE SODIUM 150 MCG: 75 TABLET ORAL at 21:18

## 2022-05-24 RX ADMIN — LIDOCAINE HYDROCHLORIDE 40 MG: 20 INJECTION, SOLUTION EPIDURAL; INFILTRATION; INTRACAUDAL; PERINEURAL at 12:57

## 2022-05-24 RX ADMIN — SODIUM CHLORIDE, SODIUM LACTATE, POTASSIUM CHLORIDE, AND CALCIUM CHLORIDE 100 ML/HR: 600; 310; 30; 20 INJECTION, SOLUTION INTRAVENOUS at 17:30

## 2022-05-24 RX ADMIN — HYDRALAZINE HYDROCHLORIDE 10 MG: 20 INJECTION, SOLUTION INTRAMUSCULAR; INTRAVENOUS at 13:27

## 2022-05-24 RX ADMIN — DEXMEDETOMIDINE HYDROCHLORIDE 6 MCG: 100 INJECTION, SOLUTION, CONCENTRATE INTRAVENOUS at 13:23

## 2022-05-24 RX ADMIN — MIDAZOLAM HYDROCHLORIDE 2 MG: 2 INJECTION, SOLUTION INTRAMUSCULAR; INTRAVENOUS at 12:36

## 2022-05-24 RX ADMIN — PREGABALIN 50 MG: 50 CAPSULE ORAL at 11:57

## 2022-05-24 RX ADMIN — DOCUSATE SODIUM 50MG AND SENNOSIDES 8.6MG 1 TABLET: 8.6; 5 TABLET, FILM COATED ORAL at 19:41

## 2022-05-24 RX ADMIN — Medication 10 MG: at 13:52

## 2022-05-24 RX ADMIN — SODIUM CHLORIDE, SODIUM LACTATE, POTASSIUM CHLORIDE, AND CALCIUM CHLORIDE 25 ML/HR: 600; 310; 30; 20 INJECTION, SOLUTION INTRAVENOUS at 11:57

## 2022-05-24 RX ADMIN — SUGAMMADEX 200 MG: 100 INJECTION, SOLUTION INTRAVENOUS at 14:53

## 2022-05-24 RX ADMIN — ROPIVACAINE HYDROCHLORIDE 60 MG: 2 INJECTION, SOLUTION EPIDURAL; INFILTRATION at 12:36

## 2022-05-24 RX ADMIN — CELECOXIB 200 MG: 100 CAPSULE ORAL at 19:40

## 2022-05-24 RX ADMIN — FENTANYL CITRATE 100 MCG: 50 INJECTION, SOLUTION INTRAMUSCULAR; INTRAVENOUS at 12:28

## 2022-05-24 RX ADMIN — PROPOFOL 150 MG: 10 INJECTION, EMULSION INTRAVENOUS at 12:57

## 2022-05-24 RX ADMIN — SODIUM CHLORIDE 100 ML/HR: 900 INJECTION, SOLUTION INTRAVENOUS at 19:44

## 2022-05-24 RX ADMIN — METOPROLOL TARTRATE 2 MG: 5 INJECTION, SOLUTION INTRAVENOUS at 13:21

## 2022-05-24 RX ADMIN — DEXAMETHASONE SODIUM PHOSPHATE 4 MG: 4 INJECTION, SOLUTION INTRAMUSCULAR; INTRAVENOUS at 13:24

## 2022-05-24 RX ADMIN — WATER 3 G: 1 INJECTION INTRAMUSCULAR; INTRAVENOUS; SUBCUTANEOUS at 19:32

## 2022-05-24 RX ADMIN — ONDANSETRON 4 MG: 2 INJECTION INTRAMUSCULAR; INTRAVENOUS at 13:24

## 2022-05-24 NOTE — ANESTHESIA POSTPROCEDURE EVALUATION
Procedure(s):  RIGHT TOTAL HIP ARTHROPLASTY LATERAL/GEN/HILARY TO ASSIST/NERVE BLOCK.    general    Anesthesia Post Evaluation      Multimodal analgesia: multimodal analgesia used between 6 hours prior to anesthesia start to PACU discharge  Patient location during evaluation: bedside  Patient participation: complete - patient participated  Level of consciousness: awake  Pain management: adequate  Airway patency: patent  Anesthetic complications: no  Cardiovascular status: stable  Respiratory status: acceptable  Hydration status: acceptable  Post anesthesia nausea and vomiting:  controlled      INITIAL Post-op Vital signs:   Vitals Value Taken Time   /56 05/24/22 1719   Temp 36.7 °C (98.1 °F) 05/24/22 1722   Pulse 72 05/24/22 1722   Resp 16 05/24/22 1722   SpO2 100 % 05/24/22 1722   Vitals shown include unvalidated device data.

## 2022-05-24 NOTE — BRIEF OP NOTE
Brief Postoperative Note    Patient: Oswaldo Jeffery  YOB: 1945  MRN: [id number]    Date of Procedure: 5/24/2022     Pre-Op Diagnosis: Osteoarthritis of right hip, unspecified osteoarthritis type [M16.11] with obesity    Post-Op Diagnosis: Same as preoperative diagnosis. Procedure(s):  RIGHT TOTAL HIP ARTHROPLASTY LATERAL/GEN/HILARY TO ASSIST/NERVE BLOCK    Surgeon(s):  Paula Gardner MD    Surgical Assistant: Physician Assistant: Tiffanie Haddad PA-C  Surg Asst-1: Evelena Sides    Anesthesia: General     Estimated Blood Loss (mL): 744     Complications: None    Specimens:   ID Type Source Tests Collected by Time Destination   1 : right femoral head Preservative   Paula Gardner MD 5/24/2022 1342 Pathology        Implants:   Implant Name Type Inv.  Item Serial No.  Lot No. LRB No. Used Action   hex dome hole plug    [employer] [id number] Right 1 Implanted   SCREW ACET HEX LOW PROFILE 6.5X25MM TRIDENT II - [id number]  SCREW ACET HEX LOW PROFILE 6.5X25MM TRIDENT II  GEN [employer] [id number] Right 1 Implanted   SCREW BNE L20MM [id number] LO PROF HEX TRIDENT LL - [id number]  SCREW BNE L20MM [id number] LO PROF HEX TRIDENT LL  GEN [employer] [id number] Right 1 Implanted   SHELL ACET CLUS H 54E TRTANIUM -- TRIDENT II - [id number]  SHELL ACET CLUS H 54E TRTANIUM -- TRIDENT II  GEN [employer] [facility] [id number] Right 1 Implanted   INSERT ACET ECC E 0 DEG 36 MM HIP X3 TRIDENT - [id number]  INSERT ACET ECC E 0 DEG 36 MM HIP X3 TRIDENT  [employer] [facility] [id number] Right 1 Implanted   STEM FEM SZ 5 127D [id number] -- ACCOLADE II V40 - [id number]  STEM FEM SZ 5 127D [id number] -- ACCOLADE II V40  [employer] [facility] [id number] Right 1 Implanted   HEAD FEM DELT V40 +0MM NK 36MM -- V40 BIOLOX - [id number]  HEAD FEM DELT V40 +0MM NK 36MM -- V40 BIOLOX  [employer] HOWM_WD [id number] Right 1 Implanted       Drains: * No LDAs found *    Findings: same    Electronically Signed by Ashlie Stanley MD on 5/24/2022 at 2:39 PM

## 2022-05-24 NOTE — ANESTHESIA PREPROCEDURE EVALUATION
Relevant Problems   No relevant active problems       Anesthetic History   No history of anesthetic complications            Review of Systems / Medical History  Patient summary reviewed and pertinent labs reviewed    Pulmonary        Sleep apnea: CPAP  Shortness of breath         Neuro/Psych             Comments: Chronic pain  Opioid tolerance Cardiovascular    Hypertension        Dysrhythmias : atrial fibrillation      Exercise tolerance: <4 METS     GI/Hepatic/Renal         Renal disease: CRI       Endo/Other      Hypothyroidism  Morbid obesity and arthritis     Other Findings              Physical Exam    Airway  Mallampati: II  TM Distance: > 6 cm  Neck ROM: normal range of motion   Mouth opening: Normal     Cardiovascular  Regular rate and rhythm,  S1 and S2 normal,  no murmur, click, rub, or gallop             Dental    Dentition: Caps/crowns     Pulmonary      Decreased breath sounds: bilateral           Abdominal  GI exam deferred       Other Findings            Anesthetic Plan    ASA: 3  Anesthesia type: general      Post-op pain plan if not by surgeon: peripheral nerve block single    Induction: Intravenous  Anesthetic plan and risks discussed with: Patient

## 2022-05-24 NOTE — ROUTINE PROCESS
Patient received via bd alertx4, abductor pillow in place  No complaints of pain  [facility]

## 2022-05-24 NOTE — ANESTHESIA PROCEDURE NOTES
Peripheral Block    Start time: 5/24/2022 12:25 PM  End time: 5/24/2022 12:35 PM  Performed by: Megan Harrell MD  Authorized by: Megan Harrell MD       Pre-procedure:    Indications: at surgeon's request and post-op pain management    Preanesthetic Checklist: patient identified, risks and benefits discussed, site marked, timeout performed, anesthesia consent given and patient being monitored    Timeout Time: 12:35 EDT          Block Type:   Block Type:  Lumbar plexus  Laterality:  Right  Monitoring:  Standard ASA monitoring, responsive to questions, continuous pulse ox, oxygen, frequent vital sign checks and heart rate  Injection Technique:  Single shot  Procedures: nerve stimulator    Patient Position: left lateral decubitus  Prep: chlorhexidine    Location:  Sacral area  Needle Type:  Stimuplex  Needle Gauge:  20 G  Needle Localization:  Nerve stimulator  Medication Injected:  FentaNYL citrate (PF) injection sedation initial, 100 mcg  midazolam (VERSED) injection, 2 mg  ropivacaine (NAROPIN) 2 mg/mL (0.2 %) injection, 60 mg  Med Admin Time: 5/24/2022 12:36 PM    Assessment:  Number of attempts:  1  Injection Assessment:  Incremental injection every 5 mL, negative aspiration for CSF, no paresthesia, no intravascular symptoms, negative aspiration for blood and local visualized surrounding nerve on ultrasound  Patient tolerance:  Patient tolerated the procedure well with no immediate complications

## 2022-05-24 NOTE — INTERVAL H&P NOTE
Update History & Physical    The Patient's History and Physical of May 24,   2022 was reviewed with the patient and I examined the patient. There was no change. The surgical site was confirmed by the patient and me. Plan:  The risk, benefits, expected outcome, and alternative to the recommended procedure have been discussed with the patient. Patient understands and wants to proceed with the procedure.     Electronically signed by Randy Hawkins MD on 5/24/2022 at 12:15 PM

## 2022-05-25 ENCOUNTER — HOME HEALTH ADMISSION (OUTPATIENT)
Dept: HOME HEALTH SERVICES | Facility: HOME HEALTH | Age: 77
End: 2022-05-25
Payer: MEDICARE

## 2022-05-25 VITALS
TEMPERATURE: 97.8 F | HEART RATE: 59 BPM | DIASTOLIC BLOOD PRESSURE: 56 MMHG | RESPIRATION RATE: 18 BRPM | OXYGEN SATURATION: 96 % | BODY MASS INDEX: 40.63 KG/M2 | WEIGHT: 300 LBS | HEIGHT: 72 IN | SYSTOLIC BLOOD PRESSURE: 100 MMHG

## 2022-05-25 LAB
ABO + RH BLD: NORMAL
BLOOD BANK CMNT PATIENT-IMP: NORMAL
BLOOD GROUP ANTIBODIES SERPL: NORMAL
SPECIMEN EXP DATE BLD: NORMAL

## 2022-05-25 PROCEDURE — 74011000250 HC RX REV CODE- 250: Performed by: ORTHOPAEDIC SURGERY

## 2022-05-25 PROCEDURE — 74011250636 HC RX REV CODE- 250/636: Performed by: ORTHOPAEDIC SURGERY

## 2022-05-25 PROCEDURE — 97116 GAIT TRAINING THERAPY: CPT

## 2022-05-25 PROCEDURE — 97165 OT EVAL LOW COMPLEX 30 MIN: CPT

## 2022-05-25 PROCEDURE — 97535 SELF CARE MNGMENT TRAINING: CPT

## 2022-05-25 PROCEDURE — G0378 HOSPITAL OBSERVATION PER HR: HCPCS

## 2022-05-25 PROCEDURE — 97161 PT EVAL LOW COMPLEX 20 MIN: CPT

## 2022-05-25 PROCEDURE — 74011250637 HC RX REV CODE- 250/637: Performed by: ORTHOPAEDIC SURGERY

## 2022-05-25 PROCEDURE — 97162 PT EVAL MOD COMPLEX 30 MIN: CPT

## 2022-05-25 RX ADMIN — DOCUSATE SODIUM 50MG AND SENNOSIDES 8.6MG 1 TABLET: 8.6; 5 TABLET, FILM COATED ORAL at 10:02

## 2022-05-25 RX ADMIN — PREGABALIN 25 MG: 25 CAPSULE ORAL at 10:02

## 2022-05-25 RX ADMIN — CELECOXIB 200 MG: 100 CAPSULE ORAL at 10:02

## 2022-05-25 RX ADMIN — SODIUM CHLORIDE, PRESERVATIVE FREE 10 ML: 5 INJECTION INTRAVENOUS at 05:25

## 2022-05-25 RX ADMIN — FERROUS SULFATE TAB 325 MG (65 MG ELEMENTAL FE) 325 MG: 325 (65 FE) TAB at 10:02

## 2022-05-25 RX ADMIN — ACETAMINOPHEN 1000 MG: 500 TABLET ORAL at 05:24

## 2022-05-25 RX ADMIN — ASPIRIN 81 MG: 81 TABLET, COATED ORAL at 10:02

## 2022-05-25 RX ADMIN — KETOROLAC TROMETHAMINE 15 MG: 15 INJECTION, SOLUTION INTRAMUSCULAR; INTRAVENOUS at 05:24

## 2022-05-25 RX ADMIN — WATER 3 G: 1 INJECTION INTRAMUSCULAR; INTRAVENOUS; SUBCUTANEOUS at 03:22

## 2022-05-25 NOTE — ROUTINE PROCESS
0700 Bedside shift change report given to [facility] ANISH RDZ rn (oncoming nurse) by Machelle Diego (offgoing nurse). Report included the following information SBAR and Kardex.

## 2022-05-25 NOTE — PROGRESS NOTES
Discharge order noted for today. Pt has been accepted to [facility] agency. Met with patient  and are agreeable to the transition plan today. Transport has been arranged through pt's wife and son. Patient's discharge summary and home health  orders have been forwarded to [facility] home health  agency. Updated bedside RN, ,  to the transition plan.   Discharge information has been documented on the AVS.             VENU Gaston RN  Care Management  Pager: [phone]

## 2022-05-25 NOTE — PROGRESS NOTES
Ortho    Pt. Seen and evaluated. Doing well, up in chair, progressed well with PT, pain well controlled  Denies cp, sob, abd pain    Visit Vitals  BP (!) 100/56 (BP 1 Location: Right upper arm, BP Patient Position: At rest)   Pulse (!) 59   Temp 97.8 °F (36.6 °C)   Resp 18   Ht 6' (1.829 m)   Wt 300 lb (136.1 kg)   SpO2 96%   BMI 40.69 kg/m²       right total hip replacement  right hip Woundclean, dry, no drainage  Sensory intact to LT  Motor intact  nv intact  Neg calf tenderness. Labs. CBC  @  CBC:   Lab Results   Component Value Date/Time    WBC 6.0 05/10/2022 09:15 AM    RBC 4.92 05/10/2022 09:15 AM    HGB 15.3 05/10/2022 09:15 AM    HCT 45.9 05/10/2022 09:15 AM    PLATELET 520 16/53/8158 09:15 AM    and BMP:   Lab Results   Component Value Date/Time    Glucose 92 05/10/2022 09:15 AM    Sodium 140 05/10/2022 09:15 AM    Potassium 4.2 05/10/2022 09:15 AM    Chloride 107 05/10/2022 09:15 AM    CO2 29 05/10/2022 09:15 AM    BUN 15 05/10/2022 09:15 AM    Creatinine 0.96 05/10/2022 09:15 AM    Calcium 9.0 05/10/2022 09:15 AM   @  Coagulation  Lab Results   Component Value Date    INR 1.1 05/10/2022    APTT 32.0 05/10/2022      Basic Metabolic Profile  Lab Results   Component Value Date     05/10/2022    CO2 29 05/10/2022    BUN 15 05/10/2022         Assesment:rightOrthopedic / Rheumatologic: Total Hip Replacement  Past Medical History:   Diagnosis Date    Arrhythmia 2006    AFib. before diagnosis of Graves Disease    Chronic right hip pain     Hypertension     Sleep apnea     uses CPAP    Spinal stenosis     Thyroid disease 06/09/2006    Killed Thyroid with Iodine 131 had Graves Disease       Plan:  aspirin, PT- wbat.   Plan for home today once cleared by PT

## 2022-05-25 NOTE — HOME CARE
Received home health referral for [facility] for (SN, PT per protocol). Spoke with patient via phone;  patient identifiers verified. Explained home care services and routines. Demographics verified including insurance, phone and address confirmed. Patient has the following DME: no new orders noted. Caregivers available spouse and other family as primary caregivers.   Orders noted and arranged to be processed to central intake.      ----    Kimberlee Kinney, AGNES  [address]

## 2022-05-25 NOTE — NURSE NAVIGATOR
Rounded on patient s/p right total hip replacement with Dr. Darylene Ober 05/25/2022. Patient observed to be alert and oriented x 3 , sitting up in bed, wedge pillow between legs. Patient denies chest pain, shortness of breath, nausea or vomiting. He states that pain is well controlled at present. Per patient he is voiding without difficulty as he has not yet been out of bed. Education provided regarding the importance of early and frequent ambulation to assist with pain and stiffness. Patient states that he has a bariatric walker in his car, but that he removed the wheels because they didn't roll well. Total hip replacement education book provided to patient. Education regarding the use of incentive spirometry provided. His dressing is clean, dry and intact. He has no questions at this time.

## 2022-05-25 NOTE — PROGRESS NOTES
Reason for Admission:  Osteoarthritis of right hip, unspecified osteoarthritis type [M16.11]  Hip arthritis [M16.10]                 RUR Score:    N/A           Plan for utilizing home health: Yes                       Likelihood of Readmission:   LOW                         Transition of Care Plan:              Initial assessment completed with patient. Cognitive status of patient: oriented to time, place, person and situation. Face sheet information confirmed:  yes. The patient designates his wife Laurita Gillespie and son Emir Ramirez to participate in his discharge plan and to receive any needed information. This patient lives in a single family home with wife. Patient is able to navigate steps as needed. Prior to hospitalization, patient was considered to be independent with ADLs/IADLS : yes . Per pt, his son is in town and will be staying with him to assist with his needs  Patient has a current ACP document on file: no      Healthcare Decision Maker:   Primary Decision Maker: Jessica Chikis - Rohit - [phone]    Click here to [address] including selection of the Healthcare Decision Maker Relationship (ie \"Primary\")    The son and wife will be available to transport patient home upon discharge. The patient already has Richard Hope, Shower chair, Grab bars,  medical equipment available in the home. Per pt, his rolling walker is too large and the wheels are out. He stated he will need the regular size. Patient is not currently active with home health. Patient has not stayed in a skilled nursing facility or rehab. Was  stay within last 60 days : no. This patient is on dialysis :no    List of available Home Health agencies were provided and reviewed with the patient prior to discharge. Freedom of choice signed: yes, for B. Currently, the discharge plan is Home with [address] [place] [address] Sergey Suresh.     The patient states that he can obtain his medications from the pharmacy, and take his medications as directed. Patient's current insurance is Medicare,        Care Management Interventions  PCP Verified by CM: Yes  Palliative Care Criteria Met (RRAT>21 & CHF Dx)?: No  Mode of Transport at Discharge:  Other (see comment)  Transition of Care Consult (CM Consult): [address]: Yes  Physical Therapy Consult: Yes  Occupational Therapy Consult: Yes  Support Systems: Spouse/Significant Other,Child(jesenia)  Confirm Follow Up Transport: Family  The Patient and/or Patient Representative was Provided with a Choice of Provider and Agrees with the Discharge Plan?: Yes  Freedom of Choice List was Provided with Basic Dialogue that Supports the Patient's Individualized Plan of Care/Goals, Treatment Preferences and Shares the Quality Data Associated with the Providers?: Yes  Discharge Location  Patient Expects to be Discharged to[de-identified] Home with home health        VENU Thomason RN  Care Management  Pager: [phone]

## 2022-05-25 NOTE — PROGRESS NOTES
Problem: Mobility Impaired (Adult and Pediatric)  Goal: *Acute Goals and Plan of Care (Insert Text)  Outcome: Resolved/Met   PHYSICAL THERAPY EVALUATION AND DISCHARGE    Patient: Oswaldo Jeffery (73 y.o. male)  Date: 5/25/2022  Primary Diagnosis: Osteoarthritis of right hip, unspecified osteoarthritis type [M16.11]  Hip arthritis [M16.10]  Procedure(s) (LRB):  RIGHT TOTAL HIP ARTHROPLASTY LATERAL/GEN/RICHARD TO ASSIST/NERVE BLOCK (Right) 1 Day Post-Op   Precautions: Fall,PWB,WBAT  PWB, WBAT  PLOF: Ambulate with cane in community. Retired  / [employer]. Current volunteer at [employer]. ASSESSMENT :  Pt seated in chair with legs elevated; NAD, just worked with OT. Went over hip precautions, pt recalled 2/3 and reminded or no IR. Pt good historian on past L CALEB and following treatment. Eager to walk and negotiate stairs. Is modified independent with all mobility, needing additional time and RW for balance and safety. Ambulated 40ft from SBA to modified independent, compliant with weight bearing orders reporting he puts \"maybe a little more than 50% but I stop right before it hurts. \" Pt with good endurance during ambulation, able to talk consistently without SOB. Negotiated 4 stairs with bilateral handrail use, and educated on which LE to go ascend / descend first. Pt needing seated rest break after stairs, then ambulated 40ft back to chair with modified independence. Pt stand to sit was safe and modified independent, needing additional time. Pt left with all needs met and call bell in reach. Patient is cleared by PT for discharge to home with post acute rehab services and family support. Skilled physical therapy is not indicated at this time.       PLAN :  Discharge Recommendations: Home Health  Further Equipment Recommendations for Discharge: straight cane (has) and rolling walker (has bariatric, acquiring standard)     SUBJECTIVE:   Patient stated I could feel the hardware in my femur.    OBJECTIVE DATA SUMMARY:     Past Medical History:   Diagnosis Date    Arrhythmia 2006    AFib. before diagnosis of Graves Disease    Chronic right hip pain     Hypertension     Sleep apnea     uses CPAP    Spinal stenosis     Thyroid disease 06/09/2006    Killed Thyroid with Iodine 131 had Graves Disease     Past Surgical History:   Procedure Laterality Date    HX CATARACT REMOVAL Bilateral 9/2021 , 10/2021    HX COLONOSCOPY      HX HERNIA REPAIR  1990    [place] SINUS SURGERY PROC UNLISTED  2012    [place] TOTAL HIP ARTHROPLASTY Left 01/2010    by Dr. Daina Ray     Barriers to Learning/Limitations: yes;  sensory deficits-vision/hearing/speech  Compensate with: Visual Cues, Verbal Cues, Tactile Cues and Kinesthetic Cues  Home Situation:   Home Situation  Home Environment: Private residence  # Steps to Enter: 5  Rails to Enter: Yes  Hand Rails : Bilateral  One/Two Story Residence: Two story, live on 1st floor  # of Interior Steps: 8  Interior Rails: Right  Living Alone: No  Support Systems: Spouse/Significant Other,Child(jesenia)  Patient Expects to be Discharged to[de-identified] Home  Current DME Used/Available at Home: Cane, straight,Walker, rolling  Critical Behavior:  Neurologic State: Alert  Orientation Level: Oriented X4  Cognition: Follows commands; Appropriate safety awareness     Psychosocial  Patient Behaviors: Talkative  Purposeful Interaction: Yes  Pt Identified Daily Priority: Clinical issues (comment)  Caritas Process: Establish trust  Caring Interventions: Therapeutic modalities  Reassure: Therapeutic listening  Therapeutic Modalities: Deep breathing    Strength:    Manual Muscle Testing (LE)         R     L    Hip Flexion:  Deferred/5  5/5  Knee EXT:   4-/5  5/5  Knee FLEX:   4+/5  5/5  Ankle DF:   5/5  5/5  _________________________________________________   Range Of Motion:  BLE WFL  Functional Mobility:  Transfers:  Sit to Stand: Modified independent; Additional time  Stand to Sit: Modified independent; Additional time  Balance:   Sitting: Intact  Standing: Impaired  Standing - Static: Good  Standing - Dynamic : Fair  Ambulation/Gait Training:  Distance (ft):  (40ftx2)   Assistive Device: Walker, rolling  Ambulation - Level of Assistance: Modified independent  Right Side Weight Bearing: Partial (%); As tolerated  Stairs:   Level of Assistance: Modified independent and Additional time  Assistive Device:  None  Rail Use: both  Number of Stairs: 4    Pain:  Pain level pre-treatment: 0/10   Pain level post-treatment: 0/10    Activity Tolerance:   Good    After treatment:   [x]         Patient left in no apparent distress sitting up in chair  []         Patient left in no apparent distress in bed  [x]         Call bell left within reach  [x]         Nursing notified  []         Caregiver present  []         Bed alarm activated  []         SCDs applied    COMMUNICATION/EDUCATION:   [x]         Role of physical therapy in the acute care setting. [x]         Fall prevention education was provided and the patient/caregiver indicated understanding. [x]         Patient/family have participated as able in goal setting and plan of care. [x]         Patient/family agree to work toward stated goals and plan of care. []         Patient understands intent and goals of therapy, but is neutral about his/her participation. []         Patient is unable to participate in goal setting/plan of care: ongoing with therapy staff.     Thank you for this referral.  Lucrecia Ricketts, SPT   Time Calculation: 26 mins    Eval Complexity: History: MEDIUM  Complexity : 1-2 comorbidities / personal factors will impact the outcome/ POC Exam:MEDIUM Complexity : 3 Standardized tests and measures addressing body structure, function, activity limitation and / or participation in recreation  Presentation: LOW Complexity : Stable, uncomplicated  Clinical Decision Making:Low Complexity   Clinical judgement; ROM, MMT, functional mobility Overall Complexity:LOW

## 2022-05-25 NOTE — PROGRESS NOTES
OCCUPATIONAL THERAPY EVALUATION/DISCHARGE    Patient: Taras Paz (10 y.o. male)  Date: 5/25/2022  Primary Diagnosis: Osteoarthritis of right hip, unspecified osteoarthritis type [M16.11]  Hip arthritis [M16.10]  Procedure(s) (LRB):  RIGHT TOTAL HIP ARTHROPLASTY LATERAL/GEN/RICHARD TO ASSIST/NERVE BLOCK (Right) 1 Day Post-Op   Precautions:   Fall,PWB,WBAT  PLOF: Pt reports being Md Ind for ADLs and functional mobility. Pt lives with spouse and reports he has all AE for LB ADLs and DME for home safety which he purchased prior to having surgery. ASSESSMENT AND RECOMMENDATIONS:  Pt cleared to participate in OT evaluation by RN. Upon entering room, pt received in bed, alert, and agreeable to OT eval/treatment. Patient educated on the role of OT, hip precautions and how they relate to ADLs and functional mobility. Pt verbalized understanding and was able to return education. Pt educated on AE for LB ADLs such as reacher, sock aid, long handled shoe horn, and long handled sponge. Pt reports practicing the above equipment at home for LB ADLs and reports no concerns regarding his ability to use it upon DC. Pt performed all seated ADLs with Mod Ind and benefits from Supervision for standing ADLs. Pt required Min A for socks management, however, verbalized appropriate use of sock said, which he'll be using at home. Pt maneuvered to the BR with FWW benefiting from Supervision for safety. Pt has supportive family at home to assist as needed for ADls and provide Supervision for safety. Skilled occupational therapy is not indicated at this time. Discharge Recommendations: Home Health  Further Equipment Recommendations for Discharge: shower chair      SUBJECTIVE:   Patient stated Jovon Smith had to prepare    OBJECTIVE DATA SUMMARY:     Past Medical History:   Diagnosis Date    Arrhythmia 2006    AFib.  before diagnosis of Graves Disease    Chronic right hip pain     Hypertension     Sleep apnea     uses CPAP    Spinal stenosis     Thyroid disease 06/09/2006    Killed Thyroid with Iodine 131 had Graves Disease     Past Surgical History:   Procedure Laterality Date    HX CATARACT REMOVAL Bilateral 9/2021 , 10/2021    HX COLONOSCOPY      HX HERNIA REPAIR  1990    [place] SINUS SURGERY PROC UNLISTED  2012    [place] TOTAL HIP ARTHROPLASTY Left 01/2010    by Dr. Bonnie Stanley     Barriers to Learning/Limitations: None  Compensate with: visual, verbal, tactile, kinesthetic cues/model    Home Situation:   Home Situation  Home Environment: Private residence  # Steps to Enter: 5  Rails to Enter: Yes  Hand Rails : Bilateral  One/Two Story Residence: Two story, live on 1st floor  # of Interior Steps: 8  Interior Rails: Right  Living Alone: No  Support Systems: Spouse/Significant Other,Child(jesenia)  Patient Expects to be Discharged to[de-identified] Home with home health  Current DME Used/Available at Home: Navarro Sorrel, straight,Walker, rolling  Tub or Shower Type: Shower  [x]     Right hand dominant   []     Left hand dominant    Cognitive/Behavioral Status:  Neurologic State: Alert  Orientation Level: Oriented X4  Cognition: Follows commands; Appropriate decision making  Safety/Judgement: Awareness of environment; Fall prevention    Skin: visible skin intact  Edema: none noted    Vision/Perceptual:       Acuity: Able to read clock/calendar on wall without difficulty    Corrective Lenses: Glasses    Coordination: BUE  Coordination: Within functional limits  Fine Motor Skills-Upper: Left Intact; Right Intact    Gross Motor Skills-Upper: Left Intact; Right Intact    Balance:  Sitting: Intact  Standing: Impaired  Standing - Static: Good  Standing - Dynamic : Fair    Strength: BUE  Strength:  Within functional limits   Tone & Sensation: BUE  Tone: Normal  Sensation: Intact   Range of Motion: BUE  AROM: Within functional limits   Functional Mobility and Transfers for ADLs:  Bed Mobility:     Supine to Sit: Supervision     Scooting: Supervision  Transfers:  Sit to Stand: Supervision;Modified independent  Stand to Sit: Supervision   Toilet Transfer : Supervision    Bathroom Mobility: Supervision/set up    ADL Assessment:  Feeding: Setup  Oral Facial Hygiene/Grooming: Setup  Bathing: Supervision  Upper Body Dressing: Modified independent  Lower Body Dressing: Minimum assistance  Toileting: Supervision     ADL Intervention:     Cognitive Retraining  Safety/Judgement: Awareness of environment; Fall prevention  Pain:  Pain level pre-treatment: not rated   Pain level post-treatment: not rated     Activity Tolerance:   Good  Please refer to the flowsheet for vital signs taken during this treatment. After treatment:   [x]  Patient left in no apparent distress sitting up in chair  []  Patient left in no apparent distress in bed  [x]  Call bell left within reach  [x]  Nursing notified  []  Caregiver present  []  Bed alarm activated    COMMUNICATION/EDUCATION:   [x]      Role of Occupational Therapy in the acute care setting  [x]      Home safety education was provided and the patient/caregiver indicated understanding. [x]      Patient/family have participated as able and agree with findings and recommendations. []      Patient is unable to participate in plan of care at this time. Thank you for this referral.  Larry Hoyos OTR/L  Time Calculation: 41 mins      Eval Complexity: History: LOW Complexity : Brief history review ; Examination: LOW Complexity : 1-3 performance deficits relating to physical, cognitive , or psychosocial skils that result in activity limitations and / or participation restrictions ;    Decision Making:LOW Complexity : No comorbidities that affect functional and no verbal or physical assistance needed to complete eval tasks

## 2022-05-25 NOTE — PROGRESS NOTES
Problem: Pain  Goal: *Control of Pain  Outcome: Progressing Towards Goal     Problem: Patient Education: Go to Patient Education Activity  Goal: Patient/Family Education  Outcome: Progressing Towards Goal     Problem: Falls - Risk of  Goal: *Absence of Falls  Description: Document Steven Ordoñez Fall Risk and appropriate interventions in the flowsheet. Outcome: Progressing Towards Goal  Note: Fall Risk Interventions:  Mobility Interventions: Patient to call before getting OOB         Medication Interventions: Teach patient to arise slowly    Elimination Interventions: Call light in reach              Problem: Patient Education: Go to Patient Education Activity  Goal: Patient/Family Education  Outcome: Progressing Towards Goal     Problem: Pressure Injury - Risk of  Goal: *Prevention of pressure injury  Description: Document Gavino Scale and appropriate interventions in the flowsheet. Outcome: Progressing Towards Goal  Note: Pressure Injury Interventions:             Activity Interventions: Assess need for specialty bed    Mobility Interventions: Assess need for specialty bed    Nutrition Interventions: Document food/fluid/supplement intake    Friction and Shear Interventions: HOB 30 degrees or less                Problem: Patient Education: Go to Patient Education Activity  Goal: Patient/Family Education  Outcome: Progressing Towards Goal

## 2022-05-25 NOTE — ROUTINE PROCESS
Patient is alert and oriented times three with no signs or symptoms of distress. pulses palpable and dressing intact

## 2022-05-25 NOTE — PROGRESS NOTES
Problem: Pain  Goal: *Control of Pain  5/25/2022 1134 by Ann León RN  Outcome: Resolved/Met  5/25/2022 0906 by Ann León RN  Outcome: Progressing Towards Goal     Problem: Patient Education: Go to Patient Education Activity  Goal: Patient/Family Education  5/25/2022 1134 by Ann León RN  Outcome: Resolved/Met  5/25/2022 0906 by Ann León RN  Outcome: Progressing Towards Goal     Problem: Falls - Risk of  Goal: *Absence of Falls  Description: Document Ivonne Mejias Fall Risk and appropriate interventions in the flowsheet. 5/25/2022 1134 by Ann León RN  Outcome: Resolved/Met  5/25/2022 0906 by Ann León RN  Outcome: Progressing Towards Goal  Note: Fall Risk Interventions:  Mobility Interventions: Patient to call before getting OOB         Medication Interventions: Teach patient to arise slowly    Elimination Interventions: Call light in reach              Problem: Patient Education: Go to Patient Education Activity  Goal: Patient/Family Education  5/25/2022 1134 by Ann León RN  Outcome: Resolved/Met  5/25/2022 0906 by Ann León RN  Outcome: Progressing Towards Goal     Problem: Pressure Injury - Risk of  Goal: *Prevention of pressure injury  Description: Document Gavino Scale and appropriate interventions in the flowsheet. 5/25/2022 1134 by Ann León RN  Outcome: Resolved/Met  5/25/2022 0906 by Ann León RN  Outcome: Progressing Towards Goal  Note: Pressure Injury Interventions:             Activity Interventions: Assess need for specialty bed    Mobility Interventions: Assess need for specialty bed    Nutrition Interventions: Document food/fluid/supplement intake    Friction and Shear Interventions: HOB 30 degrees or less                Problem: Patient Education: Go to Patient Education Activity  Goal: Patient/Family Education  5/25/2022 1134 by Ann León RN  Outcome: Resolved/Met  5/25/2022 0906 by Ann León RN  Outcome: Progressing Towards Goal     Problem: Patient Education: Go to Patient Education Activity  Goal: Patient/Family Education  Outcome: Resolved/Met     Problem: Patient Education: Go to Patient Education Activity  Goal: Patient/Family Education  Outcome: Resolved/Met

## 2022-05-25 NOTE — DISCHARGE SUMMARY
5/24/2022  8:57 AM    5/25/2022, 11:33 AM    Primary Dx:right Orthopedic / Rheumatologic: Total Hip Replacement  Secondary Dx: Etiological Diagnoses: none    HPI:  Pt has end stage OA of their right hip and had failed conservative treatment. Due to the current findings and affected activity of daily living surgical intervention is indicated. The alternatives, risks, complications as well as expected outcome were discussed, the patient understands and wishes to proceed with surgery    Past Medical History:   Diagnosis Date    Arrhythmia 2006    AFib.  before diagnosis of Graves Disease    Chronic right hip pain     Hypertension     Sleep apnea     uses CPAP    Spinal stenosis     Thyroid disease 06/09/2006    Killed Thyroid with Iodine 131 had Graves Disease         Current Facility-Administered Medications:     levothyroxine (SYNTHROID) tablet 150 mcg, 150 mcg, Oral, QHS, Aaron Madera MD, 150 mcg at 05/24/22 2118    metoprolol succinate (TOPROL-XL) XL tablet 100 mg, 100 mg, Oral, QHS, Aaron Madera MD, 100 mg at 05/24/22 2118    0.9% sodium chloride infusion, 100 mL/hr, IntraVENous, CONTINUOUS, Aaron Madera MD, Last Rate: 100 mL/hr at 05/24/22 1944, 100 mL/hr at 05/24/22 1944    sodium chloride (NS) flush 5-40 mL, 5-40 mL, IntraVENous, Q8H, Joaquin Duffy MD, 10 mL at 05/25/22 0525    sodium chloride (NS) flush 5-40 mL, 5-40 mL, IntraVENous, PRN, Aaron Madera MD    acetaminophen (TYLENOL) tablet 1,000 mg, 1,000 mg, Oral, Q6H, Aaron Madera MD, 1,000 mg at 05/25/22 0524    oxyCODONE IR (ROXICODONE) tablet 10-15 mg, 10-15 mg, Oral, Q3H PRN, Aaron Madera MD    oxyCODONE IR (ROXICODONE) tablet 20 mg, 20 mg, Oral, Q3H PRN, Aaron Madera MD    celecoxib (CELEBREX) capsule 200 mg, 200 mg, Oral, BID, Aaron Madera MD, 200 mg at 05/25/22 1002    naloxone (NARCAN) injection 0.4 mg, 0.4 mg, IntraVENous, PRN, Aaron Madera, MD    flumazeniL (ROMAZICON) 0.1 mg/mL injection 0.2 mg, 0.2 mg, IntraVENous, PRN, Kristin Wagner MD    aspirin delayed-release tablet 81 mg, 81 mg, Oral, BID, Kristin Wagner MD, 81 mg at 05/25/22 1002    ceFAZolin (ANCEF) 3 g in sterile water (preservative free) 30 mL IV syringe, 3 g, IntraVENous, Q8H, Kristin Wagner MD, 3 g at 05/25/22 0840    ferrous sulfate tablet 325 mg, 1 Tablet, Oral, BID WITH MEALS, Kristin Wagner MD, 325 mg at 05/25/22 1002    ondansetron (ZOFRAN) injection 4 mg, 4 mg, IntraVENous, Q4H PRN, Kristin Wagner MD    senna-docusate (PERICOLACE) 8.6-50 mg per tablet 1 Tablet, 1 Tablet, Oral, BID, Kristin Wagner MD, 1 Tablet at 05/25/22 1002    ketorolac (TORADOL) injection 15 mg, 15 mg, IntraVENous, Q6H, Kristin Wagner MD, 15 mg at 05/25/22 0524    pregabalin (LYRICA) capsule 25 mg, 25 mg, Oral, BID, Kristin Wagner MD, 25 mg at 05/25/22 1002    Atorvastatin; Statins-hmg-coa reductase inhibitors; and Stings [sting, bee]    Physical Exam:  General A&O x3 NAD, well developed, well nourished, normal affect  Heart: S1-S2, RRR  Lungs: CTA Bilat  Abd: soft NT, ND  Ext: n/v intact    Hospital Course:    Pt. Had rightOrthopedic / Rheumatologic: Total Hip Replacement    Post -op Course: The patient tolerated the procedure well. Pt. Was place on Abx pre and post-op for prophylaxis against infection as well as aspirin post-op for prophylaxis against DVT. Vitals signs remained stable, remained af. The wound wasclean, dry, no drainage. Pain was well controlled. Pt. Had negative calf tenderness or swelling, no evidence for DVT. Patient had PT/OT consult for evaluation and treatment.     CBC  Lab Results   Component Value Date/Time    WBC 6.0 05/10/2022 09:15 AM    RBC 4.92 05/10/2022 09:15 AM    HCT 45.9 05/10/2022 09:15 AM    MCV 93.3 05/10/2022 09:15 AM    MCH 31.1 05/10/2022 09:15 AM    MCHC 33.3 05/10/2022 09:15 AM    RDW 12.8 05/10/2022 09:15 AM     Coagulation  Lab Results   Component Value Date    INR 1.1 05/10/2022    APTT 32.0 05/10/2022      Basic Metabolic Profile  Lab Results   Component Value Date     05/10/2022    CO2 29 05/10/2022    BUN 15 05/10/2022       Discharge Meds:  Current Discharge Medication List      START taking these medications    Details   oxyCODONE-acetaminophen (Percocet)  mg per tablet Take 1-2 Tablets by mouth every six (6) hours as needed for Pain for up to 7 days. Max Daily Amount: 8 Tablets. Qty: 56 Tablet, Refills: 0    Associated Diagnoses: Hip arthritis      docusate sodium (Colace) 100 mg capsule Take 1 Capsule by mouth two (2) times a day for 90 days. Qty: 60 Capsule, Refills: 2    Associated Diagnoses: Hip arthritis      ondansetron hcl (Zofran) 4 mg tablet Take 1 Tablet by mouth every eight (8) hours as needed for Nausea. Qty: 30 Tablet, Refills: 1    Associated Diagnoses: Hip arthritis      celecoxib (CeleBREX) 200 mg capsule Take 1 Capsule by mouth two (2) times a day for 90 days. Qty: 60 Capsule, Refills: 2    Associated Diagnoses: Hip arthritis      cefadroxil (DURICEF) 500 mg capsule Take 1 Capsule by mouth two (2) times a day. Qty: 14 Capsule, Refills: 0    Associated Diagnoses: Hip arthritis      aspirin delayed-release 81 mg tablet Take 1 Tablet by mouth two (2) times a day. Qty: 60 Tablet, Refills: 1    Associated Diagnoses: Hip arthritis         CONTINUE these medications which have NOT CHANGED    Details   cholecalciferol (Vitamin D3) (1000 Units /25 mcg) tablet Take 1,000 Units by mouth three (3) times daily (with meals). metoprolol succinate (TOPROL-XL) 100 mg tablet Take 100 mg by mouth nightly. levothyroxine (SYNTHROID) 150 mcg tablet Take 150 mcg by mouth nightly. Takes  Synthroid at 2100      ROSUVASTATIN CALCIUM (CRESTOR PO) Take 10 mg by mouth daily (after lunch). Discharge Plan:  The patient will be d/c'd to home, total hip protocol, WBAT. [facility]. Total joint protocol.   Pt safe for homebound transfer, after being cleared by PT, sp Total joint replacement. A walker, bedside commode, and shower chair will be utilized for ADL's. Follow up with Dr. Dianna Panda in 14 days. Call with any questions or concerns.

## 2022-05-25 NOTE — OP NOTES
[facility]  OPERATIVE REPORT    Name:  Nuno Churchill  MR#:   [id number]  :  1945  ACCOUNT #:  [de-identified]  DATE OF SERVICE:  2022    PREOPERATIVE DIAGNOSES:  End-stage arthritis of the right hip with morbid obesity with BMI of 41. POSTOPERATIVE DIAGNOSES:  End-stage arthritis of the right hip with morbid obesity with BMI of 41. PROCEDURES PERFORMED:  Right total hip replacement using the [place] Accolade II system with a 54 mm Trident II Tritanium acetabular shell, a lateralized neutral 36 liner, a size 5 high-offset 127 Accolade II femoral component, and a 36, 0 ceramic femoral head. SURGEON:  Yazmin Agudelo. Mario Drummond MD.    FIRST ASSISTANT:  Sherry Zafar. SECOND ASSISTANT:  Fritz Esteves. ANESTHESIOLOGIST:  Dr. Jamie Unger. ANESTHESIA:  Preoperative nerve block with general.    COMPLICATIONS:  None. SPECIMENS REMOVED:  Femoral head. IMPLANTS:  As above mentioned. ESTIMATED BLOOD LOSS:  325 mL. Sherry Zafar was the first assistant and he assisted with all phases of the surgery commencing with patient positioning, patient prep, patient drape, leg positioning during the surgery, retracting, assisting with the surgery itself, closure, dressing placement, and transfer. DESCRIPTION OF PROCEDURE:  After the anesthetic was successfully induced, leg lengths determined and the patient placed in lateral decubitus position taking great care to pad all sensitive areas and ensure the pelvis was square. Antibiotics confirmed given and a time-out. Standard anterolateral approach. Initially very good hemostasis with electrocautery. IT band delineated and incised sharply. Sciatic nerve identified, protected, and avoided, and the Charnley introduced. At this point, the Anesthesia had poor control of the blood pressure with pressures in the 120s and 130s and we spent a few extra minutes controlling hemostasis, which was very helpful.     Minimus and capsule divided directly over femoral neck carrying back to the tip of the trochanter and anteriorly down  the vastus lateralis. Whole cuff of tissue was taken in one contiguous layer and the lesser trochanter identified. Pin placed in the superior iliac crest for offset and leg length measurement, and after a little bit more capsular release, the hip was dislocated uneventfully. Using appropriate fingerbreadth above the lesser, the femoral neck was divided. Posterior capsule reflected with Esqueda elevator and electrocautery and we then instrumented around the acetabular area protecting the neurovascular bundle. Meticulous debridement of the cup and the medial wall identified through the foveal notch. We medialized appropriately and, at appropriate inclination and anteversion, reamed up to accommodate the cup. We trialed this and I was very pleased with it. We implanted the definitive shell, which seated nicely, and augmented it with a couple of screws. We removed some osteophyte. Aquamantys and Exparel cocktail, then lateralized neutral liner, and we protected it with a Ray-Lyn gauze later to be accounted for. With leg in a sterile leg bag, we lateralized with box osteotome and Leksell rongeur and then broached our way up to accommodate the size 5. We were very snug with this and I made sure that we re-broached 3 and 4, countersunk them slightly, and then the 5 seated nicely. I reduced the hip with the high offset implant and lateralized liner, and it was between the -2.5 and 0 ball. It gave excellent restoration of offset and leg lengths. We referred back to our offset and leg length guide and combined anteversion was excellent. No impingement. Excellent stability. Very good soft tissue balance. Leg lengths very nicely maintained. We implanted the definitive components, re-trialed, happiest with the 0, cleaned the trunnion, impacted it on again reducing the hip.     Antiseptic protocol with Irrisept and routine closure. At the end of the case, instrument, sponge, and needle counts were correct. No complications. The patient tolerated the procedure well. Blood loss was about 325 mL.       Maty Hong MD [id number]  D:  05/24/2022 14:51  T:  05/24/2022 20:29  JOB #:  [id number]

## 2022-05-26 ENCOUNTER — HOME CARE VISIT (OUTPATIENT)
Dept: HOME HEALTH SERVICES | Facility: HOME HEALTH | Age: 77
End: 2022-05-26

## 2022-05-26 ENCOUNTER — HOME CARE VISIT (OUTPATIENT)
Dept: SCHEDULING | Facility: HOME HEALTH | Age: 77
End: 2022-05-26
Payer: MEDICARE

## 2022-05-26 ENCOUNTER — TELEPHONE (OUTPATIENT)
Dept: OTHER | Age: 77
End: 2022-05-26

## 2022-05-26 VITALS
SYSTOLIC BLOOD PRESSURE: 112 MMHG | OXYGEN SATURATION: 95 % | TEMPERATURE: 98.1 F | DIASTOLIC BLOOD PRESSURE: 62 MMHG | RESPIRATION RATE: 17 BRPM | HEART RATE: 51 BPM

## 2022-05-26 PROCEDURE — 400013 HH SOC

## 2022-05-26 PROCEDURE — G0299 HHS/HOSPICE OF RN EA 15 MIN: HCPCS

## 2022-05-26 PROCEDURE — G0151 HHCP-SERV OF PT,EA 15 MIN: HCPCS

## 2022-05-26 PROCEDURE — 3331090002 HH PPS REVENUE DEBIT

## 2022-05-26 PROCEDURE — 400018 HH-NO PAY CLAIM PROCEDURE

## 2022-05-26 PROCEDURE — A6213 FOAM DRG >16<=48 SQ IN W/BDR: HCPCS

## 2022-05-26 PROCEDURE — 3331090001 HH PPS REVENUE CREDIT

## 2022-05-26 NOTE — TELEPHONE ENCOUNTER
Call placed to patient, Id verified x 2. Patient is s/p right total hip replacement with Dr. Brownlee Median 05/24/2022. Patient denies chest pain,shortness of breath, nausea, vomiting , fever or chills. Rick Martin He reports his pain as well controlled at present taking his pain medication only as needed. He reports ambulating ad luke with his rolling walker hourly to prevent stiffness  And to help with pain. Per patient both home health and home physical therapy have been out to his house. He reports his dressing as dry and intact with 20 percent drainage on it that home health is not concerned about. Education provided regarding the importance of icing to assist with pain and swelling. Patient verbalized understanding of all information provided. All questions answered. He will follow up with Dr. Kyleigh Herring for his post op in 2 weeks. Overall he feels he is doing very well.

## 2022-05-26 NOTE — Clinical Note
Dear Dr. Mario Drummond,     This message is to inform you that your patient, PATRICK Mancia, 49,  has been admitted to [facility] services under Home PT today. It was determined that pt will benefit from Home PT for 3w1, 7w1, 5w1 to improve strength and endurance, gait, balance, safety on stairs, and return to PLOF. Any questions please contact me at [phone].     Sincerely,     Tomasa Gomes, PT

## 2022-05-26 NOTE — PROGRESS NOTES
Order for rolling walker sent to [facility] and if approved, [facility] will sent RW to pt's home. Today 5/26/22 at [address] [place] [address] from [facility] called. They received order and contacted pt.   Rolling walker will be delivered to pt tomorrow 1/78/27        VENU Ornelas RN  Care Management  Pager: [phone]

## 2022-05-27 ENCOUNTER — HOME CARE VISIT (OUTPATIENT)
Dept: HOME HEALTH SERVICES | Facility: HOME HEALTH | Age: 77
End: 2022-05-27
Payer: MEDICARE

## 2022-05-27 ENCOUNTER — HOME CARE VISIT (OUTPATIENT)
Dept: SCHEDULING | Facility: HOME HEALTH | Age: 77
End: 2022-05-27
Payer: MEDICARE

## 2022-05-27 VITALS
RESPIRATION RATE: 16 BRPM | TEMPERATURE: 98.6 F | OXYGEN SATURATION: 99 % | SYSTOLIC BLOOD PRESSURE: 117 MMHG | HEART RATE: 58 BPM | DIASTOLIC BLOOD PRESSURE: 64 MMHG

## 2022-05-27 VITALS
OXYGEN SATURATION: 96 % | TEMPERATURE: 97.8 F | SYSTOLIC BLOOD PRESSURE: 120 MMHG | RESPIRATION RATE: 17 BRPM | DIASTOLIC BLOOD PRESSURE: 60 MMHG | HEART RATE: 62 BPM

## 2022-05-27 PROCEDURE — 3331090001 HH PPS REVENUE CREDIT

## 2022-05-27 PROCEDURE — 3331090002 HH PPS REVENUE DEBIT

## 2022-05-27 PROCEDURE — G0151 HHCP-SERV OF PT,EA 15 MIN: HCPCS

## 2022-05-27 NOTE — HOME HEALTH
ADMISSION OASIS  Admission Summary: Mr. Asha Joshua  is a  78year old male being admitted to home health for PT/SN services s/p R THR lateral approach on 5/24/22. Pt was admitted for surgery on 5/24/22 to 5/25/22  for R THR by Dr. Ashley Cervantes at [facility] hospital.    The patient's caregiver/representative present, & able and willing to provide care daily (wife). Patient participated in goal setting and care planning and are agreeable to the care plan. Discharge planning/training was initiated for independence and Outpt. PT as recommended by Dr. Ashley Cervantes. Admission booklet reviewed with patient; including review of rights and responsibilities, discharge/transfer policies, and contact information for , , Medicare, [facility], and outside resources for independence. PMHx: Arrhythmia   AFIB    Chronic right hip pain    Hypertension    Ill-defined condition   Graves Disease     SUBJECTIVE:   Pt reports /10 and increased to /10 over the past 24 hours. REQUIRES CAREGIVER ASSISTANCE FOR: wife assists with meals, ADLs, MARGARITA hose, transportation, medications, ambulation, exercises  PLOF: Pt lives with wife in 2 level home with 3 steps to enter. Pt was indep PTA with functional mobility, indep with ADLs, driving, and medications. Pt wife did the home tasks. DME: SW, cane, CPAP, grab bars  MEDICATIONS REVIEWED AND UPDATED: Medications reconciled and all medications are available in the home this visit. The following education was provided regarding high risk medications ( Oxycodone - acet  mg for pain relief ), medication interactions, and look a like medications: Continue medication as prescribed by MD. Medications are effective at this time. NEXT MD APPT:  with Dr. Ashley Cervantes 56/10/22  ROM: R hip ROM limited to~ 70 degrees of R hip flexion  STRENGTH:  see strength section for details. WOUNDS: R hip incision site with aquacell dressing present. Dressing change to be done on POD #7- 5/31/22 by SN.    Sourav dressing replacements order via [employer] for delivery to pt. Addendum: This PT did dressing change on 5/27/22 and incision stie clean, dry and intact. Aquacell dressing replaced with clean technique. BED MOBILITY: sup <> sit with min A for RLE and also proper technique instruction to get R LE in easier with turning at 45 degree angle. Pt also using Leg  to assist first trial.  Pt performed 2 times - Min A first trial and CGA/min A second trial.    TRANSFERS: sit to stand from bed, chair and toilet with SBA but cues for proper hand placement for safety and proper technique. Increased effort and time to raise off recliner chair (on raised platform) and the use of grab bars off toilet. GAIT: pt ambulated 48 ' x2 with SW and SBA with cues for proper heel to toe gait pattern on R LE,  step to gait pattern and then 5' with cane and grab bars in bathroom to walk to toilet and back out. Pt cued on safety and fall prevention. Pt also cued on proper sequence with SW and encouraged pt to get FWW with ease for progression to step through gait pattern. PT to send order through Dr. Carter Necessary office. STAIRS:  NT today   BALANCE: Fair + with SW. PATIENT EDUCATION PROVIDED THIS VISIT: safety for transfers and proper hand placement,  HEP per handout, walking with heel to toe gait pattern, and step to gait pattern sequence,  deep breathing/PLB, ice pack with barrier between R hip and skin for 20 min on and 40 min off,  clearing objects and clear pathways for fall prevention as well as well lit areas at nighttime, signs and symptoms of infection. HEP consisting of:  1. Walking every 30 min to 1 hour during the day with SW   2. THR protocol per handout given to pt for supine and sitting exercises. Written HEP issued, patient/caregiver verbalized understanding.    Patient Level of understanding of education provided: pt able to return demo HEP for supine and sitting exercise with assistance and cues for proper technique. Patient response to treatment: Pt reported no increase in pain throughout treatment. CONTINUED NEED FOR THE FOLLOWING SKILLS: HH PT is medically necessary to  address R hip pain, decreased ROM of R hip,  decreased strength and endurance,  increased swelling, decreased independence with functional transfers, impaired gait, impaired stair negotiation, and impaired balance in order to improve functional independence, quality of life, return to PLOF, and reduce the risk for falls.   PLAN: 3w1, 7w1, 5w1, until f/u with Dr. Kyleigh Herring on 6/10/22 @ 10 am   DISCHARGE PLANNING DISCUSSED: Discharge to self and family under MD supervision once all goals have been met or patient has reached max potential.

## 2022-05-27 NOTE — Clinical Note
[facility] will see Mr. Saqib Haley over the weekend and to keep. Pt is RTHR lateral approach so has THR precautions. Did a dressing change today and replaced with new aquacell due to drainage noted and >60% saturated dressing. Pic in media. Please cont with strengthening per THR protocol. Progress to standing exercises - no Hip abduction. Please have him cont to practice bed mob, and gait with FWW. He is having trouble with step through gait pattern and just got his FWW today. Please also perform stairs when he is ready at some point next week.      Gerhardt Fleet

## 2022-05-27 NOTE — HOME HEALTH
SUBJECTIVE: \" I can't get this swelling down in my R LE, as soon as I took the MARGARITA HOSE last night my foot started to swell and was causing pain. \" PT cont to recommend to not wear MARGARITA at night and let LE breath, but pt may choose to try a night with keeping it on. \"I had this problem with my R foot before the surgery with swelling in the evening and I use to wear the MARGARITA hose at times. Patient reports 0-7/10 in R foot and 0-5/10 pain in R hip. Pt denies falls. CAREGIVER INVOLVEMENT/ASSISTANCE NEEDED FOR: wife assists with meals, ADLs, transportation, exercises, ambulation, MARGARITA HOSE  HOME HEALTH SUPPLIES BY TYPE AND QUANTITY ORDERED/DELIVERED THIS VISIT INCLUDE: Aquacell dressing reapplied to R hip due to saturation and drainage. OBJECTIVE:  See interventions. PATIENT RESPONSE TO TREATMENT:  Pt tolerated treatment well and no increase in pain noted. PATIENT LEVEL OF UNDERSTANDING OF EDUCATION PROVIDED: PT reeducated pt via verbal and demo cues on proper technique for bed mob for sup <> sit. ASSESSMENT OF PROGRESS TOWARD GOALS: Pt now has FWW for ambulation that was delivered today. Pt needed education on proper use, correct sequencing and demo on how to perform step through gait pattern. Pt R LE improving with strength as noted with AROM of all exercises today but cues on proper form and to hold for 5 sec contractions. Bed mob still a struggle for pt and needing demo and verbal cues on proper technique and getting into the bed via sit to sup. Pt will need cont practice. CONTINUED NEED FOR THE FOLLOWING SKILLS:  HH PT is medically necessary to address pain, decreased ROM of R hip,  decreased strength, increased swelling, decreased independence with functional transfers, impaired gait, impaired stair negotiation, and impaired balance in order to improve functional independence, quality of life, return to PLOF, and reduce the risk for falls.   PLAN FOR NEXT VISIT: Cont PT for bed mob, transfers, ambulation with FWW with proper sequencing as well as strengthening of R LE.   THE FOLLOWING DISCHARGE PLANNING WAS DISCUSSED WITH THE PATIENT/CAREGIVER: Discharge to self and family under MD supervision once all goals have been met or patient has reached max potential

## 2022-05-27 NOTE — HOME HEALTH
Skilled services/Home bound verification: the primary reason for home health care. PDGM Dx: RIGHT TOTAL HIP ARTHROPLASTY  [address]      Skilled Reason for admission/summary of clinical condition:  Post op management. This patient is homebound for the following reasons Requires considerable and taxing effort to leave the home  and Requires the assistance of 1 or more persons to leave the home . Caregiver: patients cg is spouse and is available as needed or assistance with IADL's, ADL's, meal prep, medication management, and taking patient to all doctors appointments. Medications reconciled and all medications are available in the home this visit. The following education was provided regarding medications: All patient medications were reviewed, including side effects, safety, time to administer, purposes, dosages, and frequencies. Medications  are effective at this time. High risk medication teaching regarding anticoagulants, hyperglycemic agents or opiod narcotics performed (specify) patient aware of percocet medication side effects and use    Dr Dianna Panda notified of any discrepancies/look a like medications/medication interactions none    Home health supplies by type and quantity ordered/delivered this visit include: NA    Patient education provided this visit to include: see interventions. Patient level of understanding of education provided: verbalized understanding    Sharps Education Provided: NA  Patient response to procedure performed:  NA    Home exercise program/Homework provided: activity as tolerated, trying to get physical activity 4-5 x weekly. stopping activity if causing shortness of breath or chest pain, dizziness or weakness. Pt/Caregiver instructed on plan of care and are agreeable to plan of care at this time.       Physician Dr Dianna Panda notified of patient admission to home health and plan of care including anticipated frequency of 1w1,2w1,1w2,2prn and treatments/interventions/modalities of RTHR    Discharge planning discussed with patient and caregiver. Discharge planning as follows:  Patient will be discharged once education is complete, and pt is medically stable. Pt/Caregiver did verbalize understanding of discharge planning. Next MD appointment Greta 10 with Dr Jimmy Cook MD/NP/PA. Patient/caregiver encouraged/instructed to keep appointment as lack of follow through with physician appointment could result in discontinuation of home care services for non-compliance.

## 2022-05-28 ENCOUNTER — HOME CARE VISIT (OUTPATIENT)
Dept: SCHEDULING | Facility: HOME HEALTH | Age: 77
End: 2022-05-28
Payer: MEDICARE

## 2022-05-28 VITALS
SYSTOLIC BLOOD PRESSURE: 140 MMHG | DIASTOLIC BLOOD PRESSURE: 80 MMHG | TEMPERATURE: 97 F | HEART RATE: 66 BPM | RESPIRATION RATE: 16 BRPM | OXYGEN SATURATION: 97 %

## 2022-05-28 PROCEDURE — 3331090001 HH PPS REVENUE CREDIT

## 2022-05-28 PROCEDURE — 3331090002 HH PPS REVENUE DEBIT

## 2022-05-28 PROCEDURE — G0157 HHC PT ASSISTANT EA 15: HCPCS

## 2022-05-28 NOTE — HOME HEALTH
Patient's Subjective: I am great ! Last night was one of the best night so far. I was able to sleep better. The foot didn't swell as much. Falls since last session: No  Pain/Discomfort ? Yes- see pain page  New Meds: No  .  Caregiver involvement/assistance needed for: The patient's caregiver assists with ADL's, meals, meds, transportation  . Home health supplies by type and quantity ordered/delivered this visit include:  none  . Objective:  See interventions. Patient response to treatment:   Pt with increased fatigue and sx with functional activities. Patient level of understanding of education provided:  -The patient and his wife has been instructed in the following medicine education:   If there are any medicines/supplements (by MD order or OTC) added or DC 'd let [facility] staff know and have bottles/packages available. It is necessary to keep an accurate record of meds to avoid any medication errors. They report understanding.   -  Keep incision dry as it can cause infection. I recommended several different ways to assist with keeping it dry. He reported understanding.  - Correct sequencing with gait and keeping the wheels on the floor vs picking it up. - Safe bed transfers to decrease sx and exertion. with Fair return demonstration. Assess of progress towards goals:   No falls to date, per pt report. Sx fluctuate. He reported his sleep in improving. Some improved gait by the end of the session. Improved bed transfer by the end of the session. He conts with increased exertion to complete tasks. Continued need for the following skills: Home Health PT is medically necessary to address the following:  pain, decreased ROM, decreased strength, increased edema, impaired bed mobility, decreased Ind with functional transfers, impaired gait, impaired stair negotiation and impaired stability to decrease sx, improve functional Ind, quality of life, reduce the fall risk.      Plan for next visit: seated exercises, cont with gait training, cont with bed transfer and mobility training    The following discharge was discussed with the patient/caregiver :   Estimated PT DC date 6/10  [facility] :  Yes

## 2022-05-29 ENCOUNTER — HOME CARE VISIT (OUTPATIENT)
Dept: SCHEDULING | Facility: HOME HEALTH | Age: 77
End: 2022-05-29
Payer: MEDICARE

## 2022-05-29 VITALS
HEART RATE: 88 BPM | DIASTOLIC BLOOD PRESSURE: 80 MMHG | OXYGEN SATURATION: 97 % | RESPIRATION RATE: 16 BRPM | SYSTOLIC BLOOD PRESSURE: 130 MMHG

## 2022-05-29 PROCEDURE — 3331090002 HH PPS REVENUE DEBIT

## 2022-05-29 PROCEDURE — 3331090001 HH PPS REVENUE CREDIT

## 2022-05-29 PROCEDURE — G0157 HHC PT ASSISTANT EA 15: HCPCS

## 2022-05-30 ENCOUNTER — HOME CARE VISIT (OUTPATIENT)
Dept: SCHEDULING | Facility: HOME HEALTH | Age: 77
End: 2022-05-30
Payer: MEDICARE

## 2022-05-30 VITALS
SYSTOLIC BLOOD PRESSURE: 140 MMHG | HEART RATE: 80 BPM | RESPIRATION RATE: 16 BRPM | DIASTOLIC BLOOD PRESSURE: 80 MMHG | OXYGEN SATURATION: 98 % | TEMPERATURE: 96.8 F

## 2022-05-30 PROCEDURE — 3331090002 HH PPS REVENUE DEBIT

## 2022-05-30 PROCEDURE — G0157 HHC PT ASSISTANT EA 15: HCPCS

## 2022-05-30 PROCEDURE — 3331090001 HH PPS REVENUE CREDIT

## 2022-05-30 NOTE — HOME HEALTH
Patient's Subjective: The patient reports finally having a BM. He is not on the narcotics. He is trying to only use Tylenol and the sx are under control. Falls since last session: No  Pain/Discomfort ? Yes- see pain page  New Meds: No    .  Caregiver involvement/assistance needed for: The patient's caregiver assists with meals, meds, transportation when he has f/u appointment. chores, HEP, some ADL's  . Home health supplies by type and quantity ordered/delivered this visit include:  None  . Objective:  See interventions. Patient response to treatment:   Fair tolerance to new standing exercises  Some decrease in fatigue with bed transfer and mobility. Patient level of understanding of education provided:  - New exercises in standing with fair return demonstration.  -He conts with low pain levels and no more need of pain meds except Tylenol. Assess of progress towards goals:   No falls to date. Low pain levels. Compliant and consistent with HEP. Improved transfers, remington the bed transfer. Gait slowly improving. He conts with fear of WB on surgical limb which is hindering normalized gait pattern. No obvious s/sx of infection. He verbalized safe amb practices in the evenings including use of night lights, no throw rugs. he does not have episodes of lightheaded or dizziness, per his report. Continued need for the following skills: Home Health PT is medically necessary to address the following:  pain, decreased ROM, decreased strength, increased edema, impaired bed mobility, decreased Ind with functional transfers, impaired gait, impaired stair negotiation and impaired stability to decrease sx, improve functional Ind, quality of life, reduce the fall risk. Plan for next visit:  weight shifting to R LE to increase tolerance to SLS and decrease his fear of WB on the surgical limb.          The following discharge was discussed with the patient/caregiver :     Estimated PT DC date 6/10  [facility] HEART needed :  Yes

## 2022-05-30 NOTE — HOME HEALTH
Patient's Subjective: He reports doing all of his exercises at one time yesterday, increasing the tightness he feels in the L hip area. Falls since last session: No   Pain/Discomfort ? Yes- see pain page  New Meds: No  .  Caregiver involvement/assistance needed for: The patient's caregiver assists with meals, transportation, HEP assist, some ADL\"s  . Home health supplies by type and quantity ordered/delivered this visit include: None  . Objective:  See interventions. Patient response to treatment:    Some increase in sx with exercises. , but he denies a big increase in exertion. Patient level of understanding of education provided:  -   New weight shifting exercises with good return demonstration.  - He ws able to return demonstrate the standing exercises introduced last session with good techniques. - Improvement in gait technique today observed. Assess of progress towards goals:  No falls to date. Decreased overall symptoms. Improving gait and exercise tolerance and performance. No s/sx of infection, but conts with some drainage. Continued need for the following skills: Home Health PT is medically necessary to address the following:  pain, decreased ROM, decreased strength, increased edema, impaired bed mobility, decreased Ind with functional transfers, impaired gait, impaired stair negotiation and impaired stability to decrease sx, improve functional Ind, quality of life, reduce the fall risk. Plan for next visit:  Cont with gait training to normalize gait.     The following discharge was discussed with the patient/caregiver :     Estimated PT DC date 6/10 with reassessment 6/1  [address] needed : Yes

## 2022-05-31 ENCOUNTER — HOME CARE VISIT (OUTPATIENT)
Dept: SCHEDULING | Facility: HOME HEALTH | Age: 77
End: 2022-05-31
Payer: MEDICARE

## 2022-05-31 VITALS
RESPIRATION RATE: 16 BRPM | OXYGEN SATURATION: 98 % | DIASTOLIC BLOOD PRESSURE: 80 MMHG | TEMPERATURE: 96.8 F | HEART RATE: 69 BPM | SYSTOLIC BLOOD PRESSURE: 140 MMHG

## 2022-05-31 PROCEDURE — G0299 HHS/HOSPICE OF RN EA 15 MIN: HCPCS

## 2022-05-31 PROCEDURE — 3331090001 HH PPS REVENUE CREDIT

## 2022-05-31 PROCEDURE — G0157 HHC PT ASSISTANT EA 15: HCPCS

## 2022-05-31 PROCEDURE — 3331090002 HH PPS REVENUE DEBIT

## 2022-06-01 ENCOUNTER — HOME CARE VISIT (OUTPATIENT)
Dept: SCHEDULING | Facility: HOME HEALTH | Age: 77
End: 2022-06-01
Payer: MEDICARE

## 2022-06-01 VITALS
RESPIRATION RATE: 18 BRPM | TEMPERATURE: 96.8 F | SYSTOLIC BLOOD PRESSURE: 140 MMHG | DIASTOLIC BLOOD PRESSURE: 80 MMHG | HEART RATE: 56 BPM | OXYGEN SATURATION: 98 %

## 2022-06-01 VITALS
HEART RATE: 78 BPM | DIASTOLIC BLOOD PRESSURE: 72 MMHG | OXYGEN SATURATION: 98 % | SYSTOLIC BLOOD PRESSURE: 130 MMHG | RESPIRATION RATE: 18 BRPM | TEMPERATURE: 97.7 F

## 2022-06-01 PROCEDURE — 3331090001 HH PPS REVENUE CREDIT

## 2022-06-01 PROCEDURE — 3331090002 HH PPS REVENUE DEBIT

## 2022-06-01 PROCEDURE — G0157 HHC PT ASSISTANT EA 15: HCPCS

## 2022-06-01 NOTE — HOME HEALTH
Patient's Subjective: He reports continued difficulty with painful movement of the R LE. He reports the WB without movement is not the primary concern. Falls since last session: No  Pain/Discomfort ? Yes- see pain page  New Meds: No  Upcoming MD appointments: 6/10/2022 10:30 AM Sorin Cordova PA-C . Caregiver involvement/assistance needed for: The patient's caregiver assists with transportation, some assist with HEP, meals, chores, some ADL assist  .  Home health supplies by type and quantity ordered/delivered this visit include:  None  . Objective:  See interventions. Patient response to treatment:    He continues with increase sx R LE with movement in WB and with amb. He can tolerate the exercises with Fair tolerance. Patient level of understanding of education provided:  - Re education for the need to progress with WB to increase tolerance to SLS and gait. he reports understanding but conts with difficulty.  -Education for increasing frequency of cold modalities with reports of understanding.  - Re education for exercises techqniques with god return demonstration. Assess of progress towards goals:   No falls to date. No obvious s/sx of infection present with bandage intact. Compliant with HEP. Gait improving with fluidity of movement and alex. Good stability with amb level surfaces. pain remains with toe off phase of gait. Continued need for the following skills: Home Health PT is medically necessary to address the following:  pain, decreased strength, increased edema R thigh inferior aspect, decreased Ind with functional transfers, impaired gait, impaired stair negotiation and impaired stability to decrease sx, improve functional Ind, quality of life, reduce the fall risk.      Plan for next visit: reassessment    The following discharge was discussed with the patient/caregiver :     Estimated PT DC date  6/10  [facility] HEART needed by nursing as they are scheduled to DC 6/16

## 2022-06-02 ENCOUNTER — HOME CARE VISIT (OUTPATIENT)
Dept: SCHEDULING | Facility: HOME HEALTH | Age: 77
End: 2022-06-02
Payer: MEDICARE

## 2022-06-02 VITALS
OXYGEN SATURATION: 96 % | TEMPERATURE: 96.8 F | RESPIRATION RATE: 17 BRPM | DIASTOLIC BLOOD PRESSURE: 80 MMHG | SYSTOLIC BLOOD PRESSURE: 140 MMHG | HEART RATE: 58 BPM

## 2022-06-02 PROCEDURE — 3331090001 HH PPS REVENUE CREDIT

## 2022-06-02 PROCEDURE — G0299 HHS/HOSPICE OF RN EA 15 MIN: HCPCS

## 2022-06-02 PROCEDURE — G0151 HHCP-SERV OF PT,EA 15 MIN: HCPCS

## 2022-06-02 PROCEDURE — 3331090002 HH PPS REVENUE DEBIT

## 2022-06-02 NOTE — HOME HEALTH
PT REASSESSMENT/SUPERVISORY VISIT-  Ashleyleigh GRIGSBY present for reassessment   SUBJECTIVE: \"I am still having all this pain when I put my R toes down and take a step through. \"  PT discussed with pt that this is normal in healing process still close to surgery date. Pt also reports \"I am not really using Oxycodone any more just the Tylenol, and I am not needing that as much. I didn't take it last night and I slept good. \"   CAREGIVER ASSISTANCE NEEDED FOR: wife assists with meals, transportation, medications, ADLs  MEDICATIONS REVIEWED AND UPDATED: Medications reconciled and all medications are available in the home this visit. The following education was provided regarding medications, medication interactions, and look a like medications: Continue medication as prescribed by MD. Medications are effective at this time. WOUNDS: R hip incision site dressing intact, dry, clean and intact. BED MOBILITY:indep    see goals section for details  TRANSFERS: SBA/mod indep   see goals section for details  GAIT TRAINING: see goals section for details  STAIRS: see goals section for details  BALANCE: Fair + to good - balance on his FWW. Is ready to progress to cane over the next week. PATIENT/CAREGIVER EDUCATION PROVIDED THIS VISIT: Sequence with cane and steps, normal healing process post surgery, use of pain meds only with severe pain at this time (7-10) otherwise Tylenol is pain med of choice  HEP consisting of:  supine, seated, standing exercise per handouts given  Patient level of understanding of education provided: Pt verbalized and return demo stair training but up to 75% cues for proper and safe technique. Patient response to treatment:  Pt had no c/o pain throughtout treatment  ASSESSMENT AND PROGRESS TOWARD GOALS: Pt has made good gains since [facility]. Pt is now indep with bed mob, as pt needed min A for R LE on SOC, and also SBA/mod indep with all household transfer, and was SBA at [facility].   Pt is ambulating with FWW now vs SW on [facility] and is able to perform step through gait pattern throughout home with fluidity at times. Pt is having cont R LE pain with toe off , but discussed with pt that this is normal healing process. Pt began stair training today and needed SBA and 75% on safety, sequence, and technique. More training needed on stairs, with progression to cane for gait training and also for community distance with appropriate device, and car transfer training. CONTINUED NEED FOR THE FOLLOWING SKILLS: HH PT is medically necessary to address R hip pain, decreased ROM, decreased strength and endurance, increased swelling,  decreased independence with functional transfers, impaired gait, impaired stair negotiation, and impaired balance in order to improve functional independence, quality of life, return to PLOF, reduce the risk for falls, and reduce pain. PLAN: To cont PT for  training on stairs, progression to cane for gait training, and also for community distance with appropriate device, car transfer training.    DISCHARGE PLANNING DISCUSSED: Discharge to self and family under MD supervision once all goals have been met or patient has reached maximum potential.

## 2022-06-03 ENCOUNTER — HOME CARE VISIT (OUTPATIENT)
Dept: SCHEDULING | Facility: HOME HEALTH | Age: 77
End: 2022-06-03
Payer: MEDICARE

## 2022-06-03 PROCEDURE — 3331090002 HH PPS REVENUE DEBIT

## 2022-06-03 PROCEDURE — G0157 HHC PT ASSISTANT EA 15: HCPCS

## 2022-06-03 PROCEDURE — 3331090001 HH PPS REVENUE CREDIT

## 2022-06-04 ENCOUNTER — HOME CARE VISIT (OUTPATIENT)
Dept: SCHEDULING | Facility: HOME HEALTH | Age: 77
End: 2022-06-04
Payer: MEDICARE

## 2022-06-04 VITALS
DIASTOLIC BLOOD PRESSURE: 78 MMHG | OXYGEN SATURATION: 98 % | HEART RATE: 64 BPM | SYSTOLIC BLOOD PRESSURE: 140 MMHG | TEMPERATURE: 97.8 F

## 2022-06-04 PROCEDURE — 3331090001 HH PPS REVENUE CREDIT

## 2022-06-04 PROCEDURE — G0157 HHC PT ASSISTANT EA 15: HCPCS

## 2022-06-04 PROCEDURE — 3331090002 HH PPS REVENUE DEBIT

## 2022-06-04 NOTE — HOME HEALTH
S: Pt denies falls or changes in medication, c/o 3/10 right hip pain  CAREGIVER PARTICIPATION: Pt is performing ADLs Ind   Wound dressed no drainage no s/s of infection  O: TRANSFERS: Ind sit to stand transfers from raised recliner   THEREX/HEP: Pt instructed in seated ankle pumps, knee ext x 15 reps x 1 set, standing heel toe raises, HS curls to bilateral LE, marching in place, hip forward flexion, hip extension  x 20 reps x 1 set VCs to improve tech  GAIT: Amb tng outside on uneven surfaces with FWW/SBA pt amb with slightly flexed posture, good foot clearance, decreaesd stride length and hip flexion VCs to slow alex   STAIRS: Stair negotiates tng front steps to exit/enter home w/ SPC and rail VCs for sequencing   EDUCATION: Pt instructed to perform HEP 3x day, instructed in pain mangement instructed to use ice more often pt has been using ice a few times a day, reviewed hip precautions   A: RESPONSE TO EDUCATION: Pt able to return demonstrate HEP, able to teach back hip precautions, spouse will assist with more frequent use of ice   RESPONSE TO TX: Pt tolerated increaed amb dist outside on unevn surfaces, improved quality of gait following tng, safe stair negotiation with use of SPC/rail no change in pain following tx session pt progressing well toward established goals   Patient requires skilled PT for instruction in strengthening, balance and coordination to improve strength and facilitate increased independence with functional mobility.    P: Cont with daily PT

## 2022-06-05 ENCOUNTER — HOME CARE VISIT (OUTPATIENT)
Dept: SCHEDULING | Facility: HOME HEALTH | Age: 77
End: 2022-06-05
Payer: MEDICARE

## 2022-06-05 PROCEDURE — G0157 HHC PT ASSISTANT EA 15: HCPCS

## 2022-06-05 PROCEDURE — 3331090002 HH PPS REVENUE DEBIT

## 2022-06-05 PROCEDURE — 3331090001 HH PPS REVENUE CREDIT

## 2022-06-06 ENCOUNTER — HOME CARE VISIT (OUTPATIENT)
Dept: SCHEDULING | Facility: HOME HEALTH | Age: 77
End: 2022-06-06
Payer: MEDICARE

## 2022-06-06 VITALS
TEMPERATURE: 97.7 F | OXYGEN SATURATION: 96 % | DIASTOLIC BLOOD PRESSURE: 76 MMHG | RESPIRATION RATE: 14 BRPM | HEART RATE: 63 BPM | SYSTOLIC BLOOD PRESSURE: 123 MMHG | TEMPERATURE: 98.2 F | SYSTOLIC BLOOD PRESSURE: 130 MMHG | DIASTOLIC BLOOD PRESSURE: 73 MMHG | OXYGEN SATURATION: 98 % | RESPIRATION RATE: 14 BRPM | HEART RATE: 53 BPM

## 2022-06-06 VITALS
RESPIRATION RATE: 17 BRPM | SYSTOLIC BLOOD PRESSURE: 108 MMHG | TEMPERATURE: 97.7 F | HEART RATE: 57 BPM | OXYGEN SATURATION: 99 % | DIASTOLIC BLOOD PRESSURE: 68 MMHG

## 2022-06-06 PROCEDURE — 3331090001 HH PPS REVENUE CREDIT

## 2022-06-06 PROCEDURE — G0151 HHCP-SERV OF PT,EA 15 MIN: HCPCS

## 2022-06-06 PROCEDURE — 3331090002 HH PPS REVENUE DEBIT

## 2022-06-06 NOTE — HOME HEALTH
Subjective: Patient states that he has been completing the HEP as discussed last visit and he feels like he was able to complete all the exercises well without any pain. Objective: Skilled home health physical therapy interventions completed today listed in care plan section. Interventions performed today to assist in return to prior level of function, address deficits as apparent upon time of initial evaluation, return to community and personal activities, and progress further towards goals as previously established in plan of care. There are not any changes to medications upon timing of this visit. Home health supplies were not ordered/delivered today. Skilled exercises completed today include long arc quads eccentric focus, standing heel/toe raises doorframe, standing hip flexion/extension right lower extremity doorway, anterior/posterior can step overs right only standing all x10-20 as well as lateral stepping 5ft x10 with supervision to stand by assist needed for safety and constant verbal cueing/instruction for form to ensure maximum effectiveness and proper form. Assessment:  Patient is progressing towards goals as previously established in POC with skilled home health physical therapy services at this time as made apparent by ability to go up/down the stairs today outside with folded walker and handrail for support as well as navigating uneven terrain outside today. Again today displayed improved swing phase form with less toeing out and reduced circumduction after can step over exercise. Family/caregiver spouse involvement is present/set-up at this point in time and assists with meals, ADLs, and transport. Hip weakness and limited stability limits ability to complete standing exercises well standing on right lower extremity at this time.     Plan:  Discharge planning discussed at this visit regarding eventual discharge once patient has met goals and/or met maximum benefit from home health skilled PT services with patient understanding and agreeable at this time. Continued need for skilled home health PT at this time to address deficits, reduce risk of falls, and obtain goals as previously established per plan of care. Planned discharge and follow up with surgeon this week. Continue with outside ambulation as weather permits.

## 2022-06-06 NOTE — HOME HEALTH
Subjective: Patient relays that he has been trying to complete his given HEP as instructed but has pain with the kick backwards standing on right lower extremity. He states that he is icing regularly. Objective: Skilled home health physical therapy interventions completed today listed in care plan section. Interventions performed today to assist in return to prior level of function, address deficits as apparent upon time of initial evaluation, return to community and personal activities, and progress further towards goals as previously established in plan of care. There are not any changes to medications upon timing of this visit. Home health supplies were not ordered/delivered today. Skilled exercises completed today include long arc quads eccentric focus, standing heel/toe raises doorframe, standing hip flexion/extension right lower extremity doorway, anterior/posterior can step overs right only standing all x10-20 as well as lateral stepping 5ft x10 with supervision to stand by assist needed for safety and constant verbal cueing/instruction for form to ensure maximum effectiveness and proper form. Assessment:  Patient is progressing towards goals as previously established in POC with skilled home health physical therapy services at this time as made apparent by improving overall mobility navigating his home. Displayed improved swing phase form with less toeing out and reduced circumduction after can step over exercise. Family/caregiver spouse involvement is present/set-up at this point in time and assists with meals, ADLs, and transport. Plan:  Discharge planning discussed at this visit regarding eventual discharge once patient has met goals and/or met maximum benefit from home health skilled PT services with patient understanding and agreeable at this time.  Continued need for skilled home health PT at this time to address deficits, reduce risk of falls, and obtain goals as previously established per plan of care. Attempt outside ambulation uneven terrain and train in folded walker use on stairs.

## 2022-06-07 ENCOUNTER — HOME CARE VISIT (OUTPATIENT)
Dept: SCHEDULING | Facility: HOME HEALTH | Age: 77
End: 2022-06-07
Payer: MEDICARE

## 2022-06-07 VITALS
HEART RATE: 61 BPM | TEMPERATURE: 97.9 F | DIASTOLIC BLOOD PRESSURE: 72 MMHG | SYSTOLIC BLOOD PRESSURE: 120 MMHG | OXYGEN SATURATION: 96 % | RESPIRATION RATE: 17 BRPM

## 2022-06-07 PROCEDURE — G0151 HHCP-SERV OF PT,EA 15 MIN: HCPCS

## 2022-06-07 PROCEDURE — 3331090001 HH PPS REVENUE CREDIT

## 2022-06-07 PROCEDURE — 3331090002 HH PPS REVENUE DEBIT

## 2022-06-07 NOTE — HOME HEALTH
SUBJECTIVE: \"I had a good weekend and I think I have gone beyond the point of pain when I step off the R foot. \"  Patient reports R hip  pain (see pain assessment), and no falls. CAREGIVER INVOLVEMENT/ASSISTANCE NEEDED FOR: wife assists with meals, ADLs, transportation, exercises, ambulation   HOME HEALTH SUPPLIES BY TYPE AND QUANTITY ORDERED/DELIVERED THIS VISIT INCLUDE: none  OBJECTIVE:  See interventions. PATIENT RESPONSE TO TREATMENT:  Pt had no c/o pain throughtout treatment  PATIENT LEVEL OF UNDERSTANDING OF EDUCATION PROVIDED: cues for gait training, stair training, and balance challenges   ASSESSMENT OF PROGRESS TOWARD GOALS:   CONTINUED NEED FOR THE FOLLOWING SKILLS:  HH PT is medically necessary to  ddress pain, decreased ROM ofR hip,  decreased strength and endurance,  impaired gait, impaired stair negotiation, and impaired balance in order to improve functional independence, quality of life, return to PLOF, and reduce the risk for falls. PLAN FOR NEXT VISIT: Cont PT for gait training with cane, and balance challenges as well as car transfers.    THE FOLLOWING DISCHARGE PLANNING WAS DISCUSSED WITH THE PATIENT/CAREGIVER: Discharge to self and family under MD supervision once all goals have been met or patient has reached max potential.

## 2022-06-08 ENCOUNTER — HOME CARE VISIT (OUTPATIENT)
Dept: SCHEDULING | Facility: HOME HEALTH | Age: 77
End: 2022-06-08
Payer: MEDICARE

## 2022-06-08 VITALS
TEMPERATURE: 97.8 F | OXYGEN SATURATION: 99 % | DIASTOLIC BLOOD PRESSURE: 62 MMHG | HEART RATE: 58 BPM | RESPIRATION RATE: 17 BRPM | SYSTOLIC BLOOD PRESSURE: 100 MMHG

## 2022-06-08 PROCEDURE — G0151 HHCP-SERV OF PT,EA 15 MIN: HCPCS

## 2022-06-08 PROCEDURE — 3331090002 HH PPS REVENUE DEBIT

## 2022-06-08 PROCEDURE — 3331090001 HH PPS REVENUE CREDIT

## 2022-06-08 NOTE — HOME HEALTH
SUBJECTIVE: \"I' am doing well with the cane. I have been up doing some of my exercises this morning and pretty much using the cane at all times. \"  Patient reports R hip pain but minimal and more soreness. stiffness than pain. (see pain assessment), and no falls. CAREGIVER INVOLVEMENT/ASSISTANCE NEEDED FOR: wife assists with meals, ADLs, transportation, exercises, ambulation   HOME HEALTH SUPPLIES BY TYPE AND QUANTITY ORDERED/DELIVERED THIS VISIT INCLUDE: none   OBJECTIVE: See interventions. PATIENT RESPONSE TO TREATMENT: Pt had no c/o pain throughtout treatment, but some noticeable fatigue from longer community distances today with cane. PATIENT LEVEL OF UNDERSTANDING OF EDUCATION PROVIDED: pt able to demonstrate safety and correct sequence on steps today and also updated his exercises chart to reflect new exercises and balance challenges that PT had given the day prior. No difficulties with f/t per pt report. ASSESSMENT OF PROGRESS TOWARD GOALS: Pt is cont to make good gains towards all LTGs. Pt today was able to perform car transfer with SBA/supervision and needed no help to get RLE into vehicle, which pt stated he or his wife had to always assist that leg into car. Pt also walked longer distances and on incline and decline today on driveway with SPC and SBA/supervision. Pt with some toeing out noted today on LLE and pt stated possible due to counter balance him and also due to wearing the abductor wedge at night may have caused this slightly. Because of hip precautions PT did not retrain internal rotation of David hips at this time and just left him to toe at at this time. Pt cont to be movitated and compliant with all mobility and exercises that PT has given. Pt is due for appt with Dr. Bonnie Stanley on Friday 6/10/22 and feel pt is ready and in need of transition to outpt PT. Discussed this with pt and he is to discuss with MD.  PT will f/u in afternoon on Friday for DC.      CONTINUED NEED FOR THE FOLLOWING SKILLS: HH PT is medically necessary to ddress pain, decreased ROM of R hip, decreased strength and endurance, impaired gait, impaired stair negotiation, and impaired balance in order to improve functional independence, quality of life, return to PLOF, and reduce the risk for falls. PLAN FOR NEXT VISIT: DC next visit after pt has f/u with Dr. Chang Neal office.   PT recommended Outpt PT services to pt and recommended he get script of ask MD for referral.    THE FOLLOWING DISCHARGE PLANNING WAS DISCUSSED WITH THE PATIENT/CAREGIVER: Discharge to self and family under MD supervision once all goals have been met or patient has reached max potential.

## 2022-06-08 NOTE — HOME HEALTH
SUBJECTIVE: \"I've have done well with the cane yesterday and this am, but I am sore. \" Pt did listen to PT recommendations and did alternate cane and FWW for ambulation hourly. Patient reports R hip pain (see pain assessment), and no falls. CAREGIVER INVOLVEMENT/ASSISTANCE NEEDED FOR: wife assists with meals, ADLs, transportation, exercises, ambulation   HOME HEALTH SUPPLIES BY TYPE AND QUANTITY ORDERED/DELIVERED THIS VISIT INCLUDE: none   OBJECTIVE: See interventions. PATIENT RESPONSE TO TREATMENT: Pt had no c/o pain throughtout treatment   PATIENT LEVEL OF UNDERSTANDING OF EDUCATION PROVIDED: pt able to demonstrate safe step through gait pattern with cane today and cont to verbalize compliance with HEP daily. ASSESSMENT OF PROGRESS TOWARD GOALS: Pt is cont to make good strides towards least AD with gait and indep /mod indep with all mobility and strength of RLE. Pt is walking  even and uneven surfaces with SPC with step through gait pattern and also climbing stairs with SBA and cane. Pt still with some minor c/o R hip pain with toe off but not limiting mobility at this time. Pt also with decresased balance which poses pt a risk for falls, therefore will cont to benefit from Home PT for cont education and training on balance, and gait on uneven surfaces to return back to community ambulator. CONTINUED NEED FOR THE FOLLOWING SKILLS: HH PT is medically necessary to ddress pain, decreased ROM of R hip, decreased strength and endurance, impaired gait, impaired stair negotiation, and impaired balance in order to improve functional independence, quality of life, return to PLOF, and reduce the risk for falls. PLAN FOR NEXT VISIT: Cont PT for gait training with cane on uneven surfaces,  and balance challenges with less UE suuport, and  car transfers.    THE FOLLOWING DISCHARGE PLANNING WAS DISCUSSED WITH THE PATIENT/CAREGIVER: Discharge to self and family under MD supervision once all goals have been met or patient has reached max potential.

## 2022-06-09 ENCOUNTER — HOME CARE VISIT (OUTPATIENT)
Dept: SCHEDULING | Facility: HOME HEALTH | Age: 77
End: 2022-06-09
Payer: MEDICARE

## 2022-06-09 PROCEDURE — 3331090001 HH PPS REVENUE CREDIT

## 2022-06-09 PROCEDURE — 3331090002 HH PPS REVENUE DEBIT

## 2022-06-09 PROCEDURE — G0299 HHS/HOSPICE OF RN EA 15 MIN: HCPCS

## 2022-06-10 ENCOUNTER — TELEPHONE (OUTPATIENT)
Dept: PHYSICAL THERAPY | Age: 77
End: 2022-06-10

## 2022-06-10 ENCOUNTER — HOME CARE VISIT (OUTPATIENT)
Dept: SCHEDULING | Facility: HOME HEALTH | Age: 77
End: 2022-06-10
Payer: MEDICARE

## 2022-06-10 ENCOUNTER — OFFICE VISIT (OUTPATIENT)
Dept: ORTHOPEDIC SURGERY | Age: 77
End: 2022-06-10
Payer: MEDICARE

## 2022-06-10 VITALS
OXYGEN SATURATION: 96 % | DIASTOLIC BLOOD PRESSURE: 62 MMHG | RESPIRATION RATE: 17 BRPM | TEMPERATURE: 97.7 F | SYSTOLIC BLOOD PRESSURE: 140 MMHG | HEART RATE: 73 BPM

## 2022-06-10 VITALS — WEIGHT: 303 LBS | TEMPERATURE: 98.4 F | HEIGHT: 72 IN | BODY MASS INDEX: 41.04 KG/M2

## 2022-06-10 DIAGNOSIS — Z96.641 STATUS POST RIGHT HIP REPLACEMENT: Primary | ICD-10-CM

## 2022-06-10 PROCEDURE — 3331090002 HH PPS REVENUE DEBIT

## 2022-06-10 PROCEDURE — G0151 HHCP-SERV OF PT,EA 15 MIN: HCPCS

## 2022-06-10 PROCEDURE — 99024 POSTOP FOLLOW-UP VISIT: CPT | Performed by: PHYSICIAN ASSISTANT

## 2022-06-10 PROCEDURE — 3331090001 HH PPS REVENUE CREDIT

## 2022-06-10 PROCEDURE — 3331090003 HH PPS REVENUE ADJ

## 2022-06-10 RX ORDER — AMOXICILLIN 500 MG/1
CAPSULE ORAL
Qty: 12 CAPSULE | Refills: 2 | Status: SHIPPED | OUTPATIENT
Start: 2022-06-10 | End: 2022-06-30

## 2022-06-10 RX ORDER — CEPHALEXIN 500 MG/1
500 CAPSULE ORAL 4 TIMES DAILY
Qty: 28 CAPSULE | Refills: 0 | Status: SHIPPED | OUTPATIENT
Start: 2022-06-10 | End: 2022-08-12

## 2022-06-10 RX ORDER — OXYCODONE AND ACETAMINOPHEN 10; 325 MG/1; MG/1
1 TABLET ORAL
Qty: 56 TABLET | Refills: 0 | Status: CANCELLED | OUTPATIENT
Start: 2022-06-10 | End: 2022-06-24

## 2022-06-10 NOTE — PROGRESS NOTES
[address] [place] [address]  [phone]           Patient: Dontae Huerta                MRN: [id number]       SSN: [id number]  YOB: 1945        AGE: 78 y.o. SEX: male  Body mass index is 41.09 kg/m². PCP: Alexi Knutson MD  06/10/22      This office note has been dictated. REVIEW OF SYSTEMS:  Constitutional: Negative for fever, chills, weight loss and malaise/fatigue. HENT: Negative. Eyes: Negative. Respiratory: Negative. Cardiovascular: Negative. Gastrointestinal: No bowel incontinence or constipation. Genitourinary: No bladder incontinence or saddle anesthesia. Skin: Negative. Neurological: Negative. Endo/Heme/Allergies: Negative. Psychiatric/Behavioral: Negative. Musculoskeletal: As per HPI above. Past Medical History:   Diagnosis Date    Arrhythmia 2006    AFib. before diagnosis of Graves Disease    Chronic right hip pain     Hypertension     Sleep apnea     uses CPAP    Spinal stenosis     Thyroid disease 06/09/2006    Killed Thyroid with Iodine 131 had Graves Disease         Current Outpatient Medications:     oxyCODONE-acetaminophen (PERCOCET 10)  mg per tablet, Take 1 Tablet by mouth every six (6) hours as needed for Pain. take for up to 7 days. Max daily amount 8 tabs  , Disp: , Rfl:     docusate sodium (Colace) 100 mg capsule, Take 1 Capsule by mouth two (2) times a day for 90 days. , Disp: 60 Capsule, Rfl: 2    ondansetron hcl (Zofran) 4 mg tablet, Take 1 Tablet by mouth every eight (8) hours as needed for Nausea., Disp: 30 Tablet, Rfl: 1    celecoxib (CeleBREX) 200 mg capsule, Take 1 Capsule by mouth two (2) times a day for 90 days. , Disp: 60 Capsule, Rfl: 2    cefadroxil (DURICEF) 500 mg capsule, Take 1 Capsule by mouth two (2) times a day., Disp: 14 Capsule, Rfl: 0    aspirin delayed-release 81 mg tablet, Take 1 Tablet by mouth two (2) times a day., Disp: 60 Tablet, Rfl: 1    cholecalciferol (Vitamin D3) (1000 Units /25 mcg) tablet, Take 1,000 Units by mouth three (3) times daily (with meals). , Disp: , Rfl:     metoprolol succinate (TOPROL-XL) 100 mg tablet, Take 100 mg by mouth nightly., Disp: , Rfl:     levothyroxine (SYNTHROID) 150 mcg tablet, Take 150 mcg by mouth nightly. Takes  Synthroid at 2100, Disp: , Rfl:     ROSUVASTATIN CALCIUM (CRESTOR PO), Take 10 mg by mouth daily (after lunch). , Disp: , Rfl:     Allergies   Allergen Reactions    Atorvastatin Myalgia    Statins-Hmg-Coa Reductase Inhibitors Myalgia    Stings [Sting, Bee] Swelling       Social History     Socioeconomic History    Marital status:      Spouse name: Not on file    Number of children: Not on file    Years of education: Not on file    Highest education level: Not on file   Occupational History    Not on file   Tobacco Use    Smoking status: Former Smoker    Smokeless tobacco: Never Used    Tobacco comment: occas Cigar    Vaping Use    Vaping Use: Never used   Substance and Sexual Activity    Alcohol use: Yes     Comment: occas    Drug use: Never    Sexual activity: Not on file   Other Topics Concern    Not on file   Social History Narrative    Not on file     Social Determinants of Health     Financial Resource Strain:     Difficulty of Paying Living Expenses: Not on file   Food Insecurity:     Worried About [address] in the Last Year: Not on file    [address] in the Last Year: Not on file   Transportation Needs:     Lack of Transportation (Medical): Not on file    Lack of Transportation (Non-Medical):  Not on file   Physical Activity:     Days of Exercise per Week: Not on file    Minutes of Exercise per Session: Not on file   Stress:     Feeling of Stress : Not on file   Social Connections:     Frequency of Communication with Friends and Family: Not on file    Frequency of Social Gatherings with Friends and Family: Not on file  Attends Nondenominational Services: Not on file    Active Member of Clubs or Organizations: Not on file    Attends Club or Organization Meetings: Not on file    Marital Status: Not on file   Intimate Partner Violence:     Fear of Current or Ex-Partner: Not on file    Emotionally Abused: Not on file    Physically Abused: Not on file    Sexually Abused: Not on file   Housing Stability:     Unable to Pay for Housing in the Last Year: Not on file    Number of [place] in the Last Year: Not on file    Unstable Housing in the Last Year: Not on file       Past Surgical History:   Procedure Laterality Date    HX CATARACT REMOVAL Bilateral 9/2021 , 10/2021    HX COLONOSCOPY      HX HERNIA REPAIR  1990    HX HIP REPLACEMENT      HI SINUS SURGERY PROC UNLISTED  2012    HI TOTAL HIP ARTHROPLASTY Left 01/2010    by Dr. Todd Hensley             Patient seen evaluated today for his right total replacement. Is now 17 days status post surgery and doing well. He is unsure with the wound. Pain is well controlled. Is taken no pain medicine. He has been receiving home health physical therapy the complications. Patient denies recent fevers, chills, chest pain, SOB, or injuries. No recent systemic changes noted. A 12-point review of systems is performed today. Pertinent positives are noted. All other systems reviewed and otherwise are negative. Physical exam: General: Alert and oriented x3, nad.  well-developed, well nourished. normal affect, AF. NC/AT, EOMI, neck supple, trachea midline, no JVD present. Breathing is non-labored. Examination of the right hip reveals skin intact. The surgical wound is healing nicely. He does have a little bit of ecchymosis to the middle aspect of the wound as well as it appears an abrasion to the distal aspect of the wound. There is no surrounding erythema. No underlying fluctuance. There are no signs for infection or cellulitis present.   There is no pain with rotation of the hip. Leg lengths are perfect. Negative calf tenderness. Negative Homans. No signs of DVT present. Assessment: Status post right total hip replacement    Plan: At this point, the staples were removed and replaced with Steri-Strips no complications. He will be set up with outpatient physical therapy. He can discontinue his aspirin. He will continue with ice therapy. A prescription for Keflex was sent to his pharmacy prophylactically he does have an abrasion to the distal aspect of the wound. He also will have a prescription for Amoxil as he has an upcoming dental appointment. We will see him back in 2 weeks time for evaluation. He will call with any questions or concerns that should arise.           JR Rafat ALMANZA, PA-C, ATC

## 2022-06-11 VITALS
TEMPERATURE: 98.7 F | OXYGEN SATURATION: 98 % | DIASTOLIC BLOOD PRESSURE: 68 MMHG | HEART RATE: 70 BPM | RESPIRATION RATE: 18 BRPM | SYSTOLIC BLOOD PRESSURE: 118 MMHG

## 2022-06-11 PROCEDURE — 3331090002 HH PPS REVENUE DEBIT

## 2022-06-11 PROCEDURE — 3331090001 HH PPS REVENUE CREDIT

## 2022-06-11 NOTE — HOME HEALTH
Skilled reason for visit: dressing change    Caregiver involvement: wife, iadls, adls, meal prep, med management, taking to md appointments. Medications reviewed and all medications are available in the home this visit. The following education was provided regarding medications:  patient/cg  to continue to take medications as prescribed. patient aware to monitor for effectiveness and to notify staff of any adverse reactions to medications/any changes to medication regimen. .    MD notified of any discrepancies/look a-like medications/medication interactions: na  Medications are effective at this time. Home health supplies by type and quantity ordered/delivered this visit include: na    Patient education provided this visit: pt aware to keep incision clean and dry as ordered, to report any new drainage to staff. patient made aware to monitor for s/s of infection [increased swelling, increased redness around site, increased pain, foul smelling drainage, fever] aware who to report to/when.  encouraged patient to get three nutritional meals daily and to stay hydrated, drink plenty of fluids. pt instructed to follow a high protein diet for healing- to try to get 90g protein daily. Sharps education provided: karel    Patient level of understanding of education provided: WAS ABLE TO REPEAT BACK AND VOICED UNDERSTANDING OF ALL EDUCATION PROVIDED.       Skilled Care Performed this visit: dressing change, pic of incision is located in the media section    Patient response to procedure performed:   PATIENT TOLERATED WELL WITH NO C/O OF INCREASED PAIN OR DISCOMFORT      Agency Progress toward goals: remain out of the hospital    Patient's Progress towards personal goals: progressing    Home exercise program: PATIENT TO TAKE ALL MEDS AS ORDERED, DO ICS X10/HR WHILE AWAKE, DRINK PLENTY OF FLUIDS,    Continued need for the following skills: Nursing and Physical Therapy    Plan for next visit: wound care    Patient and/or caregiver notified and agrees to changes in the Plan of Care N/A      The following discharge planning was discussed with the pt/caregiver: Patient will be discharged once education has completed, patient is medically stable and pt/cg are able to independently manage dressing changes/ incisional care.

## 2022-06-14 ENCOUNTER — HOSPITAL ENCOUNTER (OUTPATIENT)
Dept: PHYSICAL THERAPY | Age: 77
Discharge: HOME | End: 2022-06-14
Attending: PHYSICIAN ASSISTANT
Payer: MEDICARE

## 2022-06-14 PROCEDURE — 97161 PT EVAL LOW COMPLEX 20 MIN: CPT

## 2022-06-14 PROCEDURE — 97535 SELF CARE MNGMENT TRAINING: CPT

## 2022-06-14 PROCEDURE — 97110 THERAPEUTIC EXERCISES: CPT

## 2022-06-14 NOTE — PROGRESS NOTES
[facility]  [address]  [facility], [address]  [phone] fax    Plan of Care/ Statement of Necessity for Physical Therapy Services    Patient name: Eran Womack Start of Care: 2022   Referral source: Mariya Spence : 1945    Medical Diagnosis: Right hip pain [M25.551]  Payor: [place] MEDICARE / Plan: [place] MEDICARE PART A & B / Product Type: Medicare /  Onset Date:2022    Treatment Diagnosis: right hip pain   Prior Hospitalization: see medical history Provider#: [id number]   Medications: Verified on Patient summary List    Comorbidities: left CALEB , LBP with injections, DDD, stenosis, thyroid problems, hearing impairment, BMI > 30    Prior Level of Function: Pt has been limited with ADL ease due to hip pain over the last two years      The Plan of Care and following information is based on the information from the initial evaluation. Assessment/ key information: Pt is a 62-year-old man presenting to the clinic s/p a right total hip arthroplasty with posterolateral approach performed on 2022. Pt received HHPT. His staples were removed 6/10/2022 and he was placed on antibiotics/anti-inflammatories due to concern of distal incisional healing. Pt's main c/o is of weakness, reduced ROM, and reliance on his SPC. Impairments found include reduced B hip strength, reduced left knee strength, impaired stability, reduced hip flexor and quad flexibility, soft tissue restrictions, and impaired ease of ambulatory ADLs. Pt will benefit from skilled PT services to address the aforementioned impairments and improve ease of ADLs.     Evaluation Complexity History MEDIUM  Complexity : 1-2 comorbidities / personal factors will impact the outcome/ POC ; Examination LOW Complexity : 1-2 Standardized tests and measures addressing body structure, function, activity limitation and / or participation in recreation  ;Presentation LOW Complexity : Stable, uncomplicated  ;Clinical Decision Making MEDIUM Complexity : FOTO score of 26-74  Overall Complexity Rating: LOW   Problem List: pain affecting function, decrease ROM, decrease strength, edema affecting function, impaired gait/ balance, decrease ADL/ functional abilitiies, decrease activity tolerance, decrease flexibility/ joint mobility and decrease transfer abilities   Treatment Plan may include any combination of the following: Therapeutic exercise, Therapeutic activities, Neuromuscular re-education, Physical agent/modality, Gait/balance training, Manual therapy, Patient education, Self Care training, Functional mobility training, Home safety training and Stair training  Patient / Family readiness to learn indicated by: asking questions, trying to perform skills and interest  Persons(s) to be included in education: patient (P)  Barriers to Learning/Limitations: None  Patient Goal (s): improved balance, strength, eliminate need for use of cane  Patient Self Reported Health Status: excellent  Rehabilitation Potential: good    Short Term Goals: To be accomplished in 2 weeks:  1. Pt will report compliance with his HEP to maximize therapeutic outcomes. 2. Pt will amb 120ft without SPC with SBA to improve independence with gait. Long Term Goals: To be accomplished in 4 weeks:  1. Pt will improve his left hip flexion and quad strength to 4/5 MMT or better to improve ease of negotiating steps. 2. Pt will improve his left hip ext/abd strength to 3+/5 MMT to improve stability with ambulation. 3. Pt will amb community distances without his cane, demonstrating improved independence and stability. 4. Pt will perform 5 STS without UE support from a standard 18 inch chair, indicating improved ease with transfers. 5.  Pt will improve his FOTO to 63, demonstrating improved functional ADL ease. Frequency / Duration: Patient to be seen 2 times per week for 4 weeks.     Patient/ Caregiver education and instruction: Diagnosis, prognosis, self care, activity modification and exercises   [x]  Plan of care has been reviewed with PTA    Certification Period: 6/14/2022 -- 7/13/2022  Juanita Mcneill, PT 6/14/2022 3:59 PM    ________________________________________________________________________    I certify that the above Therapy Services are being furnished while the patient is under my care. I agree with the treatment plan and certify that this therapy is necessary.     [de-identified] Signature:____________________  Date:____________Time: _________     Cha Porras PA-C  Please sign and return to [facility] [address] [place] [address] & [facility]  [address] [facility] [address] [place] [address], [facility] [address]  [phone] fax

## 2022-06-14 NOTE — PROGRESS NOTES
PT DAILY TREATMENT NOTE     Patient Name: Thor Rivera  Date:2022  : 1945  [x]  Patient  Verified  Payor: VA MEDICARE / Plan: VA MEDICARE PART A & B / Product Type: Medicare /    In time:3p  Out time:340pm  Total Treatment Time (min): 40  Visit #: 1 of 8    Medicare/BCBS Only   Total Timed Codes (min):  23 1:1 Treatment Time:  40       Treatment Area: Right hip pain [M25.551]    SUBJECTIVE  Pain Level (0-10 scale): 0  Any medication changes, allergies to medications, adverse drug reactions, diagnosis change, or new procedure performed?: [x] No    [] Yes (see summary sheet for update)  Subjective functional status/changes:   [] No changes reported  See eval    OBJECTIVE    17 min [x]Eval                  []Re-Eval        15 min Therapeutic Exercise:  [x] See flow sheet : HEP    Rationale: increase ROM and increase strength to improve the patients ability to manage ADLs with improved ease. 8 min Self care/home management:  []  See flow sheet : pt education regarding relevant anatomy and pathology as it pertains to self care. Advised on continuation of HHPT HEP as desired. Instructed on pillow b/w knees in sidelying   Rationale: increase strength and improve understanding  to improve the patients ability to manage self care.             With   [] TE   [] TA   [] neuro   [] other: Patient Education: [x] Review HEP    [] Progressed/Changed HEP based on:   [] positioning   [] body mechanics   [] transfers   [] heat/ice application    [] other:      Other Objective/Functional Measures: see eval     Pain Level (0-10 scale) post treatment: 2    ASSESSMENT/Changes in Function: see POC    Patient will continue to benefit from skilled PT services to modify and progress therapeutic interventions, address functional mobility deficits, address ROM deficits, address strength deficits, analyze and address soft tissue restrictions, analyze and cue movement patterns, analyze and modify body mechanics/ergonomics, assess and modify postural abnormalities, address imbalance/dizziness and instruct in home and community integration to attain remaining goals. []  See Plan of Care  [x]  See progress note/recertification  []  See Discharge Summary         Progress towards goals / Updated goals:  Short Term Goals: To be accomplished in 2 weeks:  1. Pt will report compliance with his HEP to maximize therapeutic outcomes. Eval: HEP assigned  2. Pt will amb 120ft without SPC with SBA to improve independence with gait. Eval: NT  Long Term Goals: To be accomplished in 4 weeks:  1. Pt will improve his left hip flexion and quad strength to 4/5 MMT or better to improve ease of negotiating steps. Eval: 4-/5  2. Pt will improve his left hip ext/abd strength to 3+/5 MMT to improve stability with ambulation. Eval: 3/5  3. Pt will amb community distances without his cane, demonstrating improved independence and stability. Eval: uses SPC  4. Pt will perform 5 STS without UE support from a standard 18 inch chair, indicating improved ease with transfers. Eval: B UE use  5. Pt will improve his FOTO to 63, demonstrating improved functional ADL ease.    Eval: 55    PLAN  []  Upgrade activities as tolerated     [x]  Continue plan of care  []  Update interventions per flow sheet       []  Discharge due to:_  []  Other:_      Mati Sherman, PT 6/14/2022  4:11 PM    Future Appointments   Date Time Provider Lena Corrales   6/16/2022  7:45 AM Glenys Keating, PT [facility]   6/21/2022  8:15 AM Elena Kellogg, PT [facility]   6/24/2022 10:30 AM Elex Iron, PT [facility]   6/28/2022  8:15 AM Elena Kellogg, PT [facility]   6/30/2022 10:00 AM Andrae Hillman, PTA [facility]   6/30/2022  2:20 PM Kendall Blue PA-C [facility]   7/5/2022  8:15 AM Elena Kellogg, PT [facility]   7/7/2022  8:30 AM Elex Iron, PT [facility]

## 2022-06-16 ENCOUNTER — HOSPITAL ENCOUNTER (OUTPATIENT)
Dept: PHYSICAL THERAPY | Age: 77
Discharge: HOME | End: 2022-06-16
Attending: PHYSICIAN ASSISTANT
Payer: MEDICARE

## 2022-06-16 PROCEDURE — 97110 THERAPEUTIC EXERCISES: CPT

## 2022-06-16 PROCEDURE — 97112 NEUROMUSCULAR REEDUCATION: CPT

## 2022-06-16 NOTE — PROGRESS NOTES
PT DAILY TREATMENT NOTE     Patient Name: Ludivina Garcia  Date:2022  : 1945  [x]  Patient  Verified  Payor: VA MEDICARE / Plan: VA MEDICARE PART A & B / Product Type: Medicare /    In [id number]  Out [id number]  Total Treatment Time (min): 61  Visit #: 2 of 8    Medicare/BCBS Only   Total Timed Codes (min):  49 1:1 Treatment Time:  39       Treatment Area: Right hip pain [M25.551]    SUBJECTIVE  Pain Level (0-10 scale): 0  Any medication changes, allergies to medications, adverse drug reactions, diagnosis change, or new procedure performed?: [x] No    [] Yes (see summary sheet for update)  Subjective functional status/changes:   [] No changes reported  The pt reports HEP compliance. He states he wants to learn to get on the floor for exercises.      OBJECTIVE    Modality rationale: decrease inflammation and decrease pain to improve the patients ability to perform ADL   Min Type Additional Details    [] Estim:  []Unatt       []IFC  []Premod                        []Other:  []w/ice   []w/heat  Position:  Location:    [] Estim: []Att    []TENS instruct  []NMES                    []Other:  []w/US   []w/ice   []w/heat  Position:  Location:    []  Traction: [] Cervical       []Lumbar                       [] Prone          []Supine                       []Intermittent   []Continuous Lbs:  [] before manual  [] after manual    []  Ultrasound: []Continuous   [] Pulsed                           []1MHz   []3MHz W/cm2:  Location:    []  Iontophoresis with dexamethasone         Location: [] Take home patch   [] In clinic   10 [x]  Ice     []  heat  []  Ice massage  []  Laser   []  Anodyne Position:seated  Location:right hip     []  Laser with stim  []  Other:  Position:  Location:    []  Vasopneumatic Device    []  Right     []  Left  Pre-treatment girth:  Post-treatment girth:  Measured at (location):  Pressure:       [] lo [] med [] hi   Temperature: [] lo [] med [] hi   [] Skin assessment post-treatment: []intact []redness- no adverse reaction    []redness - adverse reaction:         31 min Therapeutic Exercise:  [x] See flow sheet :   Rationale: increase ROM and increase strength to improve the patients ability to perform ADL       8 min Neuromuscular Re-education:  [x]  See flow sheet : MSR, dynamic gait   Rationale: increase strength, improve coordination, improve balance and increase proprioception  to improve the patients ability to perform ADL              With   [] TE   [] TA   [] neuro   [] other: Patient Education: [x] Review HEP    [] Progressed/Changed HEP based on:   [] positioning   [] body mechanics   [] transfers   [] heat/ice application    [] other:      Other Objective/Functional Measures: Educated pt to perform HEP on his bed for only now due to CALEB precautions. Pain Level (0-10 scale) post treatment: 0    ASSESSMENT/Changes in Function: The pt gave good effort with today's treatment. He is compliant with HEP. The pt requires CGA for gait without use of AD and was ambulating with AD prior to surgery. At this time continued use of cane is required. No pain noted post treatment. Patient will continue to benefit from skilled PT services to modify and progress therapeutic interventions, address functional mobility deficits, address ROM deficits, address strength deficits, analyze and address soft tissue restrictions, analyze and cue movement patterns and assess and modify postural abnormalities to attain remaining goals. []  See Plan of Care  []  See progress note/recertification  []  See Discharge Summary         Progress towards goals / Updated goals:  Short Term Goals: To be accomplished in 2 weeks:  1. Pt will report compliance with his HEP to maximize therapeutic outcomes. Eval: HEP assigned   Current: reports compliance on 6-16-22  2. Pt will amb 120ft without SPC with SBA to improve independence with gait.               Eval: NT   Current: requires CGA with unsteady gait 120' on 6-16-22  Long Term Goals: To be accomplished in 4 weeks:  1. Pt will improve his left hip flexion and quad strength to 4/5 MMT or better to improve ease of negotiating steps. Eval: 4-/5  2. Pt will improve his left hip ext/abd strength to 3+/5 MMT to improve stability with ambulation. Eval: 3/5  3. Pt will amb community distances without his cane, demonstrating improved independence and stability. Eval: uses SPC  4. Pt will perform 5 STS without UE support from a standard 18 inch chair, indicating improved ease with transfers. Eval: B UE use  5. Pt will improve his FOTO to 63, demonstrating improved functional ADL ease.               Eval: 55       PLAN  []  Upgrade activities as tolerated     [x]  Continue plan of care  []  Update interventions per flow sheet       []  Discharge due to:_  []  Other:_      Arron Osullivan, PT 6/16/2022  8:14 AM    Future Appointments   Date Time Provider Lena Allredi   6/21/2022  8:15 AM Vidhya Sadler, PT [facility]   6/24/2022 10:30 AM Lisseth Gutiérrez, PT [facility]   6/28/2022  8:15 AM Vidhya Sadler, PT [facility]   6/30/2022 10:00 AM Trip López, PTA [facility]   6/30/2022  2:20 PM Betsy Alexander PA-C [facility]   7/5/2022  8:15 AM Vidhya Sadler, PT [facility]   7/7/2022  8:30 AM Lisseth Gutiérrez, PT [facility]

## 2022-06-21 ENCOUNTER — HOSPITAL ENCOUNTER (OUTPATIENT)
Dept: PHYSICAL THERAPY | Age: 77
Discharge: HOME | End: 2022-06-21
Attending: PHYSICIAN ASSISTANT
Payer: MEDICARE

## 2022-06-21 PROCEDURE — 97112 NEUROMUSCULAR REEDUCATION: CPT

## 2022-06-21 PROCEDURE — 97110 THERAPEUTIC EXERCISES: CPT

## 2022-06-21 NOTE — PROGRESS NOTES
PT DAILY TREATMENT NOTE     Patient Name: Morentia Anderson  Date:2022  : 1945  [x]  Patient  Verified  Payor: Carol Julian / Plan: [place] MED[place] & B / Product Type: Medicare /    In time:8:19 AM  Out time: 9:12 AM  Total Treatment Time (min): 48  Visit #: 3 of 8    Medicare/BCBS Only   Total Timed Codes (min):  42 1:1 Treatment Time:  53       Treatment Area: Right hip pain [M25.551]    SUBJECTIVE  Pain Level (0-10 scale): 0  Any medication changes, allergies to medications, adverse drug reactions, diagnosis change, or new procedure performed?: [x] No    [] Yes (see summary sheet for update)  Subjective functional status/changes:   [] No changes reported  No changes since last session, no increase in discomfort following exercise program initiation. OBJECTIVE    Modality rationale: decrease pain to improve the patients ability to manage post exercise soreness. Min Type Additional Details   10 [x]  Ice     []  heat  []  Ice massage  []  Laser   []  Anodyne Position: seated  Location: right hip     34 min Therapeutic Exercise:  [x] See flow sheet :   Rationale: increase ROM and increase strength to improve the patients ability to complete ADLs. 8 min Neuromuscular Re-education:  [x]  See flow sheet :   Rationale: increase strength and improve coordination  to improve the patients ability to maintain stability when navigating dynamic environments. With   [] TE   [] TA   [] neuro   [] other: Patient Education: [x] Review HEP    [] Progressed/Changed HEP based on:   [] positioning   [] body mechanics   [] transfers   [] heat/ice application    [] other:      Other Objective/Functional Measures:   Skilled cues for mechanics.    Sidelying external rotation and SLR added    Pain Level (0-10 scale) post treatment: 0    ASSESSMENT/Changes in Function: Patient requiring frequent redirection to mechanics, [place] noted with functional strengthening activities and gait with and without straight cane. Patient will continue to benefit from skilled PT services to modify and progress therapeutic interventions, address functional mobility deficits, address ROM deficits, address strength deficits, analyze and address soft tissue restrictions, analyze and cue movement patterns, analyze and modify body mechanics/ergonomics and assess and modify postural abnormalities to attain remaining goals. Progress towards goals / Updated goals:  Short Term Goals: To be accomplished in 2 weeks:  1. Pt will report compliance with his HEP to maximize therapeutic outcomes.             Eval: HEP assigned              Current: reports compliance on 6-16-22  2. Pt will amb 120ft without SPC with SBA to improve independence with gait.             Eval: NT              Current: requires CGA with unsteady gait 120' on 6-16-22  Long Term Goals: To be accomplished in 4 weeks:  1. Pt will improve his left hip flexion and quad strength to 4/5 MMT or better to improve ease of negotiating steps.              Eval: 4-/5  2. Pt will improve his left hip ext/abd strength to 3+/5 MMT to improve stability with ambulation.              Eval: 3/5  3. Pt will amb community distances without his cane, demonstrating improved independence and stability.             Eval: uses SPC  4.  Pt will perform 5 STS without UE support from a standard 18 inch chair, indicating improved ease with transfers.              Eval: B UE use  5.  Pt will improve his FOTO to 63, demonstrating improved functional ADL ease.              Eval: 55    PLAN  [x]  Upgrade activities as tolerated     []  Continue plan of care  []  Update interventions per flow sheet       []  Discharge due to:_  []  Other:_      Gualberto Bunch, PT 6/21/2022  8:22 AM    Future Appointments   Date Time Provider [facility] [place]   6/24/2022 10:30 AM Waqar Ibanez, PT [facility]   6/28/2022  8:15 AM Christiano Barajas, PT [facility]   6/30/2022 10:00 AM Anju Man Rossy Marcum, PTA [facility]   6/30/2022  2:20 PM Rebeka Miller PA-C [facility]   7/5/2022  8:15 AM Larissa Giels, PT [facility]   7/7/2022  8:30 AM Venkatesh Saeed, PT [facility]

## 2022-06-24 ENCOUNTER — HOSPITAL ENCOUNTER (OUTPATIENT)
Dept: PHYSICAL THERAPY | Age: 77
Discharge: HOME | End: 2022-06-24
Attending: PHYSICIAN ASSISTANT
Payer: MEDICARE

## 2022-06-24 PROCEDURE — 97110 THERAPEUTIC EXERCISES: CPT

## 2022-06-24 PROCEDURE — 97112 NEUROMUSCULAR REEDUCATION: CPT

## 2022-06-24 NOTE — PROGRESS NOTES
PT DAILY TREATMENT NOTE     Patient Name: Mynor Flores  Date:2022  : 1945  [x]  Patient  Verified  Payor: [place] MEDICARE / Plan: [place] MEDICARE PART A & B / Product Type: Medicare /    In time:1033am  Out time:1122  Total Treatment Time (min): 52  Visit #: 4 of 8    Medicare/BCBS Only   Total Timed Codes (min):  49 1:1 Treatment Time:  38       Treatment Area: Right hip pain [M25.551]    SUBJECTIVE  Pain Level (0-10 scale): 0  Any medication changes, allergies to medications, adverse drug reactions, diagnosis change, or new procedure performed?: [x] No    [] Yes (see summary sheet for update)  Subjective functional status/changes:   [] No changes reported  No c/o. OBJECTIVE    Modality rationale: decrease inflammation and decrease pain to improve the patients ability to manage self care. Min Type Additional Details   10 [x]  Ice     []  heat  []  Ice massage  []  Laser   []  Anodyne Position: seated right hip  Location:   [] Skin assessment post-treatment:  []intact []redness- no adverse reaction    []redness - adverse reaction:     28 min Therapeutic Exercise:  [x]? See flow sheet :   Rationale: increase ROM and increase strength to improve the patients ability to complete ADLs.    10 min Neuromuscular Re-education:  [x]? See flow sheet :   Rationale: increase strength and improve coordination  to improve the patients ability to maintain stability when navigating dynamic environments. With   [] TE   [] TA   [] neuro   [] other: Patient Education: [x] Review HEP    [] Progressed/Changed HEP based on:   [] positioning   [] body mechanics   [] transfers   [] heat/ice application    [] other:      Other Objective/Functional Measures: added S/L fire hydrant with assist      Pain Level (0-10 scale) post treatment: 0    ASSESSMENT/Changes in Function: Pt quite unsteady with left sidestepping and march stepping.  Needs assist with fire hydrants in S/L. 4 inch step ups remain challenging, so leg press was withheld. Puts forth good effort with therEx. Patient will continue to benefit from skilled PT services to modify and progress therapeutic interventions, address functional mobility deficits, address ROM deficits, address strength deficits, analyze and address soft tissue restrictions, analyze and cue movement patterns, analyze and modify body mechanics/ergonomics, assess and modify postural abnormalities, address imbalance/dizziness and instruct in home and community integration to attain remaining goals. []  See Plan of Care  []  See progress note/recertification  []  See Discharge Summary         Progress towards goals / Updated goals:  Short Term Goals: To be accomplished in 2 weeks:  1. Pt will report compliance with his HEP to maximize therapeutic outcomes.             Eval: HEP assigned              EJMPBEL: reports compliance on 6-16-22  2. Pt will amb 120ft without SPC with SBA to improve independence with gait.             Eval: NT              PBXMRHS: requires CGA with unsteady gait 120' on 6-16-22  Long Term Goals: To be accomplished in 4 weeks:  1. Pt will improve his left hip flexion and quad strength to 4/5 MMT or better to improve ease of negotiating steps.              Eval: 4-/5    2. Pt will improve his left hip ext/abd strength to 3+/5 MMT to improve stability with ambulation.              Eval: 3/5  3. Pt will amb community distances without his cane, demonstrating improved independence and stability.             Eval: uses SPC   Current: uses cane, unsteady without AD (6/24/2022)  4.  Pt will perform 5 STS without UE support from a standard 18 inch chair, indicating improved ease with transfers.              Eval: B UE use  5.  Pt will improve his FOTO to 63, demonstrating improved functional ADL ease.              Eval: 55     PLAN  []  Upgrade activities as tolerated     [x]  Continue plan of care  []  Update interventions per flow sheet       [] Discharge due to:_  []  Other:_      Serg Winn, PT 6/24/2022  10:41 AM    Future Appointments   Date Time Provider [facility] [place]   6/28/2022  8:15 AM Mila Krishnan, PT [facility]   6/30/2022 10:00 AM Samantha Huynh, PTA [facility]   6/30/2022  2:20 PM Cassie Chiang, PA-C [facility]   7/5/2022  8:15 AM Mila Krishnan, PT [facility]   7/7/2022  8:30 AM Larissa Reynolds, PT [facility]

## 2022-06-28 ENCOUNTER — HOSPITAL ENCOUNTER (OUTPATIENT)
Dept: PHYSICAL THERAPY | Age: 77
Discharge: HOME | End: 2022-06-28
Attending: PHYSICIAN ASSISTANT
Payer: MEDICARE

## 2022-06-28 PROCEDURE — 97110 THERAPEUTIC EXERCISES: CPT

## 2022-06-28 PROCEDURE — 97112 NEUROMUSCULAR REEDUCATION: CPT

## 2022-06-28 NOTE — PROGRESS NOTES
PT DAILY TREATMENT NOTE     Patient Name: Lyubov Bazzi  Date:2022  : 1945  [x]  Patient  Verified  Payor: [place] MEDICARE / Plan: [place] MEDICARE PART A & B / Product Type: Medicare /    In time:8:15 AM  Out time:8:59 AM  Total Treatment Time (min): 44  Visit #: 5 of 8    Medicare/BCBS Only   Total Timed Codes (min):  34 1:1 Treatment Time:  44       Treatment Area: Right hip pain [M25.551]    SUBJECTIVE  Pain Level (0-10 scale): 0  Any medication changes, allergies to medications, adverse drug reactions, diagnosis change, or new procedure performed?: [x] No    [] Yes (see summary sheet for update)  Subjective functional status/changes:   [x] No changes reported    OBJECTIVE    Modality rationale: decrease pain to improve the patients ability to manage post exercise soreness for ADL tolerance through remainder of day. Min Type Additional Details   10 [x]  Ice     []  heat  []  Ice massage  []  Laser   []  Anodyne Position: seated  Location: right hip     24 min Therapeutic Exercise:  [x] See flow sheet :   Rationale: increase ROM and increase strength to improve the patients ability to negotiate stairs and perform functional transfers. 10 min Neuromuscular Re-education:  [x]  See flow sheet :   Rationale: increase strength, improve coordination, improve balance and increase proprioception  to improve the patients ability to navigate dynamic environments with increased stability and safety.     With   [] TE   [] TA   [] neuro   [] other: Patient Education: [x] Review HEP    [] Progressed/Changed HEP based on:   [] positioning   [] body mechanics   [] transfers   [] heat/ice application    [] other:      Other Objective/Functional Measures:  Increased repetitions and resistance  Trendelenburg present with single leg activities  Cues for mechanics to enhance glute med recruitment     Pain Level (0-10 scale) post treatment: 0    ASSESSMENT/Changes in Function: Completes 5 sit to stand from 18\" surface without UE support. Otherwise tolerating progressions well though gait abnormalities still present with and without straight cane use. Patient will continue to benefit from skilled PT services to address functional mobility deficits, address ROM deficits, address strength deficits, analyze and address soft tissue restrictions, analyze and cue movement patterns and analyze and modify body mechanics/ergonomics to attain remaining goals. Progress towards goals / Updated goals:  Short Term Goals: To be accomplished in 2 weeks:  1. Pt will report compliance with his HEP to maximize therapeutic outcomes.             Eval: HEP assigned              AYXYMID: reports compliance on 6-16-22  2. Pt will amb 120ft without SPC with SBA to improve independence with gait.             Eval: NT              IELYJUB: requires CGA with unsteady gait 120' on 6-16-22    Long Term Goals: To be accomplished in 4 weeks:  1. Pt will improve his left hip flexion and quad strength to 4/5 MMT or better to improve ease of negotiating steps.              Eval: 4-/5    2. Pt will improve his left hip ext/abd strength to 3+/5 MMT to improve stability with ambulation.              Eval: 3/5  3. Pt will amb community distances without his cane, demonstrating improved independence and stability.             Eval: uses SPC              Current: uses cane, unsteady without AD (6/24/2022)  4.  Pt will perform 5 STS without UE support from a standard 18 inch chair, indicating improved ease with transfers.              Eval: B UE use   Current: 6/28/2022 - MET - completes 5 sit to stand from 18\" surface, no UE  5.  Pt will improve his FOTO to 63, demonstrating improved functional ADL ease.              Eval: 55     PLAN  []  Upgrade activities as tolerated     []  Continue plan of care  []  Update interventions per flow sheet       []  Discharge due to:_  []  Other:_      Sudeep Mccarthy, PT 6/28/2022  7:52 AM    Future Appointments Date Time Provider [facility]   6/28/2022  8:15 AM Abraham Oppenheim, PT [facility]   6/30/2022 10:00 AM Jordan Retana, PTA [facility]   6/30/2022  2:20 PM Ebony Tyson PA-C [facility]   7/5/2022  8:15 AM Abraham Oppenheim, PT [facility]   7/7/2022  8:30 AM Brayan Mendez, PT [facility]

## 2022-06-30 ENCOUNTER — OFFICE VISIT (OUTPATIENT)
Dept: ORTHOPEDIC SURGERY | Age: 77
End: 2022-06-30
Payer: MEDICARE

## 2022-06-30 ENCOUNTER — HOSPITAL ENCOUNTER (OUTPATIENT)
Dept: PHYSICAL THERAPY | Age: 77
Discharge: HOME | End: 2022-06-30
Attending: PHYSICIAN ASSISTANT
Payer: MEDICARE

## 2022-06-30 VITALS — WEIGHT: 303 LBS | HEIGHT: 73 IN | BODY MASS INDEX: 40.16 KG/M2 | TEMPERATURE: 98.7 F

## 2022-06-30 DIAGNOSIS — Z96.641 STATUS POST RIGHT HIP REPLACEMENT: Primary | ICD-10-CM

## 2022-06-30 DIAGNOSIS — Z98.890 POST-OPERATIVE STATE: ICD-10-CM

## 2022-06-30 DIAGNOSIS — M25.551 RIGHT HIP PAIN: ICD-10-CM

## 2022-06-30 DIAGNOSIS — Z96.642 HISTORY OF LEFT HIP REPLACEMENT: ICD-10-CM

## 2022-06-30 PROCEDURE — 73502 X-RAY EXAM HIP UNI 2-3 VIEWS: CPT | Performed by: PHYSICIAN ASSISTANT

## 2022-06-30 PROCEDURE — 97110 THERAPEUTIC EXERCISES: CPT

## 2022-06-30 PROCEDURE — 97112 NEUROMUSCULAR REEDUCATION: CPT

## 2022-06-30 PROCEDURE — 99024 POSTOP FOLLOW-UP VISIT: CPT | Performed by: PHYSICIAN ASSISTANT

## 2022-06-30 NOTE — PROGRESS NOTES
PT DAILY TREATMENT NOTE     Patient Name: Madeleine Ferrari  Date:2022  : 1945  [x]  Patient  Verified  Payor: [place] MEDICARE / Plan: [place] MEDICARE PART A & B / Product Type: Medicare /    In time:10:02  Out time:11:00  Total Treatment Time (min): 62  Visit #: 6 of 8    Medicare/BCBS Only   Total Timed Codes (min):  48 1:1 Treatment Time:  48       Treatment Area: Right hip pain [M25.551]    SUBJECTIVE  Pain Level (0-10 scale): 0  Any medication changes, allergies to medications, adverse drug reactions, diagnosis change, or new procedure performed?: [x] No    [] Yes (see summary sheet for update)  Subjective functional status/changes:   [] No changes reported  Pt with no c/o pain this visit. OBJECTIVE    Modality rationale: decrease inflammation and decrease pain to improve the patients ability to perform ADL's with ease.    Min Type Additional Details    [] Estim:  []Unatt       []IFC  []Premod                        []Other:  []w/ice   []w/heat  Position:  Location:    [] Estim: []Att    []TENS instruct  []NMES                    []Other:  []w/US   []w/ice   []w/heat  Position:  Location:    []  Traction: [] Cervical       []Lumbar                       [] Prone          []Supine                       []Intermittent   []Continuous Lbs:  [] before manual  [] after manual    []  Ultrasound: []Continuous   [] Pulsed                           []1MHz   []3MHz W/cm2:  Location:    []  Iontophoresis with dexamethasone         Location: [] Take home patch   [] In clinic   10 [x]  Ice     []  heat  []  Ice massage  []  Laser   []  Anodyne Position:seated  Location: right hip    []  Laser with stim  []  Other:  Position:  Location:    []  Vasopneumatic Device    []  Right     []  Left  Pre-treatment girth:  Post-treatment girth:  Measured at (location):  Pressure:       [] lo [] med [] hi   Temperature: [] lo [] med [] hi   [x] Skin assessment post-treatment:  [x]intact []redness- no adverse reaction []redness - adverse reaction:       38 min Therapeutic Exercise:  [x] See flow sheet :   Rationale: increase ROM, increase strength and improve coordination to improve the patients ability to perform functional task with ease. 10 min Neuromuscular Re-education:  [x]  See flow sheet :   Rationale: increase strength, improve coordination, improve balance and increase proprioception  to improve the patients ability to perform ADL's with ease. With   [] TE   [] TA   [] neuro   [] other: Patient Education: [x] Review HEP    [] Progressed/Changed HEP based on:   [] positioning   [] body mechanics   [] transfers   [] heat/ice application    [] other:      Other Objective/Functional Measures:     Pain Level (0-10 scale) post treatment: 0    ASSESSMENT/Changes in Function:   Improved right hip flexor and quad strength noted with MMT this visit with pt reporting no pain with testing. Pt does require cueing for increased control and to decrease hip ER during side stepping and clamshells. No reports of increased pain with progressed reps and resistance in therex. Continued challenge with S/L FH with pt requiring AAROM at the right foot. Pt reports no pain post treatment. Patient will continue to benefit from skilled PT services to modify and progress therapeutic interventions, address functional mobility deficits, address ROM deficits, address strength deficits, analyze and address soft tissue restrictions, analyze and cue movement patterns, analyze and modify body mechanics/ergonomics and assess and modify postural abnormalities to attain remaining goals. [x]  See Plan of Care  []  See progress note/recertification  []  See Discharge Summary           Progress towards goals / Updated goals:  Short Term Goals: To be accomplished in 2 weeks:  1. Pt will report compliance with his HEP to maximize therapeutic outcomes.               Eval: HEP assigned              HIAMRQD: reports compliance on 6-16-22  2. Pt will amb 120ft without SPC with SBA to improve independence with gait.             Eval: NT              AZWUIEM: requires CGA with unsteady gait 120' on 6-16-22     Long Term Goals: To be accomplished in 4 weeks:  1. Pt will improve his left hip flexion and quad strength to 4/5 MMT or better to improve ease of negotiating steps.              Eval: 4-/5     Current: met 6/30/22 left hip flexion 4/5 , left quad MMT 4+/5  2. Pt will improve his left hip ext/abd strength to 3+/5 MMT to improve stability with ambulation.              Eval: 3/5  3. Pt will amb community distances without his cane, demonstrating improved independence and stability.             Eval: uses [facility]              WKURDTT: uses cane, unsteady without AD (6/24/2022)  4.  Pt will perform 5 STS without UE support from a standard 18 inch chair, indicating improved ease with transfers.              Eval: B UE use              Current: 6/28/2022 - MET - completes 5 sit to stand from 18\" surface, no UE  5.  Pt will improve his FOTO to 63, demonstrating improved functional ADL ease.              Eval: 55     PLAN  []  Upgrade activities as tolerated     [x]  Continue plan of care  []  Update interventions per flow sheet       []  Discharge due to:_  []  Other:_      Leighton Thompson, AREN 6/30/2022  10:19 AM    Future Appointments   Date Time Provider [facility]   6/30/2022  2:20 PM Gia Rodriguez PA-C [facility]   7/5/2022  8:15 AM [facility]   7/7/2022  8:30 AM Shikha Valencia, PT [facility]

## 2022-06-30 NOTE — PROGRESS NOTES
[address] [place] [address]  [phone]           Patient: Derek Schaefer                MRN: [id number]       SSN: [id number]  YOB: 1945        AGE: 78 y.o. SEX: male  Body mass index is 39.98 kg/m². PCP: Ashok Ken MD  06/30/22      This office note has been dictated. REVIEW OF SYSTEMS:  Constitutional: Negative for fever, chills, weight loss and malaise/fatigue. HENT: Negative. Eyes: Negative. Respiratory: Negative. Cardiovascular: Negative. Gastrointestinal: No bowel incontinence or constipation. Genitourinary: No bladder incontinence or saddle anesthesia. Skin: Negative. Neurological: Negative. Endo/Heme/Allergies: Negative. Psychiatric/Behavioral: Negative. Musculoskeletal: As per HPI above. Past Medical History:   Diagnosis Date    Arrhythmia 2006    AFib. before diagnosis of Graves Disease    Chronic right hip pain     Hypertension     Sleep apnea     uses CPAP    Spinal stenosis     Thyroid disease 06/09/2006    Killed Thyroid with Iodine 131 had Graves Disease         Current Outpatient Medications:     cephALEXin (KEFLEX) 500 mg capsule, Take 1 Capsule by mouth four (4) times daily. , Disp: 28 Capsule, Rfl: 0    amoxicillin (AMOXIL) 500 mg capsule, 4 tabs by mouth 1 hour prior to appointment, Disp: 12 Capsule, Rfl: 2    amoxicillin (AMOXIL) 500 mg capsule, Take 500 mg by mouth daily as needed for PRN Reason (Other) (dental appts). 4 capsules by mouth 1 hr before appt., Disp: , Rfl:     cephALEXin (KEFLEX) 500 mg capsule, Take 500 mg by mouth four (4) times daily. by mouth, Disp: , Rfl:     acetaminophen (TYLENOL) 500 mg tablet, Take 1,000 mg by mouth every six (6) hours as needed for Pain., Disp: , Rfl:     oxyCODONE-acetaminophen (PERCOCET 10)  mg per tablet, Take 1 Tablet by mouth every six (6) hours as needed for Pain. take for up to 7 days.  Max daily amount 8 tabs  , Disp: , Rfl:     docusate sodium (Colace) 100 mg capsule, Take 1 Capsule by mouth two (2) times a day for 90 days. , Disp: 60 Capsule, Rfl: 2    ondansetron hcl (Zofran) 4 mg tablet, Take 1 Tablet by mouth every eight (8) hours as needed for Nausea., Disp: 30 Tablet, Rfl: 1    celecoxib (CeleBREX) 200 mg capsule, Take 1 Capsule by mouth two (2) times a day for 90 days. , Disp: 60 Capsule, Rfl: 2    cefadroxil (DURICEF) 500 mg capsule, Take 1 Capsule by mouth two (2) times a day., Disp: 14 Capsule, Rfl: 0    aspirin delayed-release 81 mg tablet, Take 1 Tablet by mouth two (2) times a day., Disp: 60 Tablet, Rfl: 1    cholecalciferol (Vitamin D3) (1000 Units /25 mcg) tablet, Take 1,000 Units by mouth three (3) times daily (with meals). , Disp: , Rfl:     metoprolol succinate (TOPROL-XL) 100 mg tablet, Take 100 mg by mouth nightly., Disp: , Rfl:     levothyroxine (SYNTHROID) 150 mcg tablet, Take 150 mcg by mouth nightly. Takes  Synthroid at 2100, Disp: , Rfl:     ROSUVASTATIN CALCIUM (CRESTOR PO), Take 10 mg by mouth daily (after lunch). , Disp: , Rfl:     Allergies   Allergen Reactions    Atorvastatin Myalgia    Statins-Hmg-Coa Reductase Inhibitors Myalgia    Stings [Sting, Bee] Swelling       Social History     Socioeconomic History    Marital status:      Spouse name: Not on file    Number of children: Not on file    Years of education: Not on file    Highest education level: Not on file   Occupational History    Not on file   Tobacco Use    Smoking status: Former Smoker    Smokeless tobacco: Never Used    Tobacco comment: occas Cigar    Vaping Use    Vaping Use: Never used   Substance and Sexual Activity    Alcohol use: Yes     Comment: occas    Drug use: Never    Sexual activity: Not on file   Other Topics Concern    Not on file   Social History Narrative    Not on file     Social Determinants of Health     Financial Resource Strain:     Difficulty of Paying Living Expenses: Not on file   Food Insecurity:     Worried About [employer] [employer] in the Last Year: Not on file    Haseeb of Food in the Last Year: Not on file   Transportation Needs:     Lack of Transportation (Medical): Not on file    Lack of Transportation (Non-Medical): Not on file   Physical Activity:     Days of Exercise per Week: Not on file    Minutes of Exercise per Session: Not on file   Stress:     Feeling of Stress : Not on file   Social Connections:     Frequency of Communication with Friends and Family: Not on file    Frequency of Social Gatherings with Friends and Family: Not on file    Attends Spiritism Services: Not on file    Active Member of Clubs or Organizations: Not on file    Attends Club or Organization Meetings: Not on file    Marital Status: Not on file   Intimate Partner Violence:     Fear of Current or Ex-Partner: Not on file    Emotionally Abused: Not on file    Physically Abused: Not on file    Sexually Abused: Not on file   Housing Stability:     Unable to Pay for Housing in the Last Year: Not on file    Number of [place] in the Last Year: Not on file    Unstable Housing in the Last Year: Not on file       Past Surgical History:   Procedure Laterality Date    HX CATARACT REMOVAL Bilateral 9/2021 , 10/2021    HX COLONOSCOPY      HX HERNIA REPAIR  1990    HX HIP REPLACEMENT      MD SINUS SURGERY PROC UNLISTED  2012    MD TOTAL HIP ARTHROPLASTY Left 01/2010    by Dr. Artur Cole         Patient seen evaluated today for his right total hip replacement. He is now approximate 5-week status post surgery doing quite well. Is very pleased with results of the hip replacement. He does continue with physical therapy the complications. He had no troubles the wound. Pain is well controlled. Denies any injuries or falls. No start up pain. Patient denies recent fevers, chills, chest pain, SOB, or injuries. No recent systemic changes noted.   A 12-point review of systems is performed today. Pertinent positives are noted. All other systems reviewed and otherwise are negative. Physical exam: General: Alert and oriented x3, nad.  well-developed, well nourished. normal affect, AF. NC/AT, EOMI, neck supple, trachea midline, no JVD present. Breathing is non-labored. Examination of the right hip reveals skin intact. The surgical wound is healed nicely. There is no erythema or ecchymosis noted. There are no signs for infection or cellulitis present. He has no pain with rotation of the hip. Leg lengths are perfect. Abduction strength is 4+ on the right and 5 out of 5 on the left. Radiographs obtained in office today 6/30/2022 at the [facility] location include AP pelvis, AP and crosstable lateral of the right hip shows a total hip components to be well fixed without evidence for loosening or fracture noted. Assessment: Status post right total hip replacement    Plan: At this point, the patient will continue with physical therapy. He will continue with a home exercise program.  He denies need for analgesics. We will see him back in the office in 4 weeks time for evaluation and strength check. He is reminded to keep in his precautions.               JR Rafat ALMANZA, PA-C, ATC

## 2022-07-05 ENCOUNTER — HOSPITAL ENCOUNTER (OUTPATIENT)
Dept: PHYSICAL THERAPY | Age: 77
Discharge: HOME | End: 2022-07-05
Attending: PHYSICIAN ASSISTANT
Payer: MEDICARE

## 2022-07-05 PROCEDURE — 97112 NEUROMUSCULAR REEDUCATION: CPT

## 2022-07-05 PROCEDURE — 97110 THERAPEUTIC EXERCISES: CPT

## 2022-07-05 NOTE — PROGRESS NOTES
PT DAILY TREATMENT NOTE     Patient Name: Adriana Meyer  Date:2022  : 1945  [x]  Patient  Verified  Payor: [place] MEDICARE / Plan: VA MEDICARE PART A & B / Product Type: Medicare /    In time:8:11 AM  Out time: 9:03 AM  Total Treatment Time (min): 52  Visit #: 7 of 8    Medicare/BCBS Only   Total Timed Codes (min):  42 1:1 Treatment Time:  49       Treatment Area: Right hip pain [M25.551]    SUBJECTIVE  Pain Level (0-10 scale): 0  Any medication changes, allergies to medications, adverse drug reactions, diagnosis change, or new procedure performed?: [x] No    [] Yes (see summary sheet for update)  Subjective functional status/changes:   [] No changes reported  Has been attempting to walk without cane at home but still feels unsteady    OBJECTIVE    Modality rationale: decrease pain to improve the patients ability to manage post exercise soreness for ADL tolerance through remainder of day. Min Type Additional Details   10 [x]  Ice     []  heat  []  Ice massage  []  Laser   []  Anodyne Position: seated  Location: right hip     32 min Therapeutic Exercise:  [x] See flow sheet :   Rationale: increase ROM and increase strength to improve the patients ability to perform MRADLs. 10 min Neuromuscular Re-education:  [x]  See flow sheet :   Rationale: increase strength, improve coordination and increase proprioception  to improve the patients ability to navigate dynamic surfaces with stability.             With   [] TE   [] TA   [] neuro   [] other: Patient Education: [x] Review HEP    [] Progressed/Changed HEP based on:   [] positioning   [] body mechanics   [] transfers   [] heat/ice application    [] other:      Other Objective/Functional Measures:   Ambulated 120' without SPC with SPV; compensated [place] observed     Pain Level (0-10 scale) post treatment: 0    ASSESSMENT/Changes in Function: Patient demonstrating improvement in glute medius strength with isolation exercises however continues to show weakness with functional movement patterns. Continue to progress functional strengthening for greater safety and independence in ambulation. Patient will continue to benefit from skilled PT services to modify and progress therapeutic interventions, address functional mobility deficits, address ROM deficits, address strength deficits, analyze and address soft tissue restrictions and analyze and cue movement patterns to attain remaining goals. Progress towards goals / Updated goals:  Short Term Goals: To be accomplished in 2 weeks:  1. Pt will report compliance with his HEP to maximize therapeutic outcomes.             Eval: HEP assigned              FOQLSPH: reports compliance on 6-16-22  2. Pt will amb 120ft without SPC with SBA to improve independence with gait.             Eval: NT              FFPGWHQ: 7/5/2022 - MET     Long Term Goals: To be accomplished in 4 weeks:  1. Pt will improve his left hip flexion and quad strength to 4/5 MMT or better to improve ease of negotiating steps.              Eval: 4-/5                Current: met 6/30/22 left hip flexion 4/5 , left quad MMT 4+/5  2. Pt will improve his left hip ext/abd strength to 3+/5 MMT to improve stability with ambulation.              Eval: 3/5  3. Pt will amb community distances without his cane, demonstrating improved independence and stability.             Eval: uses [facility]              FRHVVVX: uses cane, unsteady without AD (6/24/2022)  4.  Pt will perform 5 STS without UE support from a standard 18 inch chair, indicating improved ease with transfers.              Eval: B UE use              Current: 6/28/2022 - MET - completes 5 sit to stand from 18\" surface, no UE  5.  Pt will improve his FOTO to 63, demonstrating improved functional ADL ease.              Eval: 55        PLAN  []  Upgrade activities as tolerated     []  Continue plan of care  []  Update interventions per flow sheet       []  Discharge due to:_  [] Other:_      Devante Nath, PT 7/5/2022  8:10 AM    Future Appointments   Date Time Provider Lena Savanna   7/5/2022  8:15 AM [facility]   7/7/2022  8:30 AM Kj Aden, PT [facility]   8/12/2022  8:50 AM Jigna Short PA-C [facility]

## 2022-07-07 ENCOUNTER — HOSPITAL ENCOUNTER (OUTPATIENT)
Dept: PHYSICAL THERAPY | Age: 77
Discharge: HOME | End: 2022-07-07
Attending: PHYSICIAN ASSISTANT
Payer: MEDICARE

## 2022-07-07 PROCEDURE — 97110 THERAPEUTIC EXERCISES: CPT

## 2022-07-07 PROCEDURE — 97112 NEUROMUSCULAR REEDUCATION: CPT

## 2022-07-07 NOTE — PROGRESS NOTES
PT DAILY TREATMENT NOTE     Patient Name: Melani Boykin  Date:2022  : 1945  [x]  Patient  Verified  Payor: VA MEDICARE / Plan: VA MEDICARE PART A & B / Product Type: Medicare /    In time:823am  Out time:919am  Total Treatment Time (min): 64  Visit #: 8 of 8    Medicare/BCBS Only   Total Timed Codes (min):  56 1:1 Treatment Time: 40         Treatment Area: Right hip pain [M25.551]    SUBJECTIVE  Pain Level (0-10 scale): 0, sore  Any medication changes, allergies to medications, adverse drug reactions, diagnosis change, or new procedure performed?: [x] No    [] Yes (see summary sheet for update)  Subjective functional status/changes:   [] No changes reported  Reports right hip flexor/adductor tightness. OBJECTIVE    Modality rationale: decrease inflammation and decrease pain to improve the patients ability to manage self care. Min Type Additional Details   10 [x]  Ice     []  heat  []  Ice massage  []  Laser   []  Anodyne Position:seated  Location:right hip   [] Skin assessment post-treatment:  []intact []redness- no adverse reaction    []redness - adverse reaction:     30 min Therapeutic Exercise:  [x] See flow sheet :   Rationale: increase ROM and increase strength to improve the patients ability to manage ADLs with increased independence and reduced discomfort. 10 min Neuromuscular Re-education:  [x]  See flow sheet :   Rationale: increase strength, improve coordination, improve balance and increase proprioception  to improve the patients ability to ambulate with improved stability and reduced fall risk.             With   [] TE   [] TA   [] neuro   [] other: Patient Education: [x] Review HEP    [] Progressed/Changed HEP based on:   [] positioning   [] body mechanics   [] transfers   [] heat/ice application    [] other:      Other Objective/Functional Measures: see re-cert     Pain Level (0-10 scale) post treatment: 0    ASSESSMENT/Changes in Function: see re-cert    Patient will continue to benefit from skilled PT services to modify and progress therapeutic interventions, address functional mobility deficits, address ROM deficits, address strength deficits, analyze and address soft tissue restrictions, analyze and cue movement patterns, analyze and modify body mechanics/ergonomics, assess and modify postural abnormalities, address imbalance/dizziness and instruct in home and community integration to attain remaining goals. []  See Plan of Care  []  See progress note/recertification  []  See Discharge Summary         Progress towards goals / Updated goals:  Short Term Goals: To be accomplished in 2 weeks:  1. Pt will report compliance with his HEP to maximize therapeutic outcomes.             Eval: HEP assigned              PEHLFXT: reports compliance on 6-16-22  2. Pt will amb 120ft without SPC with SBA to improve independence with gait.             Eval: NT              TORYKME: 7/5/2022 - MET     Long Term Goals: To be accomplished in 4 weeks:  1. Pt will improve his left hip flexion and quad strength to 4/5 MMT or better to improve ease of negotiating steps.              Eval: 4-/5                Current: met 6/30/22 left hip flexion 4/5 , left quad MMT 4+/5  2. Pt will improve his left hip ext/abd strength to 3+/5 MMT to improve stability with ambulation.               Eval: 3/5    Current: remains, 3/5 (7/7/2022)   3. Pt will amb community distances without his cane, demonstrating improved independence and stability.             Eval: uses [facility]              KYNFNMP: uses cane, unsteady without AD (6/24/2022)  4.  Pt will perform 5 STS without UE support from a standard 18 inch chair, indicating improved ease with transfers.              Eval: B UE use              Current: 6/28/2022 - MET - completes 5 sit to stand from 18\" surface, no UE  5.  Pt will improve his FOTO to 63, demonstrating improved functional ADL ease.              Eval: 55     Current: 58 (7/7/2022)    PLAN  [] Upgrade activities as tolerated     [x]  Continue plan of care  []  Update interventions per flow sheet       []  Discharge due to:_  []  Other:_      Elizabeth Marlow, PT 7/7/2022  8:23 AM    Future Appointments   Date Time Provider Lena Corrales   7/7/2022  8:30 AM [facility]   8/12/2022  8:50 AM Claudetta Kindred, PA-C [facility]

## 2022-07-07 NOTE — PROGRESS NOTES
[facility]  [address]  [place], [address]  [phone] fax    Continued Plan of Care/ Re-certification for Physical Therapy Services    Patient name: Haily Cantu Start of Care: 2022   Referral source: Jaspreet Lamberto : 1945   Medical/Treatment Diagnosis: Right hip pain [M25.551]  Payor: VA MEDICARE / Plan: VA MEDICARE PART A & B / Product Type: Medicare /  Onset Date:2022     Prior Hospitalization: see medical history Provider#: [id number]   Medications: Verified on Patient Summary List    Comorbidities: left CALEB , LBP with injections, DDD, stenosis, thyroid problems, hearing impairment, BMI > 30    Prior Level of Function: Pt has been limited with ADL ease due to hip pain over the last two years  Visits from Start of Care: 8    Missed Visits: 0    The Plan of Care and following information is based on the patient's current status:  Short Term Goals: To be accomplished in 2 weeks:  1. Pt will report compliance with his HEP to maximize therapeutic outcomes.             Eval: HEP assigned              TZCVSMP: met, reports compliance  2. Pt will amb 120ft without SPC with SBA to improve independence with gait.             Eval: NT              MAQMTRE: MET     Long Term Goals: To be accomplished in 4 weeks:  1. Pt will improve his left hip flexion and quad strength to 4/5 MMT or better to improve ease of negotiating steps.              Eval: 4-/5                Current: met, left hip flexion 4/5 , left quad MMT 4+/5  2. Pt will improve his left hip ext/abd strength to 3+/5 MMT to improve stability with ambulation.               Eval: 3/5               Current: remains, 3/5    3. Pt will amb community distances without his cane, demonstrating improved independence and stability.             Eval: uses [facility]              VXSXOMJ: uses cane, unsteady without AD  4.  Pt will perform 5 STS without UE support from a standard 18 inch chair, indicating improved ease with transfers.              Eval: B UE use              Current: MET - completes 5 sit to stand from 18\" surface, no UE  5.  Pt will improve his FOTO to 63, demonstrating improved functional ADL ease.              Eval: 55                Current: progressing, 58    Key functional changes: FOTO 58      Problems/ barriers to goal attainment: post-operative healing     Problem List: pain affecting function, decrease ROM, decrease strength, impaired gait/ balance, decrease ADL/ functional abilitiies, decrease activity tolerance, decrease flexibility/ joint mobility and decrease transfer abilities    Treatment Plan: Therapeutic exercise, Therapeutic activities, Neuromuscular re-education, Physical agent/modality, Gait/balance training, Manual therapy, Patient education, Self Care training, Functional mobility training, Home safety training and Stair training     Patient Goal (s) has been updated and includes: walk without a cane     Goals for this certification period to be accomplished in 4 weeks:  1. Pt will improve his left hip ext/abd strength to 3+/5 MMT to improve stability with ambulation. Re-cert: remains, 3/5    2. Pt will amb community distances without his cane, demonstrating improved independence and stability.             Re-cert: uses cane, unsteady without AD  3.  Pt will improve his FOTO to 63, demonstrating improved functional ADL ease.                Re-cert: progressing, 58    Frequency / Duration: Patient to be seen 2 times per week for 4 weeks:    Assessment / Recommendations:Pt is making good progress with strengthening of his quads and hip flexors, but maintains gluteal weakness, although he is able to accept greater challenge with hip abd/ER exercises. Without his SPC, he maintains a left hip drop and reduced right LE stance time.  He is now able to complete a STS from a standard 18 inches chair without UE assist, although he does c/o hip flexor and adductor soreness. This improves somewhat with a modifed Mike stretch. Recommend continued PT to strengthen the right hip and progress pt towards increased independence with ambulation. Certification Period: 7/11/2022 -- 8/9/2022    Dianelys Rosas, PT 7/7/2022 9:10 AM    ________________________________________________________________________  I certify that the above Therapy Services are being furnished while the patient is under my care. I agree with the treatment plan and certify that this therapy is necessary. [] I have read the above and request that my patient continue as recommended.   [] I have read the above report and request that my patient continue therapy with the following changes/special instructions: _____________________________________________  [] I have read the above report and request that my patient be discharged from therapy    Physician's Signature:____________Date:_________TIME:________     Saba Rob PA-C  ** Signature, Date and Time must be completed for valid certification **    Please sign and return to [facility] [address] [place] [address] & [facility]  [address]  [place], [address]  [phone] fax

## 2022-07-11 ENCOUNTER — HOSPITAL ENCOUNTER (OUTPATIENT)
Dept: PHYSICAL THERAPY | Age: 77
Discharge: HOME | End: 2022-07-11
Attending: PHYSICIAN ASSISTANT
Payer: MEDICARE

## 2022-07-11 PROCEDURE — 97110 THERAPEUTIC EXERCISES: CPT

## 2022-07-11 PROCEDURE — 97112 NEUROMUSCULAR REEDUCATION: CPT

## 2022-07-11 NOTE — PROGRESS NOTES
PT DAILY TREATMENT NOTE     Patient Name: Vivi Alvarado  Date:2022  : 1945  [x]  Patient  Verified  Payor: Catie Parks / Plan: [place] MED[place] & B / Product Type: Medicare /    In time:915am  Out time:1010am  Total Treatment Time (min): 54  Visit #: 1 of 8    Medicare/BCBS Only   Total Timed Codes (min):  55 1:1 Treatment Time:  40       Treatment Area: Right hip pain [M25.551]    SUBJECTIVE  Pain Level (0-10 scale): 0  Any medication changes, allergies to medications, adverse drug reactions, diagnosis change, or new procedure performed?: [x] No    [] Yes (see summary sheet for update)  Subjective functional status/changes:   [] No changes reported  Reports he \"pulled\" his left anterior/lateral thigh walking yesterday. OBJECTIVE    Modality rationale: decrease inflammation and decrease pain to improve the patients ability to manage self care   Min Type Additional Details   10 [x]  Ice     []  heat  []  Ice massage  []  Laser   []  Anodyne Position:left s/l with pillow b/w knees  Location: right hip   [] Skin assessment post-treatment:  []intact []redness- no adverse reaction    []redness - adverse reaction:     30 min Therapeutic Exercise:  [x]? See flow sheet :   Rationale: increase ROM and increase strength to improve the patients ability to manage ADLs with increased independence and reduced discomfort. 10 min Neuromuscular Re-education:  [x]? See flow sheet :   Rationale: increase strength, improve coordination, improve balance and increase proprioception  to improve the patients ability to ambulate with improved stability and reduced fall risk.            With   [] TE   [] TA   [] neuro   [] other: Patient Education: [x] Review HEP    [] Progressed/Changed HEP based on:   [] positioning   [] body mechanics   [] transfers   [] heat/ice application    [] other:      Other Objective/Functional Measures: exercises per flowsheet     Pain Level (0-10 scale) post treatment: 0    ASSESSMENT/Changes in Function: Added hip adductor stretch to assist with groin pain. Exercises not progressed due to increased muscular pain today. Advise continued stretching and strengthening as jesus. Patient will continue to benefit from skilled PT services to modify and progress therapeutic interventions, address functional mobility deficits, address ROM deficits, address strength deficits, analyze and address soft tissue restrictions, analyze and cue movement patterns, analyze and modify body mechanics/ergonomics, assess and modify postural abnormalities, address imbalance/dizziness and instruct in home and community integration to attain remaining goals. []  See Plan of Care  []  See progress note/recertification  []  See Discharge Summary         Progress towards goals / Updated goals:  1. Pt will improve his left hip ext/abd strength to 3+/5 MMT to improve stability with ambulation.               Re-cert: remains, 3/5     Current: remains, 3/5 (7/11/2022)  2. Pt will amb community distances without his cane, demonstrating improved independence and stability.               Re-cert: uses cane, unsteady without AD  3.  Pt will improve his FOTO to 61, demonstrating improved functional ADL ease.                Re-cert: progressing, 58    PLAN  []  Upgrade activities as tolerated     [x]  Continue plan of care  []  Update interventions per flow sheet       []  Discharge due to:_  []  Other:_      Shandra Martinez, PT 7/11/2022  9:32 AM    Future Appointments   Date Time Provider Lena Corrales   7/13/2022  9:45 [facility]   7/19/2022 11:15 AM Catherine Mcclelland [facility]   7/21/2022  7:00 AM [facility]ivar, [facility]   7/26/2022  9:00 AM [facility]d [facility]ivar, [facility]   7/28/2022  9:15 AM Bautista Mullen, PTA [facility]   8/10/2022  9:45 AM Bautistaivanna Mullen, PTA [facility]   8/12/2022  8:50 AM [facility] Lilly [facility]-C [facility]

## 2022-07-13 ENCOUNTER — HOSPITAL ENCOUNTER (OUTPATIENT)
Dept: PHYSICAL THERAPY | Age: 77
Discharge: HOME | End: 2022-07-13
Attending: PHYSICIAN ASSISTANT
Payer: MEDICARE

## 2022-07-13 PROCEDURE — 97116 GAIT TRAINING THERAPY: CPT

## 2022-07-13 PROCEDURE — 97112 NEUROMUSCULAR REEDUCATION: CPT

## 2022-07-13 PROCEDURE — 97110 THERAPEUTIC EXERCISES: CPT

## 2022-07-19 ENCOUNTER — HOSPITAL ENCOUNTER (OUTPATIENT)
Dept: PHYSICAL THERAPY | Age: 77
Discharge: HOME | End: 2022-07-19
Attending: PHYSICIAN ASSISTANT
Payer: MEDICARE

## 2022-07-19 PROCEDURE — 97110 THERAPEUTIC EXERCISES: CPT

## 2022-07-19 PROCEDURE — 97112 NEUROMUSCULAR REEDUCATION: CPT

## 2022-07-19 PROCEDURE — 97116 GAIT TRAINING THERAPY: CPT

## 2022-07-19 NOTE — PROGRESS NOTES
PT DAILY TREATMENT NOTE     Patient Name: Vivi Alvarado  Date:2022  : 1945  [x]  Patient  Verified  Payor: [place] MEDICARE / Plan: [place] MEDICARE PART A & B / Product Type: Medicare /    In time: 3922  Out time: 4018  Total Treatment Time (min): 51  Visit #: 3 of 8    Medicare/BCBS Only   Total Timed Codes (min):  41 1:1 Treatment Time:  41       Treatment Area: Right hip pain [M25.551]    SUBJECTIVE  Pain Level (0-10 scale): 0  Any medication changes, allergies to medications, adverse drug reactions, diagnosis change, or new procedure performed?: [x] No    [] Yes (see summary sheet for update)  Subjective functional status/changes:   [] No changes reported  Denies pain.     OBJECTIVE    Modality rationale: decrease inflammation and decrease pain to improve the patients ability to perform functional tasks with greater ease    Min Type Additional Details    [] Estim:  []Unatt       []IFC  []Premod                        []Other:  []w/ice   []w/heat  Position:  Location:    [] Estim: []Att    []TENS instruct  []NMES                    []Other:  []w/US   []w/ice   []w/heat  Position:  Location:    []  Traction: [] Cervical       []Lumbar                       [] Prone          []Supine                       []Intermittent   []Continuous Lbs:  [] before manual  [] after manual    []  Ultrasound: []Continuous   [] Pulsed                           []1MHz   []3MHz W/cm2:  Location:    []  Iontophoresis with dexamethasone         Location: [] Take home patch   [] In clinic   10 [x]  Ice     []  heat  []  Ice massage  []  Laser   []  Anodyne Position: left sidelying   Location: right hip     []  Laser with stim  []  Other:  Position:  Location:    []  Vasopneumatic Device    []  Right     []  Left  Pre-treatment girth:  Post-treatment girth:  Measured at (location):  Pressure:       [] lo [] med [] hi   Temperature: [] lo [] med [] hi   [] Skin assessment post-treatment:  []intact []redness- no adverse reaction    []redness - adverse reaction:     18 min Therapeutic Exercise:  [x] See flow sheet :   Rationale: increase ROM, increase strength and improve coordination to improve the patients ability to perform ADLs and self care tasks with greater ease      15 min Neuromuscular Re-education:  [x]  See flow sheet :heel press, SLS with cone tapping, MSR on airex, glute stabilization at barrel   Rationale: increase strength, improve coordination, improve balance and increase proprioception  to improve the patients ability to perform functional tasks with greater stabilization and control      8 min Gait Training: Forward walking without SPC x 45 feet with emphasis on proper gait mechanics, lateral stepping x 30' each without SPC, slow march walking x 45' without SPC    Rationale: to improve balance, stabilization, and control with ambulation and decrease fall risk        With   [] TE   [] TA   [] neuro   [] other: Patient Education: [x] Review HEP    [] Progressed/Changed HEP based on:   [] positioning   [] body mechanics   [] transfers   [] heat/ice application    [] other:      Other Objective/Functional Measures: 3# with step ups, added single leg cone taps and heel press for greater glute strength/stabilization in single leg stance to improve gait mechanics       Pain Level (0-10 scale) post treatment: 0    ASSESSMENT/Changes in Function: Performed dynamic gait activities without SPC today though continues to have antalgic properties, improving. Emphasized single leg activities today to improve gait mechanics. Frequent cueing to slow down with single leg activities and focus on neutral pelvis rather than strengthening of the working leg. Provided patient with magenta theraband to continue progressing strength activities at home.        Patient will continue to benefit from skilled PT services to modify and progress therapeutic interventions, address functional mobility deficits, address ROM deficits, address strength deficits, analyze and address soft tissue restrictions, analyze and cue movement patterns, analyze and modify body mechanics/ergonomics, assess and modify postural abnormalities, address imbalance/dizziness and instruct in home and community integration to attain remaining goals. []  See Plan of Care  []  See progress note/recertification  []  See Discharge Summary         Progress towards goals / Updated goals:  1. Pt will improve his left hip ext/abd strength to 3+/5 MMT to improve stability with ambulation.               Re-cert: remains, 2/6                Current: remains, 3/5 (7/11/2022)  2. Pt will amb community distances without his cane, demonstrating improved independence and stability.               Re-cert: uses cane, unsteady without AD   Current: \"I waddle when I don't have my cane\", initiated more single leg stabilization activities today 7/19/2022  3.  Pt will improve his FOTO to 63, demonstrating improved functional ADL ease.                Re-cert: progressing, 58    PLAN  [x]  Upgrade activities as tolerated     []  Continue plan of care  []  Update interventions per flow sheet       []  Discharge due to:_  []  Other:_      Deangelo Adams,, PT, DPT 7/19/2022  11:20 AM    Future Appointments   Date Time Provider Lena Corrales   7/21/2022  7:00 AM Evelyn Castaneda PT [facility]   7/26/2022  9:00 AM Evelyn Castaneda PT [facility]   7/28/2022  9:15 AM Lisbeth Worrell PTA [facility]   8/10/2022  9:45 AM Lisbeth Worrell PTA [facility]   8/12/2022  8:50 AM Mariama Baires PA-C [facility]

## 2022-07-21 ENCOUNTER — HOSPITAL ENCOUNTER (OUTPATIENT)
Dept: PHYSICAL THERAPY | Age: 77
Discharge: HOME | End: 2022-07-21
Attending: PHYSICIAN ASSISTANT
Payer: MEDICARE

## 2022-07-21 PROCEDURE — 97112 NEUROMUSCULAR REEDUCATION: CPT

## 2022-07-21 PROCEDURE — 97116 GAIT TRAINING THERAPY: CPT

## 2022-07-21 PROCEDURE — 97110 THERAPEUTIC EXERCISES: CPT

## 2022-07-21 NOTE — PROGRESS NOTES
PT DAILY TREATMENT NOTE     Patient Name: Donald Nelson  Date:2022  : 1945  [x]  Patient  Verified  Payor: Chace Racer / Plan: [place] MEDICARE PART A & B / Product Type: Medicare /    In time:7AM  Out time:755am  Total Treatment Time (min): 54  Visit #: 4 of 8    Medicare/BCBS Only   Total Timed Codes (min):  55 1:1 Treatment Time:  45       Treatment Area: Right hip pain [M25.551]    SUBJECTIVE  Pain Level (0-10 scale): 0  Any medication changes, allergies to medications, adverse drug reactions, diagnosis change, or new procedure performed?: [x] No    [] Yes (see summary sheet for update)  Subjective functional status/changes:   [] No changes reported  Reports he stubbed his toe last night but otherwise no hip pain. OBJECTIVE    Modality rationale: decrease edema, decrease inflammation, and decrease pain to improve the patients ability to manage self care. Min Type Additional Details   10 [x]  Ice     []  heat  []  Ice massage  []  Laser   []  Anodyne Position:left s/l with pillow bw knees  Location: right hip   [] Skin assessment post-treatment:  []intact []redness- no adverse reaction    []redness - adverse reaction:     25 min Therapeutic Exercise:  [x] See flow sheet :   Rationale: increase ROM, increase strength and improve coordination to improve the patients ability to perform ADLs and self care tasks with greater ease     12 min Neuromuscular Re-education:  [x]  See flow sheet :heel press, MSR on airex, glute stabilization at barrel   Rationale: increase strength, improve coordination, improve balance and increase proprioception  to improve the patients ability to perform functional tasks with greater stabilization and control. 8 min Gait Training:   Forward walking without SPC x 60 feet with emphasis on proper gait mechanics, lateral stepping x 45' each without SPC, slow march walking x 45' without SPC    Rationale: to improve balance, stabilization, and control with ambulation and decrease fall risk           With   [] TE   [] TA   [] neuro   [] other: Patient Education: [x] Review HEP    [] Progressed/Changed HEP based on:   [] positioning   [] body mechanics   [] transfers   [] heat/ice application    [] other:      Other Objective/Functional Measures: exercises progressed per flowsheet     Pain Level (0-10 scale) post treatment: 0    ASSESSMENT/Changes in Function: Pt performs exercises with some improved pelvic control today. He takes shorter steps with walking, but displays reduced hip drop as compared to PN. Requires cueing prn for exercise form. Patient will continue to benefit from skilled PT services to modify and progress therapeutic interventions, address functional mobility deficits, address ROM deficits, address strength deficits, analyze and address soft tissue restrictions, analyze and cue movement patterns, analyze and modify body mechanics/ergonomics, assess and modify postural abnormalities, address imbalance/dizziness, and instruct in home and community integration to attain remaining goals. []  See Plan of Care  []  See progress note/recertification  []  See Discharge Summary         Progress towards goals / Updated goals:  1. Pt will improve his left hip ext/abd strength to 3+/5 MMT to improve stability with ambulation. Re-cert: remains, 3/5                Current: remains, 3/5 (7/11/2022)  2. Pt will amb community distances without his cane, demonstrating improved independence and stability. Re-cert: uses cane, unsteady without AD              Current: \"I waddle when I don't have my cane\", initiated more single leg stabilization activities today 7/19/2022  3. Pt will improve his FOTO to 63, demonstrating improved functional ADL ease.                Re-cert: progressing, 62    PLAN  []  Upgrade activities as tolerated     [x]  Continue plan of care  []  Update interventions per flow sheet       []  Discharge due to:_  [] Other:_      Krys Rojo, PT 7/21/2022  7:10 AM    Future Appointments   Date Time Provider Lena Corrales   7/26/2022  9:00 AM Ned Weaver, PT [facility]   7/28/2022  9:15 [facility]   8/10/2022  9:45 AM Ricky Cantrell PTA [facility]   8/12/2022  8:50 AM Mallory Quesada PA-C [facility]

## 2022-07-26 ENCOUNTER — HOSPITAL ENCOUNTER (OUTPATIENT)
Dept: PHYSICAL THERAPY | Age: 77
Discharge: HOME | End: 2022-07-26
Attending: PHYSICIAN ASSISTANT
Payer: MEDICARE

## 2022-07-26 PROCEDURE — 97112 NEUROMUSCULAR REEDUCATION: CPT

## 2022-07-26 PROCEDURE — 97110 THERAPEUTIC EXERCISES: CPT

## 2022-07-26 PROCEDURE — 97116 GAIT TRAINING THERAPY: CPT

## 2022-07-26 NOTE — PROGRESS NOTES
PT DAILY TREATMENT NOTE     Patient Name: Vivi Alvarado  Date:2022  : 1945  [x]  Patient  Verified  Payor: Catie Parks / Plan: [place] MEDICARE PART A & B / Product Type: Medicare /    In time:9am  Out time:955am  Total Treatment Time (min): 55  Visit #: 5 of 8    Medicare/BCBS Only   Total Timed Codes (min):  55 1:1 Treatment Time:  45       Treatment Area: Right hip pain [M25.551]    SUBJECTIVE  Pain Level (0-10 scale): 0  Any medication changes, allergies to medications, adverse drug reactions, diagnosis change, or new procedure performed?: [x] No    [] Yes (see summary sheet for update)  Subjective functional status/changes:   [x] No changes reported    OBJECTIVE    Modality rationale: decrease inflammation and decrease pain to improve the patients ability to manage self care. Min Type Additional Details   10 [x]  Ice     []  heat  []  Ice massage  []  Laser   []  Anodyne Position:left S/l with pillow bw knees  Location: right hip and low back   [] Skin assessment post-treatment:  []intact []redness- no adverse reaction    []redness - adverse reaction:     29 min Therapeutic Exercise:  [x] See flow sheet :   Rationale: increase ROM, increase strength and improve coordination to improve the patients ability to perform ADLs and self care tasks with greater ease     8 min Neuromuscular Re-education:  [x]  See flow sheet :MSR on airex, glute stabilization at barrel   Rationale: increase strength, improve coordination, improve balance and increase proprioception  to improve the patients ability to perform functional tasks with greater stabilization and control. 8 min Gait Training: Forward walking without SPC 45ft x 2 with emphasis on proper gait mechanics, lateral stepping x 45' each without SPC, slow march walking x 45' without SPC    Rationale: to improve balance, stabilization, and control with ambulation and decrease fall risk.            With   [] TE   [] TA   [] neuro   [] other: Patient Education: [x] Review HEP    [] Progressed/Changed HEP based on:   [] positioning   [] body mechanics   [] transfers   [] heat/ice application    [] other:      Other Objective/Functional Measures: see flowsheet     Pain Level (0-10 scale) post treatment: 0    ASSESSMENT/Changes in Function: Pt requires active assistance from therapist in order to lift his right hip against gravity into hip abd. He will benefit from continued challenge for     Patient will continue to benefit from skilled PT services to modify and progress therapeutic interventions, address functional mobility deficits, address ROM deficits, address strength deficits, analyze and address soft tissue restrictions, analyze and cue movement patterns, analyze and modify body mechanics/ergonomics, assess and modify postural abnormalities, address imbalance/dizziness, and instruct in home and community integration to attain remaining goals. []  See Plan of Care  []  See progress note/recertification  []  See Discharge Summary         Progress towards goals / Updated goals:  1. Pt will improve his left hip ext/abd strength to 3+/5 MMT to improve stability with ambulation. Re-cert: remains, 3/5      Current: remains, 3/5 (7/26/2022)  2. Pt will amb community distances without his cane, demonstrating improved independence and stability. Re-cert: uses cane, unsteady without AD              Current: \"I waddle when I don't have my cane\", initiated more single leg stabilization activities today 7/19/2022  3. Pt will improve his FOTO to 63, demonstrating improved functional ADL ease.                Re-cert: progressing, 62    PLAN  []  Upgrade activities as tolerated     [x]  Continue plan of care  []  Update interventions per flow sheet       []  Discharge due to:_  []  Other:_      Vangie Siegel, PT 7/26/2022  9:08 AM    Future Appointments   Date Time Provider Lena Corrales   7/28/2022  9:15 AM Lidya Mercado C, PTA [facility]   8/10/2022  9:45 AM Loreta Murphy, PTA [facility]   8/12/2022  8:50 AM Marcell Link PA-C [facility]

## 2022-07-28 ENCOUNTER — HOSPITAL ENCOUNTER (OUTPATIENT)
Dept: PHYSICAL THERAPY | Age: 77
Discharge: HOME | End: 2022-07-28
Attending: PHYSICIAN ASSISTANT
Payer: MEDICARE

## 2022-07-28 PROCEDURE — 97116 GAIT TRAINING THERAPY: CPT

## 2022-07-28 PROCEDURE — 97112 NEUROMUSCULAR REEDUCATION: CPT

## 2022-07-28 PROCEDURE — 97110 THERAPEUTIC EXERCISES: CPT

## 2022-07-28 NOTE — PROGRESS NOTES
PT DAILY TREATMENT NOTE     Patient Name: Fouzia Golden  Date:2022  : 1945  [x]  Patient  Verified  Payor: [place] MEDICARE / Plan: [place] MEDICARE PART A & B / Product Type: Medicare /    In time:9:15  Out time:10:14  Total Treatment Time (min): 61  Visit #: 6 of 8    Medicare/BCBS Only   Total Timed Codes (min):  49 1:1 Treatment Time:  49       Treatment Area: Right hip pain [M25.551]    SUBJECTIVE  Pain Level (0-10 scale): 0  Any medication changes, allergies to medications, adverse drug reactions, diagnosis change, or new procedure performed?: [x] No    [] Yes (see summary sheet for update)  Subjective functional status/changes:   [] No changes reported  Pt reports no changes since last visit. OBJECTIVE    Modality rationale: decrease inflammation and decrease pain to improve the patients ability to perform ADL's with ease.    Min Type Additional Details    [] Estim:  []Unatt       []IFC  []Premod                        []Other:  []w/ice   []w/heat  Position:  Location:    [] Estim: []Att    []TENS instruct  []NMES                    []Other:  []w/US   []w/ice   []w/heat  Position:  Location:    []  Traction: [] Cervical       []Lumbar                       [] Prone          []Supine                       []Intermittent   []Continuous Lbs:  [] before manual  [] after manual    []  Ultrasound: []Continuous   [] Pulsed                           []1MHz   []3MHz W/cm2:  Location:    []  Iontophoresis with dexamethasone         Location: [] Take home patch   [] In clinic   10 [x]  Ice     []  heat  []  Ice massage  []  Laser   []  Anodyne Position:left S/L w/pillow between knees  Location: right hip, low back    []  Laser with stim  []  Other:  Position:  Location:    []  Vasopneumatic Device    []  Right     []  Left  Pre-treatment girth:  Post-treatment girth:  Measured at (location):  Pressure:       [] lo [] med [] hi   Temperature: [] lo [] med [] hi   [] Skin assessment post-treatment: []intact []redness- no adverse reaction    []redness - adverse reaction:         29 min Therapeutic Exercise:  [x] See flow sheet :   Rationale: increase ROM and increase strength to improve the patients ability to perform functional task with ease. 12 min Neuromuscular Re-education:  [x]  See flow sheet :   Rationale: increase strength, improve coordination, improve balance, and increase proprioception  to improve the patients ability to perform ADL's with ease. 8 min Gait Training: Forward walking without SPC 60ft x 2 with emphasis on proper gait mechanics, lateral stepping x 45' each without SPC, slow march walking x 45' without SPC    Rationale: With   [] TE   [] TA   [] neuro   [] other: Patient Education: [x] Review HEP    [] Progressed/Changed HEP based on:   [] positioning   [] body mechanics   [] transfers   [] heat/ice application    [] other:      Other Objective/Functional Measures:      Pain Level (0-10 scale) post treatment: 0    ASSESSMENT/Changes in Function:   Continued challenge with S/L hip ABD on the right with pt noting pain in the right hip adductor and needing continued assist from therapist.     Patient will continue to benefit from skilled PT services to modify and progress therapeutic interventions, address functional mobility deficits, address ROM deficits, address strength deficits, analyze and address soft tissue restrictions, analyze and cue movement patterns, analyze and modify body mechanics/ergonomics, and assess and modify postural abnormalities to attain remaining goals. [x]  See Plan of Care  []  See progress note/recertification  []  See Discharge Summary         Progress towards goals / Updated goals:  1. Pt will improve his left hip ext/abd strength to 3+/5 MMT to improve stability with ambulation. Re-cert: remains, 3/5                 Current: remains, 3/5 (7/26/2022)  2.  Pt will amb community distances without his cane, demonstrating improved independence and stability. Re-cert: uses cane, unsteady without AD              Current: \"I waddle when I don't have my cane\", initiated more single leg stabilization activities today 7/19/2022  3. Pt will improve his FOTO to 63, demonstrating improved functional ADL ease.                Re-cert: progressing, 58   Current: Met 7/28/22 FOTO score 65       PLAN  []  Upgrade activities as tolerated     [x]  Continue plan of care  []  Update interventions per flow sheet       []  Discharge due to:_  []  Other:_      Preston Hoskins, AREN 7/28/2022  9:24 AM    Future Appointments   Date Time Provider Lena Corrales   8/10/2022  9:45 AM Marilyn Correia, PTA [facility]   8/12/2022  8:50 AM Do Hayes PA-C [facility]   8/16/2022 10:00 AM Anthony Corcoran, PT [facility]   8/18/2022  8:30 AM Marilyn Beenick, PTA [facility]   8/23/2022  9:00 AM Fara Nieto, PT [facility]   8/25/2022  8:30 AM Marilyn Beenick, PTA [facility]   8/30/2022  8:30 AM Jen Tong, PTA [facility]   9/1/2022  8:30 AM Marilyn Beenick, PTA [facility]

## 2022-08-10 ENCOUNTER — HOSPITAL ENCOUNTER (OUTPATIENT)
Dept: PHYSICAL THERAPY | Age: 77
Discharge: HOME | End: 2022-08-10
Attending: PHYSICIAN ASSISTANT
Payer: MEDICARE

## 2022-08-10 PROCEDURE — 97110 THERAPEUTIC EXERCISES: CPT

## 2022-08-10 PROCEDURE — 97116 GAIT TRAINING THERAPY: CPT

## 2022-08-10 PROCEDURE — 97112 NEUROMUSCULAR REEDUCATION: CPT

## 2022-08-10 NOTE — PROGRESS NOTES
PT DAILY TREATMENT NOTE     Patient Name: Marc Daniel  Date:8/10/2022  : 1945  [x]  Patient  Verified  Payor: [place] MEDICARE / Plan: [place] MEDICARE PART A & B / Product Type: Medicare /    In time:9:45  Out time:10:45  Total Treatment Time (min): 60  Visit #: 7 of 8    Medicare/BCBS Only   Total Timed Codes (min):  50 1:1 Treatment Time:  45       Treatment Area: Right hip pain [M25.551]    SUBJECTIVE  Pain Level (0-10 scale): 0  Any medication changes, allergies to medications, adverse drug reactions, diagnosis change, or new procedure performed?: [x] No    [] Yes (see summary sheet for update)  Subjective functional status/changes:   [] No changes reported  Pt reports his right hip is feels great he just needs more strengthening for the muscles. OBJECTIVE    Modality rationale: decrease inflammation and decrease pain to improve the patients ability to perform ADL's with ease.    Min Type Additional Details    [] Estim:  []Unatt       []IFC  []Premod                        []Other:  []w/ice   []w/heat  Position:  Location:    [] Estim: []Att    []TENS instruct  []NMES                    []Other:  []w/US   []w/ice   []w/heat  Position:  Location:    []  Traction: [] Cervical       []Lumbar                       [] Prone          []Supine                       []Intermittent   []Continuous Lbs:  [] before manual  [] after manual    []  Ultrasound: []Continuous   [] Pulsed                           []1MHz   []3MHz W/cm2:  Location:    []  Iontophoresis with dexamethasone         Location: [] Take home patch   [] In clinic   10 [x]  Ice     []  heat  []  Ice massage  []  Laser   []  Anodyne Position:left S/L  Location: right hip    []  Laser with stim  []  Other:  Position:  Location:    []  Vasopneumatic Device    []  Right     []  Left  Pre-treatment girth:  Post-treatment girth:  Measured at (location):  Pressure:       [] lo [] med [] hi   Temperature: [] lo [] med [] hi   [] Skin assessment post-treatment:  []intact []redness- no adverse reaction    []redness - adverse reaction:         27 min Therapeutic Exercise:  [x] See flow sheet :   Rationale: increase ROM, increase strength, and improve coordination to improve the patients ability to perform functional task with ease. 15 min Neuromuscular Re-education:  [x]  See flow sheet :   Rationale: increase strength, improve coordination, improve balance, and increase proprioception  to improve the patients ability to perform ADL's with ease. 8 min Gait Training: Forward walking without SPC 60ft x 2 with emphasis on proper gait mechanics, lateral stepping x 45' each without SPC, slow march walking x 45' without SPC    Rationale: With   [] TE   [] TA   [] neuro   [] other: Patient Education: [x] Review HEP    [] Progressed/Changed HEP based on:   [] positioning   [] body mechanics   [] transfers   [] heat/ice application    [] other:      Other Objective/Functional Measures:   SL abdirahman step over- 10x   SL march with 1 UE in //: bars     Pain Level (0-10 scale) post treatment: 0    ASSESSMENT/Changes in Function:   The pt has had slower progress with strengthening to the right glute med since last progress note with pt continuing to be challenged with right glute activation with SL activities. Pt continues to utilize a [facility] with ambulation due to his remaining weakness with minor trendelenburg noted on the left with gait. The pt has noticed a decrease in right groin pain since [facility] but notes a minor increase in right quad discomfort with increased fatigue. The pt has displayed improved hip flexion and quad strength since I.E and is progressing with with static and dynamic balance activities. The pt is to benefit from continued treatment to improve right hip and glute strength and stability to aid with ease of ambulation ADL's.      Patient will continue to benefit from skilled PT services to modify and progress therapeutic interventions, address functional mobility deficits, address ROM deficits, address strength deficits, analyze and address soft tissue restrictions, analyze and cue movement patterns, analyze and modify body mechanics/ergonomics, and assess and modify postural abnormalities to attain remaining goals. [x]  See Plan of Care  []  See progress note/recertification  []  See Discharge Summary         Progress towards goals / Updated goals:  1. Pt will improve his left hip ext/abd strength to 3+/5 MMT to improve stability with ambulation. Re-cert: remains, 3/5                 Current: remains, 3/5 (7/26/2022)  2. Pt will amb community distances without his cane, demonstrating improved independence and stability. Re-cert: uses cane, unsteady without AD              Current: \"I waddle when I don't have my cane\", initiated more single leg stabilization activities today 7/19/2022  3. Pt will improve his FOTO to 63, demonstrating improved functional ADL ease.                Re-cert: progressing, 58              Current: Met 7/28/22 FOTO score 65    PLAN  []  Upgrade activities as tolerated     [x]  Continue plan of care  []  Update interventions per flow sheet       []  Discharge due to:_  []  Other:_      Cecily Sparks, PTA 8/10/2022  9:57 AM    Future Appointments   Date Time Provider Lena Corrales   8/12/2022  8:50 AM Claudetta Kindred, PA-C [facility]   8/16/2022 10:00 AM Raissa Gallardo, PT [facility]   8/18/2022  8:30 AM Elmyra Kid, PTA [facility]   8/23/2022  9:00 AM Natasha Franklin, PT [facility]   8/25/2022  8:30 AM Elmyra Kid, PTA [facility]   8/30/2022  8:30 AM Obdulio Mccord, PTA [facility]   9/1/2022  8:30 AM Elmyra Kid, PTA [facility]

## 2022-08-11 NOTE — PROGRESS NOTES
[facility]  [address] [facility] [address]  [place], [address]  [phone] fax    Continued Plan of Care/ Re-certification for Physical Therapy Services    Patient name: Estela Pulido Start of Care: 2022   Referral source: Dante Bosworth : 1945   Medical/Treatment Diagnosis: Right hip pain [M25.551]  Payor: VA MEDICARE / Plan: VA MEDICARE PART A & B / Product Type: Medicare /  Onset Date:2022     Prior Hospitalization: see medical history Provider#: [id number]   Medications: Verified on Patient Summary List    Comorbidities: left CALEB , LBP with injections, DDD, stenosis, thyroid problems, hearing impairment, BMI > 30    Prior Level of Function: Pt has been limited with ADL ease due to hip pain over the last two years    Visits from Start of Care: 15    Missed Visits: 0    Progress towards goals / Updated goals:  1. Pt will improve his left hip ext/abd strength to 3+/5 MMT to improve stability with ambulation. Re-cert: remains, 3/                 Current: remains, 3/5   2. Pt will amb community distances without his cane, demonstrating improved independence and stability. Re-cert: uses cane, unsteady without AD              Current: \"I waddle when I don't have my cane\", initiated more single leg stabilization activities today   3. Pt will improve his FOTO to 63, demonstrating improved functional ADL ease.                Re-cert: progressing, 58              Current: Met  FOTO score 65       Key functional changes:       Problems/ barriers to goal attainment: weakness     Problem List: pain affecting function, decrease ROM, decrease strength, edema affecting function, impaired gait/ balance, decrease ADL/ functional abilitiies, decrease activity tolerance, decrease flexibility/ joint mobility, and decrease transfer abilities    Treatment Plan: Therapeutic exercise, Therapeutic activities, Neuromuscular re-education, Physical agent/modality, Gait/balance training, Patient education, Self Care training, Functional mobility training, Home safety training, and Stair training     Patient Goal (s) has been updated and includes:      Goals for this certification period to be accomplished in 4 weeks:  1. Pt will improve his left hip ext/abd strength to 3+/5 MMT to improve stability with ambulation. re-cert: remains, 3/5   2. Pt will amb community distances without his cane, demonstrating improved independence and stability. re-cert: \"I waddle when I don't have my cane\", initiated more single leg stabilization activities today     Frequency / Duration: Patient to be seen 2 times per week for 4 weeks:    Assessment / Recommendations: The pt has had slower progress with strengthening to the right glute med since last progress note with pt continuing to be challenged with right glute activation with SL activities. Pt continues to utilize a [facility] with ambulation due to his remaining weakness with minor trendelenburg noted on the left with gait. The pt has noticed a decrease in right groin pain since [facility] but notes a minor increase in right quad discomfort with increased fatigue. The pt has displayed improved hip flexion and quad strength since I.E and is progressing with with static and dynamic balance activities. The pt is to benefit from continued treatment to improve right hip and glute strength and stability to aid with ease of ambulation ADL's. Certification Period: 8/15/22-8/13/22    Nga Urban, PTA 8/11/2022 7:12 AM  Delbert Barron PT, DPT  8/11/2022  8:35 AM    ________________________________________________________________________  I certify that the above Therapy Services are being furnished while the patient is under my care. I agree with the treatment plan and certify that this therapy is necessary.     [] I have read the above and request that my patient continue as recommended.   [] I have read the above report and request that my patient continue therapy with the following changes/special instructions: _____________________________________________  [] I have read the above report and request that my patient be discharged from therapy    Physician's Signature:____________Date:_________TIME:________     Dominique Guadalupe PA-C  ** Signature, Date and Time must be completed for valid certification **    Please sign and return to [facility] [address] [place] [address] & [address]  [address]  [place], [facility] [address] [facility] [address]  [phone] fax

## 2022-08-12 ENCOUNTER — OFFICE VISIT (OUTPATIENT)
Dept: ORTHOPEDIC SURGERY | Age: 77
End: 2022-08-12
Payer: MEDICARE

## 2022-08-12 VITALS — TEMPERATURE: 97.8 F | WEIGHT: 303 LBS | HEIGHT: 73 IN | BODY MASS INDEX: 40.16 KG/M2

## 2022-08-12 DIAGNOSIS — Z96.641 STATUS POST RIGHT HIP REPLACEMENT: Primary | ICD-10-CM

## 2022-08-12 PROCEDURE — 99024 POSTOP FOLLOW-UP VISIT: CPT | Performed by: PHYSICIAN ASSISTANT

## 2022-08-12 NOTE — PROGRESS NOTES
[address] [place] [address]  [phone]           Patient: Fouzia Golden                MRN: [id number]       SSN: [id number]  YOB: 1945        AGE: 78 y.o. SEX: male  Body mass index is 39.98 kg/m². PCP: Lissette Whitney MD  08/12/22      This office note has been dictated. REVIEW OF SYSTEMS:  Constitutional: Negative for fever, chills, weight loss and malaise/fatigue. HENT: Negative. Eyes: Negative. Respiratory: Negative. Cardiovascular: Negative. Gastrointestinal: No bowel incontinence or constipation. Genitourinary: No bladder incontinence or saddle anesthesia. Skin: Negative. Neurological: Negative. Endo/Heme/Allergies: Negative. Psychiatric/Behavioral: Negative. Musculoskeletal: As per HPI above. Past Medical History:   Diagnosis Date    Arrhythmia 2006    AFib. before diagnosis of Graves Disease    Chronic right hip pain     Hypertension     Sleep apnea     uses CPAP    Spinal stenosis     Thyroid disease 06/09/2006    Killed Thyroid with Iodine 131 had Graves Disease         Current Outpatient Medications:     cephALEXin (KEFLEX) 500 mg capsule, Take 1 Capsule by mouth four (4) times daily. , Disp: 28 Capsule, Rfl: 0    oxyCODONE-acetaminophen (PERCOCET 10)  mg per tablet, Take 1 Tablet by mouth every six (6) hours as needed for Pain. take for up to 7 days. Max daily amount 8 tabs  , Disp: , Rfl:     celecoxib (CeleBREX) 200 mg capsule, Take 1 Capsule by mouth two (2) times a day for 90 days. , Disp: 60 Capsule, Rfl: 2    cholecalciferol (Vitamin D3) (1000 Units /25 mcg) tablet, Take 1,000 Units by mouth three (3) times daily (with meals). , Disp: , Rfl:     metoprolol succinate (TOPROL-XL) 100 mg tablet, Take 100 mg by mouth nightly., Disp: , Rfl:     levothyroxine (SYNTHROID) 150 mcg tablet, Take 150 mcg by mouth nightly.  Takes  Synthroid at 2100, Disp: , Rfl: ROSUVASTATIN CALCIUM (CRESTOR PO), Take 10 mg by mouth daily (after lunch). , Disp: , Rfl:     Allergies   Allergen Reactions    Atorvastatin Myalgia    Statins-Hmg-Coa Reductase Inhibitors Myalgia    Stings [Sting, Bee] Swelling       Social History     Socioeconomic History    Marital status:      Spouse name: Not on file    Number of children: Not on file    Years of education: Not on file    Highest education level: Not on file   Occupational History    Not on file   Tobacco Use    Smoking status: Former    Smokeless tobacco: Never    Tobacco comments:     occas Cigar    Vaping Use    Vaping Use: Never used   Substance and Sexual Activity    Alcohol use: Yes     Comment: occas    Drug use: Never    Sexual activity: Not on file   Other Topics Concern    Not on file   Social History Narrative    Not on file     Social Determinants of Health     Financial Resource Strain: Not on file   Food Insecurity: Not on file   Transportation Needs: Not on file   Physical Activity: Not on file   Stress: Not on file   Social Connections: Not on file   Intimate Partner Violence: Not on file   Housing Stability: Not on file       Past Surgical History:   Procedure Laterality Date    HX CATARACT REMOVAL Bilateral 9/2021 , 10/2021    HX COLONOSCOPY      [place]    HX HIP REPLACEMENT      IL SINUS SURGERY PROC UNLISTED  2012    IL TOTAL HIP ARTHROPLASTY Left 01/2010    by Dr. Jimmie Bowman           Patient seen evaluated today for his right total hip replacement. He is now approaching 3 months status post surgery and is doing quite well. He is very pleased with the results of the hip replacement. He does continue with his home exercise program and abduction exercises. He has had no troubles the wound. He denies any injuries or falls to report. Patient denies recent fevers, chills, chest pain, SOB, or injuries. No recent systemic changes noted. A 12-point review of systems is performed today.   Pertinent positives are noted. All other systems reviewed and otherwise are negative. Physical exam: General: Alert and oriented x3, nad.  well-developed, well nourished. normal affect, AF. NC/AT, EOMI, neck supple, trachea midline, no JVD present. Breathing is non-labored. Examination of the right hip reveals skin intact. The surgical wound is healed nicely. There is no erythema or ecchymosis noted. There are no signs for infection or signs present. He has no pain with rotation of the hip. Leg lengths are perfect. Abduction strength is 5 - on the right and 5 out of 5 on the left. Assessment: Status post right total hip replacement    Plan: At this point, the patient was instructed on a home exercise program and doing his abduction exercises about 3 times a week. He will continue activities as tolerated. He is reminded of his precautions. We will see him back in the office in 3 months time for evaluation.           JR Rafat ALMANZA, PAMIA, ATC

## 2022-08-16 ENCOUNTER — HOSPITAL ENCOUNTER (OUTPATIENT)
Dept: PHYSICAL THERAPY | Age: 77
Discharge: HOME | End: 2022-08-16
Attending: PHYSICIAN ASSISTANT
Payer: MEDICARE

## 2022-08-16 PROCEDURE — 97110 THERAPEUTIC EXERCISES: CPT

## 2022-08-16 PROCEDURE — 97116 GAIT TRAINING THERAPY: CPT

## 2022-08-16 PROCEDURE — 97112 NEUROMUSCULAR REEDUCATION: CPT

## 2022-08-16 NOTE — PROGRESS NOTES
PT DAILY TREATMENT NOTE     Patient Name: Adriana Meyer  Date:2022  : 1945  [x]  Patient  Verified  Payor: VA MEDICARE / Plan: VA MEDICARE PART A & B / Product Type: Medicare /    In time:1000  Out time:1053  Total Treatment Time (min): 3  Visit #: 1 of 8    Medicare/BCBS Only   Total Timed Codes (min):  43 1:1 Treatment Time:  43       Treatment Area: Right hip pain [M25.551]    SUBJECTIVE  Pain Level (0-10 scale): 0  Any medication changes, allergies to medications, adverse drug reactions, diagnosis change, or new procedure performed?: [x] No    [] Yes (see summary sheet for update)  Subjective functional status/changes:   [] No changes reported  The pt reports no significant changes.      OBJECTIVE    Modality rationale: decrease inflammation and decrease pain to improve the patients ability to perform ADL   Min Type Additional Details    [] Estim:  []Unatt       []IFC  []Premod                        []Other:  []w/ice   []w/heat  Position:  Location:    [] Estim: []Att    []TENS instruct  []NMES                    []Other:  []w/US   []w/ice   []w/heat  Position:  Location:    []  Traction: [] Cervical       []Lumbar                       [] Prone          []Supine                       []Intermittent   []Continuous Lbs:  [] before manual  [] after manual    []  Ultrasound: []Continuous   [] Pulsed                           []1MHz   []3MHz W/cm2:  Location:    []  Iontophoresis with dexamethasone         Location: [] Take home patch   [] In clinic   10 [x]  Ice     []  heat  []  Ice massage  []  Laser   []  Anodyne Position:left sidelying  Location: right hip    []  Laser with stim  []  Other:  Position:  Location:    []  Vasopneumatic Device    []  Right     []  Left  Pre-treatment girth:  Post-treatment girth:  Measured at (location):  Pressure:       [] lo [] med [] hi   Temperature: [] lo [] med [] hi   [] Skin assessment post-treatment:  []intact []redness- no adverse reaction []redness - adverse   23 min Therapeutic Exercise:  [x] See flow sheet :   Rationale: increase ROM and increase strength to improve the patients ability to perform ADL       12 min Neuromuscular Re-education:  [x]  See flow sheet :   Rationale: increase strength, improve balance, and increase proprioception  to improve the patients ability to perform ADL    8 min Gait Training:  _60__ feet on level surfaces with _SBA__ level of assist. Dynamic gait x 60' for marching, sidesteps B. Rationale: With   [] TE   [] TA   [] neuro   [] other: Patient Education: [x] Review HEP    [] Progressed/Changed HEP based on:   [] positioning   [] body mechanics   [] transfers   [] heat/ice application    [] other:      Other Objective/Functional Measures: no changes to therex     Pain Level (0-10 scale) post treatment: 0    ASSESSMENT/Changes in Function: Th pt demonstrates + Trendelenburg with gait and requires cane currently. Good effort noted with treatment but pt tends to fatigue. Glute med weakness remains. Patient will continue to benefit from skilled PT services to modify and progress therapeutic interventions, address functional mobility deficits, address ROM deficits, address strength deficits, and analyze and cue movement patterns to attain remaining goals. []  See Plan of Care  []  See progress note/recertification  []  See Discharge Summary         Progress towards goals / Updated goals:  Goals for this certification period to be accomplished in 4 weeks:  1. Pt will improve his left hip ext/abd strength to 3+/5 MMT to improve stability with ambulation. re-cert: remains, 3/5  2. Pt will amb community distances without his cane, demonstrating improved independence and stability.                          re-cert: \"I waddle when I don't have my cane\", initiated more single leg stabilization activities today    Current: requires cane 8-16-22  PLAN  []  Upgrade activities as tolerated     [x]  Continue plan of care  []  Update interventions per flow sheet       []  Discharge due to:_  []  Other:_      Bertrand Tejeda, PT 8/16/2022  10:05 AM    Future Appointments   Date Time Provider Lena Savanna   8/18/2022  8:30 AM Roz Linen, PTA [facility]   8/23/2022  9:00 AM Lalitha Wood, PT [facility]   8/25/2022  8:30 AM Roz Linen, PTA [facility]   8/30/2022  8:30 AM Amanda Palencia, PTA [facility]   9/1/2022  8:30 AM Roz Linen, PTA [facility]   11/18/2022  9:30 AM Saba Rob PA-C [facility]

## 2022-08-18 ENCOUNTER — HOSPITAL ENCOUNTER (OUTPATIENT)
Dept: PHYSICAL THERAPY | Age: 77
Discharge: HOME | End: 2022-08-18
Attending: PHYSICIAN ASSISTANT
Payer: MEDICARE

## 2022-08-18 PROCEDURE — 97112 NEUROMUSCULAR REEDUCATION: CPT

## 2022-08-18 PROCEDURE — 97110 THERAPEUTIC EXERCISES: CPT

## 2022-08-18 PROCEDURE — 97140 MANUAL THERAPY 1/> REGIONS: CPT

## 2022-08-18 NOTE — PROGRESS NOTES
PT DAILY TREATMENT NOTE     Patient Name: Maximo Cardozo  Date:2022  : 1945  [x]  Patient  Verified  Payor: [place] MEDICARE / Plan: [place] MEDICARE PART A & B / Product Type: Medicare /    In time:8:30  Out time:9:30  Total Treatment Time (min): 60  Visit #: 2 of 8    Medicare/BCBS Only   Total Timed Codes (min):  50 1:1 Treatment Time:  42       Treatment Area: Right hip pain [M25.551]    SUBJECTIVE  Pain Level (0-10 scale): 0  Any medication changes, allergies to medications, adverse drug reactions, diagnosis change, or new procedure performed?: [x] No    [] Yes (see summary sheet for update)  Subjective functional status/changes:   [] No changes reported  Pt reports he has been using his cane a little less at home to continue to wean his self away from an AD. OBJECTIVE    Modality rationale: decrease inflammation and decrease pain to improve the patients ability to perform ADL's with ease.    Min Type Additional Details    [] Estim:  []Unatt       []IFC  []Premod                        []Other:  []w/ice   []w/heat  Position:  Location:    [] Estim: []Att    []TENS instruct  []NMES                    []Other:  []w/US   []w/ice   []w/heat  Position:  Location:    []  Traction: [] Cervical       []Lumbar                       [] Prone          []Supine                       []Intermittent   []Continuous Lbs:  [] before manual  [] after manual    []  Ultrasound: []Continuous   [] Pulsed                           []1MHz   []3MHz W/cm2:  Location:    []  Iontophoresis with dexamethasone         Location: [] Take home patch   [] In clinic   10 [x]  Ice     []  heat  []  Ice massage  []  Laser   []  Anodyne Position: left S/L   Location: right hip    []  Laser with stim  []  Other:  Position:  Location:    []  Vasopneumatic Device    []  Right     []  Left  Pre-treatment girth:  Post-treatment girth:  Measured at (location):  Pressure:       [] lo [] med [] hi   Temperature: [] lo [] med [] hi   [x] Skin assessment post-treatment:  [x]intact []redness- no adverse reaction    []redness - adverse reaction:         30 min Therapeutic Exercise:  [x] See flow sheet :   Rationale: increase ROM and increase strength to improve the patients ability to perform functional task with ease. 12 min Neuromuscular Re-education:  [x]  See flow sheet :   Rationale: increase strength, improve coordination, improve balance, and increase proprioception  to improve the patients ability to perform ADL's with ease. 8 min Gait Training: _60__ feet on level surfaces with _SBA__ level of assist. Dynamic gait x 60' for marching, sidesteps B with org band and forward ambulation   Rationale: With   [] TE   [] TA   [] neuro   [] other: Patient Education: [x] Review HEP    [] Progressed/Changed HEP based on:   [] positioning   [] body mechanics   [] transfers   [] heat/ice application    [] other:      Other Objective/Functional Measures:      Pain Level (0-10 scale) post treatment: 0    ASSESSMENT/Changes in Function:   Pt ambulated through half of his therex appointment with no SPC use to continue to work on weaning away from an AD. Org band added to side stepping to continue to strengthen the B hips and improve ambulation. SLS initiated in the //: to continue to work on SL stability. No pain reported post treatment. Patient will continue to benefit from skilled PT services to modify and progress therapeutic interventions, address functional mobility deficits, address ROM deficits, address strength deficits, analyze and address soft tissue restrictions, analyze and cue movement patterns, analyze and modify body mechanics/ergonomics, and assess and modify postural abnormalities to attain remaining goals. [x]  See Plan of Care  []  See progress note/recertification  []  See Discharge Summary         Progress towards goals / Updated goals:  Goals for this certification period to be accomplished in 4 weeks:  1.  Pt will improve his left hip ext/abd strength to 3+/5 MMT to improve stability with ambulation. re-cert: remains, 3/5  2. Pt will amb community distances without his cane, demonstrating improved independence and stability.                          re-cert: \"I waddle when I don't have my cane\", initiated more single leg stabilization activities today               Current: requires cane 8-16-22    PLAN  []  Upgrade activities as tolerated     [x]  Continue plan of care  []  Update interventions per flow sheet       []  Discharge due to:_  []  Other:_      Rosa Kline, AREN 8/18/2022  8:39 AM    Future Appointments   Date Time Provider Lena Corrales   8/23/2022  9:00 AM Evelyn Castaneda, PT [facility]   8/25/2022  8:30 AM Lisbeth Worrell, PTA [facility]   8/30/2022  8:30 AM Vincent Garner [facility]   9/1/2022  8:30 AM [facility]   11/18/2022  9:30 AM Mariama Baires PA-C [facility]

## 2022-08-23 ENCOUNTER — HOSPITAL ENCOUNTER (OUTPATIENT)
Dept: PHYSICAL THERAPY | Age: 77
Discharge: HOME | End: 2022-08-23
Attending: PHYSICIAN ASSISTANT
Payer: MEDICARE

## 2022-08-23 PROCEDURE — 97110 THERAPEUTIC EXERCISES: CPT

## 2022-08-23 PROCEDURE — 97112 NEUROMUSCULAR REEDUCATION: CPT

## 2022-08-23 NOTE — PROGRESS NOTES
PT DAILY TREATMENT NOTE     Patient Name: Jason Tyson  Date:2022  : 1945  [x]  Patient  Verified  Payor: VA MEDICARE / Plan: VA MEDICARE PART A & B / Product Type: Medicare /    In time:9:00  Out time:9:53  Total Treatment Time (min): 48  Visit #: 3 of 8    Medicare/BCBS Only   Total Timed Codes (min):  43 1:1 Treatment Time:  43       Treatment Area: Right hip pain [M25.551]    SUBJECTIVE  Pain Level (0-10 scale): 0/10  Any medication changes, allergies to medications, adverse drug reactions, diagnosis change, or new procedure performed?: [x] No    [] Yes (see summary sheet for update)  Subjective functional status/changes:   [x] No changes reported  The patient denies changed upon arrival.    OBJECTIVE  Modality rationale: decrease edema, decrease inflammation, and decrease pain to improve the patients ability to improve ADL ease. Min Type Additional Details   10 [x]  Ice     []  heat  []  Ice massage  []  Laser   []  Anodyne Position: left sidelying  Location: right hip   [] Skin assessment post-treatment:  []intact []redness- no adverse reaction    []redness - adverse reaction:     28 min Therapeutic Exercise:  [x] See flow sheet :   Rationale: increase ROM and increase strength to improve the patients ability to improve ADL ease. 15 min Neuromuscular Re-education:  [x]  See flow sheet :   Rationale: increase ROM, increase strength, and improve coordination  to improve the patients ability to improve ADL ease.            With   [] TE   [] TA   [] neuro   [] other: Patient Education: [x] Review HEP    [] Progressed/Changed HEP based on:   [] positioning   [] body mechanics   [] transfers   [] heat/ice application    [] other:      Other Objective/Functional Measures:      Pain Level (0-10 scale) post treatment: 0/10    ASSESSMENT/Changes in Function: The chief impairment appears to be largely of right hip ABD strength noting instability in stance, trendelunberg in ambulation and appreciable concerning balance. He is progress, but progress is slow. Patient will continue to benefit from skilled PT services to modify and progress therapeutic interventions, address functional mobility deficits, address ROM deficits, address strength deficits, analyze and address soft tissue restrictions, analyze and cue movement patterns, analyze and modify body mechanics/ergonomics, assess and modify postural abnormalities, address imbalance/dizziness, and instruct in home and community integration to attain remaining goals. []  See Plan of Care  []  See progress note/recertification  []  See Discharge Summary         Progress towards goals / Updated goals:  Goals for this certification period to be accomplished in 4 weeks:  1. Pt will improve his left hip ext/abd strength to 3+/5 MMT to improve stability with ambulation. re-cert: remains, 3/5 MMT  2. Pt will amb community distances without his cane, demonstrating improved independence and stability.                          re-cert: \"I waddle when I don't have my cane\", initiated more single leg stabilization activities today               Current: Ambulates without cane, but notable trendelenburg without [facility] 8-16-22     PLAN  [x]  Upgrade activities as tolerated     []  Continue plan of care  []  Update interventions per flow sheet       []  Discharge due to:_  []  Other:_      Soco Kulkarni, PT 8/23/2022  9:04 AM    Future Appointments   Date Time Provider Lena Corrales   8/25/2022  8:30 AM Blanca Orlando PTA [facility]   8/30/2022  8:30 AM Ned Ferris PTA [facility]   9/1/2022  8:30 AM Blanca Orlando PTA [facility]   11/18/2022  9:30 AM Zane Ramos PA-C [facility]

## 2022-08-25 ENCOUNTER — HOSPITAL ENCOUNTER (OUTPATIENT)
Dept: PHYSICAL THERAPY | Age: 77
Discharge: HOME | End: 2022-08-25
Attending: PHYSICIAN ASSISTANT
Payer: MEDICARE

## 2022-08-25 PROCEDURE — 97110 THERAPEUTIC EXERCISES: CPT

## 2022-08-25 PROCEDURE — 97112 NEUROMUSCULAR REEDUCATION: CPT

## 2022-08-25 NOTE — PROGRESS NOTES
PT DAILY TREATMENT NOTE     Patient Name: Estela Pulido  Date:2022  : 1945  [x]  Patient  Verified  Payor: VA MEDICARE / Plan: VA MEDICARE PART A & B / Product Type: Medicare /    In time:8:25  Out time:9:25  Total Treatment Time (min): 60  Visit #: 4 of 8    Medicare/BCBS Only   Total Timed Codes (min):  50 1:1 Treatment Time:  50       Treatment Area: Right hip pain [M25.551]    SUBJECTIVE  Pain Level (0-10 scale): 0  Any medication changes, allergies to medications, adverse drug reactions, diagnosis change, or new procedure performed?: [x] No    [] Yes (see summary sheet for update)  Subjective functional status/changes:   [x] No changes reported      OBJECTIVE    Modality rationale: decrease inflammation and decrease pain to improve the patients ability to tolerate post workout soreness.    Min Type Additional Details    [] Estim:  []Unatt       []IFC  []Premod                        []Other:  []w/ice   []w/heat  Position:  Location:    [] Estim: []Att    []TENS instruct  []NMES                    []Other:  []w/US   []w/ice   []w/heat  Position:  Location:    []  Traction: [] Cervical       []Lumbar                       [] Prone          []Supine                       []Intermittent   []Continuous Lbs:  [] before manual  [] after manual    []  Ultrasound: []Continuous   [] Pulsed                           []1MHz   []3MHz W/cm2:  Location:    []  Iontophoresis with dexamethasone         Location: [] Take home patch   [] In clinic   10 [x]  Ice     []  heat  []  Ice massage  []  Laser   []  Anodyne Position: supine  Location: L/S    []  Laser with stim  []  Other:  Position:  Location:    []  Vasopneumatic Device    []  Right     []  Left  Pre-treatment girth:  Post-treatment girth:  Measured at (location):  Pressure:       [] lo [] med [] hi   Temperature: [] lo [] med [] hi   [x] Skin assessment post-treatment:  [x]intact []redness- no adverse reaction    []redness - adverse reaction: 32 min Therapeutic Exercise:  [x] See flow sheet :   Rationale: increase ROM, increase strength, and improve coordination to improve the patients ability to perform functional task with ease. 18 min Neuromuscular Re-education:  [x]  See flow sheet :   Rationale: increase strength, improve coordination, improve balance, and increase proprioception  to improve the patients ability to perform ADL's with ease. With   [] TE   [] TA   [] neuro   [] other: Patient Education: [x] Review HEP    [] Progressed/Changed HEP based on:   [] positioning   [] body mechanics   [] transfers   [] heat/ice application    [] other:      Other Objective/Functional Measures:   6\" dyn step ups fwd/lat     Pain Level (0-10 scale) post treatment: 0    ASSESSMENT/Changes in Function:   No SPC use throughout therex this visit to continue to wean pt off of AD. Step ups progressed to dynamic step ups to aid with improved glute activation with pt performing in the //: bars with B UE use for stability. Progressed reps and resistance to therex to continue to strengthen the right hip and glutes to aid with ease of ambulation ADL's. mild increase in L/S discomfort with progressed bridges however pt able to perform for 12 reps. Patient will continue to benefit from skilled PT services to modify and progress therapeutic interventions, address functional mobility deficits, address ROM deficits, address strength deficits, analyze and address soft tissue restrictions, analyze and cue movement patterns, analyze and modify body mechanics/ergonomics, and assess and modify postural abnormalities to attain remaining goals. [x]  See Plan of Care  []  See progress note/recertification  []  See Discharge Summary         Progress towards goals / Updated goals:  Goals for this certification period to be accomplished in 4 weeks:  1. Pt will improve his left hip ext/abd strength to 3+/5 MMT to improve stability with ambulation. re-cert: remains, 3/5 MMT  2. Pt will amb community distances without his cane, demonstrating improved independence and stability.                          re-cert: \"I waddle when I don't have my cane\", initiated more single leg stabilization activities today               Current: Ambulates without cane, but notable trendelenburg without [facility] 8-16-22    PLAN  []  Upgrade activities as tolerated     [x]  Continue plan of care  []  Update interventions per flow sheet       []  Discharge due to:_  []  Other:_      Preston Hoskins, AREN 8/25/2022  8:23 AM    Future Appointments   Date Time Provider Lena Corrales   8/25/2022  8:30 AM Marilyn Correia, [facility]   8/30/2022  8:30 AM [facility]   9/1/2022  8:30 AM [facility]   11/18/2022  9:30 AM Do Hayes PA-C [facility]

## 2022-08-30 ENCOUNTER — HOSPITAL ENCOUNTER (OUTPATIENT)
Dept: PHYSICAL THERAPY | Age: 77
Discharge: HOME | End: 2022-08-30
Attending: PHYSICIAN ASSISTANT
Payer: MEDICARE

## 2022-08-30 PROCEDURE — 97112 NEUROMUSCULAR REEDUCATION: CPT

## 2022-08-30 PROCEDURE — 97110 THERAPEUTIC EXERCISES: CPT

## 2022-08-30 NOTE — PROGRESS NOTES
PT DAILY TREATMENT NOTE     Patient Name: Marc Daniel  Date:2022  : 1945  [x]  Patient  Verified  Payor: VA MEDICARE / Plan: VA MEDICARE PART A & B / Product Type: Medicare /    In time:8:30  Out time:9:25  Total Treatment Time (min): 55  Visit #: 5 of 8    Medicare/BCBS Only   Total Timed Codes (min):  45 1:1 Treatment Time:  45       Treatment Area: Right hip pain [M25.551]    SUBJECTIVE  Pain Level (0-10 scale): 0/10  Any medication changes, allergies to medications, adverse drug reactions, diagnosis change, or new procedure performed?: [x] No    [] Yes (see summary sheet for update)  Subjective functional status/changes:   [] No changes reported  Pt denies pain currently. Pt reports compliance with HEP. OBJECTIVE    Modality rationale: decrease inflammation and decrease pain to improve the patients ability to perform ADLs with increased ease.     Min Type Additional Details    [] Estim:  []Unatt       []IFC  []Premod                        []Other:  []w/ice   []w/heat  Position:  Location:    [] Estim: []Att    []TENS instruct  []NMES                    []Other:  []w/US   []w/ice   []w/heat  Position:  Location:    []  Traction: [] Cervical       []Lumbar                       [] Prone          []Supine                       []Intermittent   []Continuous Lbs:  [] before manual  [] after manual    []  Ultrasound: []Continuous   [] Pulsed                           []1MHz   []3MHz W/cm2:  Location:    []  Iontophoresis with dexamethasone         Location: [] Take home patch   [] In clinic   10 [x]  Ice     []  heat  []  Ice massage  []  Laser   []  Anodyne Position:supine  Location:L/S    []  Laser with stim  []  Other:  Position:  Location:    []  Vasopneumatic Device    []  Right     []  Left  Pre-treatment girth:  Post-treatment girth:  Measured at (location):  Pressure:       [] lo [] med [] hi   Temperature: [] lo [] med [] hi   [] Skin assessment post-treatment:  []intact []redness- no adverse reaction    []redness - adverse reaction:     35 min Therapeutic Exercise:  [x] See flow sheet :   Rationale: increase ROM and increase strength to improve the patients ability to perform ADLs with increased ease. 10 min Neuromuscular Re-education:  [x]  See flow sheet :   Rationale: increase strength, improve coordination, and increase proprioception  to improve the patients ability to perform ADLs with increased ease. With   [] TE   [] TA   [] neuro   [] other: Patient Education: [x] Review HEP    [] Progressed/Changed HEP based on:   [] positioning   [] body mechanics   [] transfers   [] heat/ice application    [] other:      Other Objective/Functional Measures:      Pain Level (0-10 scale) post treatment: 0/10    ASSESSMENT/Changes in Function: Pt continues to demonstrate bilateral LE weakness with inability to perform SLS without UE assistance. Pt continues to have difficulty ambulating without AD demonstrating a step to gait pattern. Patient will continue to benefit from skilled PT services to modify and progress therapeutic interventions, address functional mobility deficits, address ROM deficits, address strength deficits, analyze and address soft tissue restrictions, analyze and cue movement patterns, and analyze and modify body mechanics/ergonomics to attain remaining goals. []  See Plan of Care  []  See progress note/recertification  []  See Discharge Summary         Progress towards goals / Updated goals:  Goals for this certification period to be accomplished in 4 weeks:  1. Pt will improve his left hip ext/abd strength to 3+/5 MMT to improve stability with ambulation. re-cert: remains, 3/5 MMT  2. Pt will amb community distances without his cane, demonstrating improved independence and stability.                          re-cert: \"I waddle when I don't have my cane\", initiated more single leg stabilization activities today Current: Ambulates without cane, but notable trendelenburg without [facility] 8-16-22    PLAN  []  Upgrade activities as tolerated     [x]  Continue plan of care  []  Update interventions per flow sheet       []  Discharge due to:_  []  Other:_      Noah Jaffe PTA 8/30/2022  8:46 AM    Future Appointments   Date Time Provider Lena Corrales   9/1/2022  8:30 AM [facility]   11/18/2022  9:30 AM Lissy Lombardi PA-C [facility]

## 2022-09-01 ENCOUNTER — HOSPITAL ENCOUNTER (OUTPATIENT)
Dept: PHYSICAL THERAPY | Age: 77
Discharge: HOME | End: 2022-09-01
Attending: PHYSICIAN ASSISTANT
Payer: MEDICARE

## 2022-09-01 PROCEDURE — 97110 THERAPEUTIC EXERCISES: CPT

## 2022-09-01 PROCEDURE — 97112 NEUROMUSCULAR REEDUCATION: CPT

## 2022-09-01 NOTE — PROGRESS NOTES
PT DAILY TREATMENT NOTE     Patient Name: Edgard Peace  Date:2022  : 1945  [x]  Patient  Verified  Payor: VA MEDICARE / Plan: VA MEDICARE PART A & B / Product Type: Medicare /    In time:8:30  Out time:9:24  Total Treatment Time (min): 47  Visit #: 6 of 8    Medicare/BCBS Only   Total Timed Codes (min):  44 1:1 Treatment Time:  44       Treatment Area: Right hip pain [M25.551]    SUBJECTIVE  Pain Level (0-10 scale): 0  Any medication changes, allergies to medications, adverse drug reactions, diagnosis change, or new procedure performed?: [x] No    [] Yes (see summary sheet for update)  Subjective functional status/changes:   [] No changes reported  Pt reports no pain. OBJECTIVE    Modality rationale: decrease inflammation and decrease pain to improve the patients ability to perform ADL's with ease.    Min Type Additional Details    [] Estim:  []Unatt       []IFC  []Premod                        []Other:  []w/ice   []w/heat  Position:  Location:    [] Estim: []Att    []TENS instruct  []NMES                    []Other:  []w/US   []w/ice   []w/heat  Position:  Location:    []  Traction: [] Cervical       []Lumbar                       [] Prone          []Supine                       []Intermittent   []Continuous Lbs:  [] before manual  [] after manual    []  Ultrasound: []Continuous   [] Pulsed                           []1MHz   []3MHz W/cm2:  Location:    []  Iontophoresis with dexamethasone         Location: [] Take home patch   [] In clinic   10 [x]  Ice     []  heat  []  Ice massage  []  Laser   []  Anodyne Position:supine w/wedge  Location: L/S, Right hip    []  Laser with stim  []  Other:  Position:  Location:    []  Vasopneumatic Device    []  Right     []  Left  Pre-treatment girth:  Post-treatment girth:  Measured at (location):  Pressure:       [] lo [] med [] hi   Temperature: [] lo [] med [] hi   [] Skin assessment post-treatment:  []intact []redness- no adverse reaction []redness - adverse reaction:         34 min Therapeutic Exercise:  [x] See flow sheet :   Rationale: increase ROM, increase strength, and improve coordination to improve the patients ability to perform ADL's with ease. 10 min Neuromuscular Re-education:  [x]  See flow sheet :   Rationale: increase strength, improve coordination, improve balance, and increase proprioception  to improve the patients ability to perform ADL's with ease. With   [] TE   [] TA   [] neuro   [] other: Patient Education: [x] Review HEP    [] Progressed/Changed HEP based on:   [] positioning   [] body mechanics   [] transfers   [] heat/ice application    [] other:      Other Objective/Functional Measures:      Pain Level (0-10 scale) post treatment: 0    ASSESSMENT/Changes in Function:   Continued weakness in the right hip abductor with pt challenged with lateral step ups on the right. Pt required minor tactile cueing for stability but was able to improve some as he worked through the exercise requiring SBA. Little change in right hip and glute strength since last testing with pt continuing to display weakness and instability in the right glute med. Due to limited change since last PN, the pt is to have one to two more appointments to prepare for discharge to [facility] self care at home. Patient will continue to benefit from skilled PT services to modify and progress therapeutic interventions, address functional mobility deficits, address ROM deficits, address strength deficits, analyze and address soft tissue restrictions, analyze and cue movement patterns, analyze and modify body mechanics/ergonomics, and assess and modify postural abnormalities to attain remaining goals. [x]  See Plan of Care  []  See progress note/recertification  []  See Discharge Summary         Progress towards goals / Updated goals:  Goals for this certification period to be accomplished in 4 weeks:  1.  Pt will improve his left hip ext/abd strength to 3+/5 MMT to improve stability with ambulation. re-cert: remains, 3/5 MMT   Current: no change 9/1/22 right hip abd/ext 3/5  2. Pt will amb community distances without his cane, demonstrating improved independence and stability.                          re-cert: \"I waddle when I don't have my cane\", initiated more single leg stabilization activities today               Current: Ambulates without cane, but notable trendelenburg without [facility] 8-16-22    PLAN  []  Upgrade activities as tolerated     [x]  Continue plan of care  []  Update interventions per flow sheet       []  Discharge due to:_  []  Other:_      Jakob Morales, AREN 9/1/2022  8:34 AM    Future Appointments   Date Time Provider Lena Corrales   11/18/2022  9:30 AM Gavi Regan PA-C [facility]

## 2022-09-06 ENCOUNTER — HOSPITAL ENCOUNTER (OUTPATIENT)
Dept: PHYSICAL THERAPY | Age: 77
Discharge: HOME | End: 2022-09-06
Attending: PHYSICIAN ASSISTANT
Payer: MEDICARE

## 2022-09-06 PROCEDURE — 97110 THERAPEUTIC EXERCISES: CPT

## 2022-09-06 PROCEDURE — 97112 NEUROMUSCULAR REEDUCATION: CPT

## 2022-09-06 NOTE — PROGRESS NOTES
PT DAILY TREATMENT NOTE     Patient Name: Keyon Martin  Date:2022  : 1945  [x]  Patient  Verified  Payor: [place] MEDICARE / Plan: [place] MEDICARE PART A & B / Product Type: Medicare /    In time:7:00  Out time:7:50  Total Treatment Time (min): 50  Visit #: 7 of 8    Medicare/BCBS Only   Total Timed Codes (min):  40 1:1 Treatment Time:  40       Treatment Area: Right hip pain [M25.551]    SUBJECTIVE  Pain Level (0-10 scale): 0/10  Any medication changes, allergies to medications, adverse drug reactions, diagnosis change, or new procedure performed?: [x] No    [] Yes (see summary sheet for update)  Subjective functional status/changes:   [] No changes reported  Pt reports no new complaints of pain. Pt reports compliance with HEP. OBJECTIVE    Modality rationale: decrease inflammation and decrease pain to improve the patients ability to perform ADLs with increased ease.     Min Type Additional Details    [] Estim:  []Unatt       []IFC  []Premod                        []Other:  []w/ice   []w/heat  Position:  Location:    [] Estim: []Att    []TENS instruct  []NMES                    []Other:  []w/US   []w/ice   []w/heat  Position:  Location:    []  Traction: [] Cervical       []Lumbar                       [] Prone          []Supine                       []Intermittent   []Continuous Lbs:  [] before manual  [] after manual    []  Ultrasound: []Continuous   [] Pulsed                           []1MHz   []3MHz W/cm2:  Location:    []  Iontophoresis with dexamethasone         Location: [] Take home patch   [] In clinic    []  Ice     []  heat  []  Ice massage  []  Laser   []  Anodyne Position:  Location:    []  Laser with stim  []  Other:  Position:  Location:    []  Vasopneumatic Device    []  Right     []  Left  Pre-treatment girth:  Post-treatment girth:  Measured at (location):  Pressure:       [] lo [] med [] hi   Temperature: [] lo [] med [] hi   [] Skin assessment post-treatment:  []intact []redness- no adverse reaction    []redness - adverse reaction:     30 min Therapeutic Exercise:  [x] See flow sheet :   Rationale: increase ROM and increase strength to improve the patients ability to perform ADLs with increased ease. 10 min Neuromuscular Re-education:  [x]  See flow sheet :   Rationale: increase strength, improve coordination, and increase proprioception  to improve the patients ability to perform ADLs with increased ease. With   [] TE   [] TA   [] neuro   [] other: Patient Education: [x] Review HEP    [] Progressed/Changed HEP based on:   [] positioning   [] body mechanics   [] transfers   [] heat/ice application    [] other:      Other Objective/Functional Measures: Issued updated HEP. Pain Level (0-10 scale) post treatment: 0/10    ASSESSMENT/Changes in Function: Pt continues to demonstrate limited progress toward goals due to glute med weakness. Pt will be DC'd to HEP next visit. HEP issued today. Patient will continue to benefit from skilled PT services to modify and progress therapeutic interventions, address functional mobility deficits, address ROM deficits, address strength deficits, analyze and address soft tissue restrictions, analyze and cue movement patterns, analyze and modify body mechanics/ergonomics, and assess and modify postural abnormalities to attain remaining goals. []  See Plan of Care  []  See progress note/recertification  []  See Discharge Summary         Progress towards goals / Updated goals:  Goals for this certification period to be accomplished in 4 weeks:  1. Pt will improve his left hip ext/abd strength to 3+/5 MMT to improve stability with ambulation. re-cert: remains, 3/5 MMT              Current: no change 9/1/22 right hip abd/ext 3/5  2. Pt will amb community distances without his cane, demonstrating improved independence and stability.                          re-cert: \"I waddle when I don't have my cane\", initiated more single leg stabilization activities today               Current: Ambulates without cane, but notable trendelenburg without SPC 8-16-22; remains.  09/06/2022    PLAN  []  Upgrade activities as tolerated     [x]  Continue plan of care  []  Update interventions per flow sheet       []  Discharge due to:_  []  Other:_      Martínez Kc, PTA 9/6/2022  7:08 AM    Future Appointments   Date Time Provider Lena Allredi   9/9/2022  7:00 AM Donnamarie Osler, PT [facility]   11/18/2022  9:30 AM Cristino Jo PA-C [facility]

## 2022-09-09 ENCOUNTER — HOSPITAL ENCOUNTER (OUTPATIENT)
Dept: PHYSICAL THERAPY | Age: 77
Discharge: HOME | End: 2022-09-09
Attending: PHYSICIAN ASSISTANT
Payer: MEDICARE

## 2022-09-09 PROCEDURE — 97110 THERAPEUTIC EXERCISES: CPT

## 2022-09-09 PROCEDURE — 97112 NEUROMUSCULAR REEDUCATION: CPT

## 2022-09-09 NOTE — PROGRESS NOTES
PT DISCHARGE DAILY NOTE AND [id number]    Date:2022     Patient name: Fouzia Golden Start of Care: 2022   Referral source: Samuel Callejas : 1945   Medical/Treatment Diagnosis: Right hip pain [M25.551]  Payor: [place] MEDICARE / Plan: VA MEDICARE PART A & B / Product Type: Medicare /  Onset Date:2022      Prior Hospitalization: see medical history Provider#: [id number]   Medications: Verified on Patient Summary List     Comorbidities: left CALEB , LBP with injections, DDD, stenosis, thyroid problems, hearing impairment, BMI > 30    Prior Level of Function: Pt has been limited with ADL ease due to hip pain over the last two years       Visits from Start of Care: 23    Missed Visits: 0    Reporting Period : 08-10-22 to 22    [x]  Patient  Verified  Payor: VA MEDICARE / Plan: VA MEDICARE PART A & B / Product Type: Medicare /    In time:0700  Out time:  Total Treatment Time (min):   Visit #: 8 of 8    Medicare/BCBS Only   Total Timed Codes (min):  43 1:1 Treatment Time:  43       SUBJECTIVE  Pain Level (0-10 scale): 0  Any medication changes, allergies to medications, adverse drug reactions, diagnosis change, or new procedure performed?: [x] No    [] Yes (see summary sheet for update)  Subjective functional status/changes:   [] No changes reported  The pt reports no pain but some stiffness first thing in the morning.      OBJECTIVE    Modality rationale: decrease inflammation to improve the patients ability to perform ADL   Min Type Additional Details    [] Estim:  []Unatt       []IFC  []Premod                        []Other:  []w/ice   []w/heat  Position:  Location:    [] Estim: []Att    []TENS instruct  []NMES                    []Other:  []w/US   []w/ice   []w/heat  Position:  Location:    []  Traction: [] Cervical       []Lumbar                       [] Prone          []Supine                       []Intermittent   []Continuous Lbs:  [] before manual  [] after manual    [] Ultrasound: []Continuous   [] Pulsed                           []1MHz   []3MHz W/cm2:  Location:    []  Iontophoresis with dexamethasone         Location: [] Take home patch   [] In clinic   10 [x]  Ice     []  heat  []  Ice massage  []  Laser   []  Anodyne Position:supine  Location:right hip    []  Laser with stim  []  Other:  Position:  Location:    []  Vasopneumatic Device    []  Right     []  Left  Pre-treatment girth:  Post-treatment girth:  Measured at (location):  Pressure:       [] lo [] med [] hi   Temperature: [] lo [] med [] hi   [] Skin assessment post-treatment:  []intact []redness- no adverse reaction    []redness - adverse reaction:         30 min Therapeutic Exercise:  [x] See flow sheet :   Rationale: increase ROM and increase strength to improve the patients ability to perform ADL      13 min Neuromuscular Re-education:  [x]  See flow sheet :   Rationale: increase strength, improve coordination, improve balance, and increase proprioception  to improve the patients ability to perform ADL            With   [] TE   [] TA   [] neuro   [] other: Patient Education: [x] Review HEP    [] Progressed/Changed HEP based on:   [] positioning   [] body mechanics   [] transfers   [] heat/ice application    [x] other:      Other Objective/Functional Measures:       Pain Level (0-10 scale) post treatment: 0    Summary of Care:         Progress towards goals:  Goals for this certification period to be accomplished in 4 weeks:  1. Pt will improve his left hip ext/abd strength to 3+/5 MMT to improve stability with ambulation. re-cert: remains, 3/5 MMT              Current: Met left hip, right hip progressed 3+/5  2. Pt will amb community distances without his cane, demonstrating improved independence and stability.                          re-cert: \"I waddle when I don't have my cane\", initiated more single leg stabilization activities today               Current: Ambulates without cane, but notable trendelenburg without SPC remains    ASSESSMENT/Changes in Function: The pt has progressed well since starting PT and reports no c/o pain. He is ambulating with use of a cane independently. Hip strength has improved but weakness is still present. He has been encouraged to continue with HEP.       Thank you for this referral!     PLAN  [x]Discontinue therapy: [x]Patient has reached or is progressing toward set goals      []Patient is non-compliant or has abdicated      []Due to lack of appreciable progress towards set Rochelle 73, PT 9/9/2022  6:58 AM

## 2022-10-07 NOTE — HOME HEALTH
Skilled reason for visit: wound care     Caregiver involvement: karel    Medications reviewed and all medications are available in the home this visit. The following education was provided regarding medications:  karel.    MD notified of any discrepancies/look a-like medications/medication interactions: karel  Medications are effective at this time. Home health supplies by type and quantity ordered/delivered this visit include: karel    Patient education provided this visit: patient made aware to monitor for s/s of infection [increased swelling, increased redness around site, increased pain, foul smelling drainage, fever] aware who to report to/when.     Sharps education provided: karel    Patient level of understanding of education provided: pt verbalizes understanding of all education provided during visit      Skilled Care Performed this visit: woud care     Patient response to procedure performed:  tolerated without complaints of pain       Agency Progress toward goals: remain out of hospital and free of infection        Patient's Progress towards personal goals: remain out of hospital and free of infection      Home exercise program: turn and erposition every two hours to relieve pressure on sacral area andbony prominences     Continued need for the following skills: Nursing    Plan for next visit: wound care     Patient and/or caregiver notified and agrees to changes in the Plan of Care karel    The following discharge planning was discussed with the pt/caregiver: karel Benzoyl Peroxide Pregnancy And Lactation Text: This medication is Pregnancy Category C. It is unknown if benzoyl peroxide is excreted in breast milk.

## 2022-11-18 ENCOUNTER — OFFICE VISIT (OUTPATIENT)
Dept: ORTHOPEDIC SURGERY | Age: 77
End: 2022-11-18
Payer: MEDICARE

## 2022-11-18 DIAGNOSIS — M25.551 RIGHT HIP PAIN: ICD-10-CM

## 2022-11-18 DIAGNOSIS — Z96.641 STATUS POST RIGHT HIP REPLACEMENT: Primary | ICD-10-CM

## 2022-11-18 PROCEDURE — G8432 DEP SCR NOT DOC, RNG: HCPCS | Performed by: PHYSICIAN ASSISTANT

## 2022-11-18 PROCEDURE — 1101F PT FALLS ASSESS-DOCD LE1/YR: CPT | Performed by: PHYSICIAN ASSISTANT

## 2022-11-18 PROCEDURE — 99213 OFFICE O/P EST LOW 20 MIN: CPT | Performed by: PHYSICIAN ASSISTANT

## 2022-11-18 PROCEDURE — G8417 CALC BMI ABV UP PARAM F/U: HCPCS | Performed by: PHYSICIAN ASSISTANT

## 2022-11-18 PROCEDURE — G8536 NO DOC ELDER MAL SCRN: HCPCS | Performed by: PHYSICIAN ASSISTANT

## 2022-11-18 PROCEDURE — G8427 DOCREV CUR MEDS BY ELIG CLIN: HCPCS | Performed by: PHYSICIAN ASSISTANT

## 2022-11-18 PROCEDURE — 3017F COLORECTAL CA SCREEN DOC REV: CPT | Performed by: PHYSICIAN ASSISTANT

## 2022-11-18 PROCEDURE — 1123F ACP DISCUSS/DSCN MKR DOCD: CPT | Performed by: PHYSICIAN ASSISTANT

## 2022-11-18 RX ORDER — AMOXICILLIN 500 MG/1
CAPSULE ORAL
Qty: 4 CAPSULE | Refills: 3 | Status: SHIPPED | OUTPATIENT
Start: 2022-11-18

## 2022-11-18 NOTE — PROGRESS NOTES
[address] [place] [address]  [phone]           Patient: Jessie Lopez                MRN: [id number]       SSN: [id number]  YOB: 1945        AGE: 78 y.o. SEX: male  There is no height or weight on file to calculate BMI. PCP: Nani Frazier MD  11/18/22      This office note has been dictated. REVIEW OF SYSTEMS:  Constitutional: Negative for fever, chills, weight loss and malaise/fatigue. HENT: Negative. Eyes: Negative. Respiratory: Negative. Cardiovascular: Negative. Gastrointestinal: No bowel incontinence or constipation. Genitourinary: No bladder incontinence or saddle anesthesia. Skin: Negative. Neurological: Negative. Endo/Heme/Allergies: Negative. Psychiatric/Behavioral: Negative. Musculoskeletal: As per HPI above. Past Medical History:   Diagnosis Date    Arrhythmia 2006    AFib. before diagnosis of Graves Disease    Chronic right hip pain     Hypertension     Sleep apnea     uses CPAP    Spinal stenosis     Thyroid disease 06/09/2006    Killed Thyroid with Iodine 131 had Graves Disease         Current Outpatient Medications:     oxyCODONE-acetaminophen (PERCOCET 10)  mg per tablet, Take 1 Tablet by mouth every six (6) hours as needed for Pain. take for up to 7 days. Max daily amount 8 tabs  , Disp: , Rfl:     cholecalciferol (Vitamin D3) (1000 Units /25 mcg) tablet, Take 1,000 Units by mouth three (3) times daily (with meals). , Disp: , Rfl:     metoprolol succinate (TOPROL-XL) 100 mg tablet, Take 100 mg by mouth nightly., Disp: , Rfl:     levothyroxine (SYNTHROID) 150 mcg tablet, Take 150 mcg by mouth nightly. Takes  Synthroid at 2100, Disp: , Rfl:     ROSUVASTATIN CALCIUM (CRESTOR PO), Take 10 mg by mouth daily (after lunch). , Disp: , Rfl:     Allergies   Allergen Reactions    Atorvastatin Myalgia    Statins-Hmg-Coa Reductase Inhibitors Myalgia    Stings [Sting, Bee] Swelling       Social History     Socioeconomic History    Marital status:      Spouse name: Not on file    Number of children: Not on file    Years of education: Not on file    Highest education level: Not on file   Occupational History    Not on file   Tobacco Use    Smoking status: Former    Smokeless tobacco: Never    Tobacco comments:     occas Cigar    Vaping Use    Vaping Use: Never used   Substance and Sexual Activity    Alcohol use: Yes     Comment: occas    Drug use: Never    Sexual activity: Not on file   Other Topics Concern    Not on file   Social History Narrative    Not on file     Social Determinants of Health     Financial Resource Strain: Not on file   Food Insecurity: Not on file   Transportation Needs: Not on file   Physical Activity: Not on file   Stress: Not on file   Social Connections: Not on file   Intimate Partner Violence: Not on file   Housing Stability: Not on file       Past Surgical History:   Procedure Laterality Date    HX CATARACT REMOVAL Bilateral 9/2021 , 10/2021    HX COLONOSCOPY      [address]    HX HIP REPLACEMENT      NC SINUS SURGERY PROC UNLISTED  2012    NC TOTAL HIP ARTHROPLASTY Left 01/2010    by Dr. Bharath Funes.       Patient seen evaluated today for his right total hip replacement. He is now approximate 6-month status post surgery doing quite well. Is very pleased with the results of the placement. He still has a little bit of a limp and is working on his exercise program.  Uses a cane on occasion. He has no start of pain. No feelings of instability. He has no pain. Patient denies recent fevers, chills, chest pain, SOB, or injuries. No recent systemic changes noted. A 12-point review of systems is performed today. Pertinent positives are noted. All other systems reviewed and otherwise are negative. Physical exam: General: Alert and oriented x3, nad.  well-developed, well nourished. normal affect, AF.   NC/AT, EOMI, neck supple, trachea midline, no JVD present. Breathing is non-labored. Examination of the right hip reveals skin intact. The surgical wound healed nicely. There is no erythema or ecchymosis noted. There are no signs for infection or cellulitis present. He has no pain with rotation of the hip. There is no pain to palpation of the trochanter bursa. Leg lengths are perfect. Abduction strength is 5 - on the right and 5 out of 5 on the left. Assessment: Status post right total hip replacement    Plan: At this point, we discussed a home exercise program.  He will continue with standing abduction exercises and convert to lying abduction exercises to work up to 5 pounds of weight. He will do this about 3 times a week. We will see him back in the office in 6 months time for evaluation. He will call with any questions or concerns that should arise. We will obtain x-rays of his hip upon his return.               JR Rafat ALMANZA PA-C, ATC

## 2022-12-08 ENCOUNTER — OFFICE VISIT (OUTPATIENT)
Dept: ORTHOPEDIC SURGERY | Age: 77
End: 2022-12-08
Payer: MEDICARE

## 2022-12-08 VITALS
TEMPERATURE: 97.9 F | RESPIRATION RATE: 18 BRPM | HEART RATE: 58 BPM | OXYGEN SATURATION: 97 % | WEIGHT: 303 LBS | HEIGHT: 73 IN | BODY MASS INDEX: 40.16 KG/M2

## 2022-12-08 DIAGNOSIS — M51.36 DDD (DEGENERATIVE DISC DISEASE), LUMBAR: Primary | ICD-10-CM

## 2022-12-08 DIAGNOSIS — M48.061 SPINAL STENOSIS OF LUMBAR REGION, UNSPECIFIED WHETHER NEUROGENIC CLAUDICATION PRESENT: ICD-10-CM

## 2022-12-08 DIAGNOSIS — M51.26 HNP (HERNIATED NUCLEUS PULPOSUS), LUMBAR: ICD-10-CM

## 2022-12-08 DIAGNOSIS — M54.50 LUMBAR PAIN: ICD-10-CM

## 2022-12-08 PROCEDURE — G8427 DOCREV CUR MEDS BY ELIG CLIN: HCPCS | Performed by: PHYSICAL MEDICINE & REHABILITATION

## 2022-12-08 PROCEDURE — 1101F PT FALLS ASSESS-DOCD LE1/YR: CPT | Performed by: PHYSICAL MEDICINE & REHABILITATION

## 2022-12-08 PROCEDURE — 3017F COLORECTAL CA SCREEN DOC REV: CPT | Performed by: PHYSICAL MEDICINE & REHABILITATION

## 2022-12-08 PROCEDURE — G8536 NO DOC ELDER MAL SCRN: HCPCS | Performed by: PHYSICAL MEDICINE & REHABILITATION

## 2022-12-08 PROCEDURE — 99213 OFFICE O/P EST LOW 20 MIN: CPT | Performed by: PHYSICAL MEDICINE & REHABILITATION

## 2022-12-08 PROCEDURE — 1123F ACP DISCUSS/DSCN MKR DOCD: CPT | Performed by: PHYSICAL MEDICINE & REHABILITATION

## 2022-12-08 PROCEDURE — G8417 CALC BMI ABV UP PARAM F/U: HCPCS | Performed by: PHYSICAL MEDICINE & REHABILITATION

## 2022-12-08 PROCEDURE — G8432 DEP SCR NOT DOC, RNG: HCPCS | Performed by: PHYSICAL MEDICINE & REHABILITATION

## 2022-12-08 NOTE — LETTER
12/8/2022    Patient: Wendy Reed   YOB: 1945   Date of Visit: 12/8/2022     Loc Medley MD  [address]  [address] [facility] [address]  Via Fax: [phone]    Dear Loc Medley MD,      Thank you for referring Mr. Balaji Howell to [facility] for evaluation. My notes for this consultation are attached. If you have questions, please do not hesitate to call me. I look forward to following your patient along with you.       Sincerely,    Shanti Chavez MD

## 2022-12-08 NOTE — PROGRESS NOTES
[facility]  [address]  [facility], [facility] [address]  Phone: [phone]  Fax: [phone]        PROGRESS NOTE      HISTORY OF PRESENT ILLNESS:  The patient is a 78 y.o. male and was seen today for follow up of pain free office visit. Patient was previously seen for increase in right sided low back pain into the right buttocks radiating into the RLE in a S1 to the knee after prolonged walking while shopping. Previously seen for lateral right hip pain. Previously seen for low back pain that radiates into the RLE in a S1 distribution to the knee after injection of right hip x 3/2021. His pain is aggravated through activity and by lying down at night. Previously seen for low back pain, previously radiating into the right groin and RLE to the knee. Previously, he was seen for paraesthesias in between the shoulder blades radiating around into the chest. Previously, he was seen for complaints of low back pain. He denies radicular symptoms at this time. Previously, he had c/o low back pain into the BLE radiating circumferentially to the calves with symptoms consistent for stenosis. He reported an increase in pain at night. His groin pain is exacerbated by hip flexion and extention and with activities such as putting on shoes and socks. He reports a loss in ROM of his right hip. He reports lumbar epidural to the L3-L4 interlaminar space on 8/25/15 provided significant relief. Pt underwent a right hip intraarticular injection with significant benefit. Pt underwent epidural L3-4 on 9/1/2020 with significant relief. Pt underwent an epidural L3-4 on 7/27/2021 with good relief. Pt reports he received a right hip injection in 9/2021 with good relief. He states he has not had any flare ups of severe pain. Pt reports he was able to reduce his Topamax dose to 50 mg qhs since 1/2017 without an increase in pain.  He states low back and BLE pain has essentially resolved with rare increase in pain with activity. Pt previously underwent a left total hip replacement by Dr. Ashwin Mott. Note from Dr. Ashwin Mott dated 8/7/2020 indicating patient was seen with c/o right sided hip and groin pain. Pt has difficulty putting shoes and socks on, and pain at night. Pt has noticed more radicular pain into his RLE. 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 Indicated he may need a RT hip replacement but thought he should try a hip injection first. Ordered a L spine MRI. He notes improvement in standing and walking intolerance. Pt underwent an epidural at L3-4 on 1/18/2022 with benefit, 100% improvement of low back pain and RLE pain. Pt reports he received a right hip injection through Dr. Ashwin Mott on 2/15/2022 w/ relief. Preliminary reading of Thoracic plain films: Limited study due to body habitus. Degenerative changes noted throughout the thoracic spine. No acute pathology identified. Preliminary reading of Pelvis plain films dated 7/2/2020 revealed: Moderately severe joint space narrowing throughout the right hip. L spine MRI dated 8/19/2020 films independently reviewed. Per report, diffuse disc bulge with superimposed left paracentral inferiorly directed extrusion at L2-L3, contributing to moderate spinal canal narrowing at the L2-L3 disc level and just below. Additional underlying degenerative disc disease and facet arthropathy also produce the following: Moderate to severe spinal canal stenosis at L4-L5. Moderate spinal canal stenosis at L3-L4 and L5-S1. 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 At his last clinic appointment, I advised the patient to continue with his scheduled right hip replacement with Dr. Ashwin Mott. The patient returns today with low back pain without radiculopathy. He rates his pain 6/10, previously 0/10.  Patient had no pain until about 2 months ago, and it came back with the PT for his right hip. His pain is exacerbated by standing and walking, relieved with sitting. Patient has Positive Shopping cart sign. Pt denies change in bowel or bladder habits. Patient denies DM. Patient had a right hip replacement in 5/24/2022 by Dr. Harry Nguyen.  reviewed. Body mass index is 39.98 kg/m². PCP: Jen Ayala MD      Past Medical History:   Diagnosis Date    Arrhythmia 2006    AFib. before diagnosis of Graves Disease    Chronic right hip pain     Hypertension     Sleep apnea     uses CPAP    Spinal stenosis     Thyroid disease 06/09/2006    Killed Thyroid with Iodine 131 had Graves Disease        Social History     Socioeconomic History    Marital status:      Spouse name: Not on file    Number of children: Not on file    Years of education: Not on file    Highest education level: Not on file   Occupational History    Not on file   Tobacco Use    Smoking status: Former    Smokeless tobacco: Never    Tobacco comments:     occas Cigar    Vaping Use    Vaping Use: Never used   Substance and Sexual Activity    Alcohol use: Yes     Comment: occas    Drug use: Never    Sexual activity: Not on file   Other Topics Concern    Not on file   Social History Narrative    Not on file     Social Determinants of Health     Financial Resource Strain: Not on file   Food Insecurity: Not on file   Transportation Needs: Not on file   Physical Activity: Not on file   Stress: Not on file   Social Connections: Not on file   Intimate Partner Violence: Not on file   Housing Stability: Not on file       Current Outpatient Medications   Medication Sig Dispense Refill    amoxicillin (AMOXIL) 500 mg capsule Take 4 PO 1 hour prior to dental appointment 4 Capsule 3    metoprolol succinate (TOPROL-XL) 100 mg tablet Take 100 mg by mouth nightly. levothyroxine (SYNTHROID) 150 mcg tablet Take 150 mcg by mouth nightly.  Takes  Synthroid at 2100      ROSUVASTATIN CALCIUM (CRESTOR PO) Take 10 mg by mouth daily (after lunch). oxyCODONE-acetaminophen (PERCOCET 10)  mg per tablet Take 1 Tablet by mouth every six (6) hours as needed for Pain. take for up to 7 days. Max daily amount 8 tabs    (Patient not taking: Reported on 12/8/2022)         Allergies   Allergen Reactions    Atorvastatin Myalgia    Statins-Hmg-Coa Reductase Inhibitors Myalgia    Stings [Sting, Bee] Swelling          PHYSICAL EXAMINATION    Visit Vitals  Pulse (!) 58   Temp 97.9 °F (36.6 °C) (Temporal)   Resp 18   Ht 6' 1\" (1.854 m)   Wt 303 lb (137.4 kg)   SpO2 97%   BMI 39.98 kg/m²       CONSTITUTIONAL: NAD, A&O x 3  SENSATION: Intact to light touch throughout  RANGE OF MOTION: The patient has full passive range of motion in all four extremities. MOTOR:  Straight Leg Raise: Negative, bilateral    Ambulates with a single point cane. Hip Flex Knee Ext Knee Flex Ankle DF GTE Ankle PF Tone   Right +4/5 +4/5 +4/5 +4/5 +4/5 +4/5 +4/5   Left +4/5 +4/5 +4/5 +4/5 +4/5 +4/5 +4/5       ASSESSMENT   Diagnoses and all orders for this visit:    1. DDD (degenerative disc disease), lumbar    2. HNP (herniated nucleus pulposus), lumbar    3. Lumbar pain    4. Spinal stenosis of lumbar region, unspecified whether neurogenic claudication present        IMPRESSION AND PLAN:  Patient returns to the office today with c/o low back pain without radiculopathy. Multiple treatment options were discussed. Patient is not interested in surgery at this time. I offered neuropathic medication, pt declined. Patient is interested in an injection. I will order a lumbar epidural L3-4 interlaminar space. Patient is neurologically intact. I will see the patient back after injection or earlier if needed. Written by Alicia Betancourt, as dictated by Abdirashid Brownlee MD  I examined the patient, reviewed and agree with the note.

## 2022-12-22 ENCOUNTER — HOSPITAL ENCOUNTER (OUTPATIENT)
Age: 77
Setting detail: OUTPATIENT SURGERY
Discharge: HOME | End: 2022-12-22
Attending: PHYSICAL MEDICINE & REHABILITATION | Admitting: PHYSICAL MEDICINE & REHABILITATION
Payer: MEDICARE

## 2022-12-22 ENCOUNTER — APPOINTMENT (OUTPATIENT)
Dept: GENERAL RADIOLOGY | Age: 77
End: 2022-12-22
Attending: PHYSICAL MEDICINE & REHABILITATION
Payer: MEDICARE

## 2022-12-22 VITALS
TEMPERATURE: 98.3 F | SYSTOLIC BLOOD PRESSURE: 141 MMHG | RESPIRATION RATE: 16 BRPM | OXYGEN SATURATION: 95 % | HEART RATE: 69 BPM | DIASTOLIC BLOOD PRESSURE: 77 MMHG

## 2022-12-22 PROCEDURE — 76010000009 HC PAIN MGT 0 TO 30 MIN PROC: Performed by: PHYSICAL MEDICINE & REHABILITATION

## 2022-12-22 PROCEDURE — 2709999900 HC NON-CHARGEABLE SUPPLY: Performed by: PHYSICAL MEDICINE & REHABILITATION

## 2022-12-22 PROCEDURE — 74011250636 HC RX REV CODE- 250/636: Performed by: PHYSICAL MEDICINE & REHABILITATION

## 2022-12-22 PROCEDURE — 77030014124 HC TY EPDRL BBMI -A: Performed by: PHYSICAL MEDICINE & REHABILITATION

## 2022-12-22 PROCEDURE — 74011000250 HC RX REV CODE- 250: Performed by: PHYSICAL MEDICINE & REHABILITATION

## 2022-12-22 PROCEDURE — 62323 NJX INTERLAMINAR LMBR/SAC: CPT | Performed by: PHYSICAL MEDICINE & REHABILITATION

## 2022-12-22 RX ORDER — DEXAMETHASONE SODIUM PHOSPHATE 100 MG/10ML
INJECTION INTRAMUSCULAR; INTRAVENOUS AS NEEDED
Status: DISCONTINUED | OUTPATIENT
Start: 2022-12-22 | End: 2022-12-22 | Stop reason: HOSPADM

## 2022-12-22 RX ORDER — LIDOCAINE HYDROCHLORIDE 10 MG/ML
INJECTION, SOLUTION EPIDURAL; INFILTRATION; INTRACAUDAL; PERINEURAL AS NEEDED
Status: DISCONTINUED | OUTPATIENT
Start: 2022-12-22 | End: 2022-12-22 | Stop reason: HOSPADM

## 2022-12-22 RX ORDER — DIAZEPAM 5 MG/1
5-20 TABLET ORAL ONCE
Status: DISCONTINUED | OUTPATIENT
Start: 2022-12-22 | End: 2022-12-22 | Stop reason: HOSPADM

## 2022-12-22 NOTE — PROCEDURES
Intralaminar Epidural Steroid Block Procedure Note      Patient Name: Wendy Reed    Date of Procedure: December 22, 2022    Preoperative Diagnosis: DDD (degenerative disc disease), lumbar [M51.36]    Post Operative Diagnosis: DDD (degenerative disc disease), lumbar [M51.36]    Procedure: L3-L4 Epidural Block     PROCEDURE:  Lumbar Epidural Block    Consent:  Informed  Consent was obtained prior to the procedure. The patient was given the opportunity to ask questions regarding the procedure and its associated risks. In addition to the potential risks associated with the procedure itself, the patient was informed both verbally and in writing of potential side effects of the use glucocorticoids. The patient appeared to comprehend the informed consent and desired to have the procedure performed. The patient was placed in the prone position on the fluoroscopy table and the back prepped and draped in the usual sterile manner. The interlaminar space was identified using fluoroscopy and the skin anesthetized with 1% Lidocaine. A #18 Tuohy Epidural needle was advanced under fluoroscopic control from a paramedian approach to the  epidural space as noted above, using the loss of resistance to fluid technique. Then, 10 mg of preservative free Dexamethasone and 2 cc of Lidocaine was slowly injected. The patient tolerated the procedure well. The injection area was cleaned and band aids applied. No excessive bleeding was noted. Patient dressed and was discharged to home with instructions.

## 2023-01-19 ENCOUNTER — OFFICE VISIT (OUTPATIENT)
Dept: ORTHOPEDIC SURGERY | Age: 78
End: 2023-01-19
Payer: MEDICARE

## 2023-01-19 VITALS
HEART RATE: 67 BPM | HEIGHT: 73 IN | RESPIRATION RATE: 16 BRPM | BODY MASS INDEX: 41.48 KG/M2 | TEMPERATURE: 96.2 F | SYSTOLIC BLOOD PRESSURE: 117 MMHG | OXYGEN SATURATION: 92 % | WEIGHT: 313 LBS | DIASTOLIC BLOOD PRESSURE: 74 MMHG

## 2023-01-19 DIAGNOSIS — M51.26 HNP (HERNIATED NUCLEUS PULPOSUS), LUMBAR: ICD-10-CM

## 2023-01-19 DIAGNOSIS — M48.061 SPINAL STENOSIS OF LUMBAR REGION, UNSPECIFIED WHETHER NEUROGENIC CLAUDICATION PRESENT: ICD-10-CM

## 2023-01-19 DIAGNOSIS — M54.50 LUMBAR PAIN: ICD-10-CM

## 2023-01-19 DIAGNOSIS — M51.36 DDD (DEGENERATIVE DISC DISEASE), LUMBAR: Primary | ICD-10-CM

## 2023-01-19 NOTE — PROGRESS NOTES
Chief Complaint   Patient presents with    Follow-up       Pt preferred language for health care discussion is english. Is someone accompanying this pt? no    Is the patient using any DME equipment during OV? no    Depression Screening:  3 most recent [facility] Screens 2/4/2022 5/13/2021 1/15/2021 10/2/2020 8/7/2020 11/18/2019   PHQ Not Done Patient refuses - - - - -   Little interest or pleasure in doing things - Not at all Not at all Not at all Not at all Not at all   Feeling down, depressed, irritable, or hopeless - Not at all Not at all Not at all Not at all Not at all   Total Score PHQ 2 - 0 0 0 0 0       Learning Assessment:  Learning Assessment 2/24/2022   PRIMARY LEARNER Patient   PRIMARY LANGUAGE ENGLISH   LEARNER PREFERENCE PRIMARY DEMONSTRATION   ANSWERED BY patient   RELATIONSHIP SELF       Abuse Screening:  Abuse Screening Questionnaire 2/24/2022   Do you ever feel afraid of your partner? N   Are you in a relationship with someone who physically or mentally threatens you? N   Is it safe for you to go home? Y       Fall Risk  Fall Risk Assessment, last 12 mths 8/12/2021   Able to walk? Yes   Fall in past 12 months? 0   Do you feel unsteady? 0   Are you worried about falling 0   Is TUG test greater than 12 seconds? -   Is the gait abnormal? -           Advance Directive:  1. Do you have an advance directive in place? Patient Reply:no    2. If not, would you like material regarding how to put one in place? Patient Reply: no    2. Per patient no changes to their ACP contact no. Coordination of Care:  1. Have you been to the ER, urgent care clinic since your last visit? Hospitalized since your last visit? no    2. Have you seen or consulted any other health care providers outside of the [address] [place] [address] since your last visit? Include any pap smears or colon screening.  no

## 2023-01-19 NOTE — PROGRESS NOTES
[facility]  [address]  [place] [address]  Phone: [phone]  Fax: [phone]        PROGRESS NOTE      HISTORY OF PRESENT ILLNESS:  The patient is a 78 y.o. male and was seen today for follow up of no pain. Patient was previously seen for low back pain without radiculopathy. Patient previously had no pain. Patient was previously seen for increase in right sided low back pain into the right buttocks radiating into the RLE in a S1 to the knee after prolonged walking while shopping. Previously seen for lateral right hip pain. Previously seen for low back pain that radiates into the RLE in a S1 distribution to the knee after injection of right hip x 3/2021. His pain is aggravated through activity and by lying down at night. Previously seen for low back pain, previously radiating into the right groin and RLE to the knee. Previously, he was seen for paraesthesias in between the shoulder blades radiating around into the chest. Previously, he was seen for complaints of low back pain. Patient has Positive Shopping cart sign. Previously, he had c/o low back pain into the BLE radiating circumferentially to the calves with symptoms consistent for stenosis. His pain is exacerbated by standing and walking, relieved with sitting. His groin pain is exacerbated by hip flexion and extention and with activities such as putting on shoes and socks. He reports a loss in ROM of his right hip. He reports lumbar epidural to the L3-L4 interlaminar space on 8/25/15 provided significant relief. Pt underwent a right hip intraarticular injection with significant benefit. Pt underwent epidural L3-4 on 9/1/2020 with significant relief. Pt underwent an epidural L3-4 on 7/27/2021 with good relief. Pt reports he received a right hip injection in 9/2021 with good relief. He states he has not had any flare ups of severe pain.  Pt reports he was able to reduce his Topamax dose to 50 mg qhs since 1/2017 without an increase in pain. He states low back and BLE pain has essentially resolved with rare increase in pain with activity. Pt previously underwent a left total hip replacement by Dr. Mariajose Craven. Patient had a right hip replacement in 5/24/2022 by Dr. Mariajose Craven Patient denies DM. Note from Dr. Mariajose Craven dated 8/7/2020 indicating patient was seen with c/o right sided hip and groin pain. Pt has difficulty putting shoes and socks on, and pain at night. Pt has noticed more radicular pain into his RLE. 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 Indicated he may need a RT hip replacement but thought he should try a hip injection first. Ordered a L spine MRI. He notes improvement in standing and walking intolerance. Pt underwent an epidural at L3-4 on 1/18/2022 with benefit, 100% improvement of low back pain and RLE pain. Pt reports he received a right hip injection through Dr. Mariajose Craven on 2/15/2022 w/ relief. Preliminary reading of Thoracic plain films: Limited study due to body habitus. Degenerative changes noted throughout the thoracic spine. No acute pathology identified. Preliminary reading of Pelvis plain films dated 7/2/2020 revealed: Moderately severe joint space narrowing throughout the right hip. L spine MRI dated 8/19/2020 films independently reviewed. Per report, diffuse disc bulge with superimposed left paracentral inferiorly directed extrusion at L2-L3, contributing to moderate spinal canal narrowing at the L2-L3 disc level and just below. Additional underlying degenerative disc disease and facet arthropathy also produce the following: Moderate to severe spinal canal stenosis at L4-L5. Moderate spinal canal stenosis at L3-L4 and L5-S1. 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 At his last clinic appointment, I offered neuropathic medication, pt declined. I ordered a epidural L3-4.      The patient returns today with no pain. He rates his pain 0/10, previously 6/10. Patient says that the worst their pain has been in the past week was a 0/10. Pt underwent L3-4 epidural on 12/22/2022 with relief, able to perform his normal activities and his low back is not bothering him. Patient says his standing and walking tolerance is also much better, and he is able to sleep through the night. Pt denies change in bowel or bladder habits.  reviewed. Body mass index is 41.3 kg/m². PCP: Hay Carig MD      Past Medical History:   Diagnosis Date    Arrhythmia 2006    AFib.  before diagnosis of Graves Disease    Chronic right hip pain     Hypertension     Sleep apnea     uses CPAP    Spinal stenosis     Thyroid disease 06/09/2006    Killed Thyroid with Iodine 131 had Graves Disease        Social History     Socioeconomic History    Marital status:      Spouse name: Not on file    Number of children: Not on file    Years of education: Not on file    Highest education level: Not on file   Occupational History    Not on file   Tobacco Use    Smoking status: Former    Smokeless tobacco: Never    Tobacco comments:     occas Cigar    Vaping Use    Vaping Use: Never used   Substance and Sexual Activity    Alcohol use: Yes     Comment: occas    Drug use: Never    Sexual activity: Not on file   Other Topics Concern    Not on file   Social History Narrative    Not on file     Social Determinants of Health     Financial Resource Strain: Not on file   Food Insecurity: Not on file   Transportation Needs: Not on file   Physical Activity: Not on file   Stress: Not on file   Social Connections: Not on file   Intimate Partner Violence: Not on file   Housing Stability: Not on file       Current Outpatient Medications   Medication Sig Dispense Refill    amoxicillin (AMOXIL) 500 mg capsule Take 4 PO 1 hour prior to dental appointment 4 Capsule 3    metoprolol succinate (TOPROL-XL) 100 mg tablet Take 100 mg by mouth nightly. levothyroxine (SYNTHROID) 150 mcg tablet Take 150 mcg by mouth nightly. Takes  Synthroid at 2100      ROSUVASTATIN CALCIUM (CRESTOR PO) Take 10 mg by mouth daily (after lunch). oxyCODONE-acetaminophen (PERCOCET 10)  mg per tablet Take 1 Tablet by mouth every six (6) hours as needed for Pain. take for up to 7 days. Max daily amount 8 tabs    (Patient not taking: Reported on 12/8/2022)         Allergies   Allergen Reactions    Atorvastatin Myalgia    Statins-Hmg-Coa Reductase Inhibitors Myalgia    Stings [Sting, Bee] Swelling          PHYSICAL EXAMINATION    Visit Vitals  /74 (BP 1 Location: Left upper arm, BP Patient Position: Sitting, BP Cuff Size: Adult long)   Pulse 67   Temp (!) 96.2 °F (35.7 °C) (Temporal)   Resp 16   Ht 6' 1\" (1.854 m)   Wt 313 lb (142 kg)   SpO2 92%   BMI 41.30 kg/m²       CONSTITUTIONAL: NAD, A&O x 3  SENSATION: Intact to light touch throughout  MOTOR:  Straight Leg Raise: Negative, bilateral    Ambulates with a single point cane     Hip Flex Knee Ext Knee Flex Ankle DF GTE Ankle PF Tone   Right +4/5 +4/5 +4/5 +4/5 +4/5 +4/5 +4/5   Left +4/5 +4/5 +4/5 +4/5 +4/5 +4/5 +4/5       ASSESSMENT   Diagnoses and all orders for this visit:    1. DDD (degenerative disc disease), lumbar    2. HNP (herniated nucleus pulposus), lumbar    3. Lumbar pain    4. Spinal stenosis of lumbar region, unspecified whether neurogenic claudication present        IMPRESSION AND PLAN:  Patient returns to the office today with c/o no pain. Multiple treatment options were discussed. Patient says his pain is tolerable at this time. Patient has had 1 injection in the past 12 months. I told him to call the office when he wants the next injection. Patient is neurologically intact. I will see the patient back as needed. Written by John Jamison, as dictated by Manuela Muñoz MD  I examined the patient, reviewed and agree with the note.

## 2023-01-19 NOTE — LETTER
1/19/2023    Patient: Diego Bermudez   YOB: 1945   Date of Visit: 1/19/2023     Hermon Gaucher, MD  [address]  [address] [facility] [address]  Via Fax: [phone]    Dear Hermon Gaucher, MD,      Thank you for referring Mr. Langley Saint to [address] [place] [address] for evaluation. My notes for this consultation are attached. If you have questions, please do not hesitate to call me. I look forward to following your patient along with you.       Sincerely,    Alethea Miller MD

## 2023-05-18 ENCOUNTER — OFFICE VISIT (OUTPATIENT)
Age: 78
End: 2023-05-18
Payer: MEDICARE

## 2023-05-18 DIAGNOSIS — Z96.641 PRESENCE OF RIGHT ARTIFICIAL HIP JOINT: Primary | ICD-10-CM

## 2023-05-18 DIAGNOSIS — M25.551 PAIN IN RIGHT HIP: ICD-10-CM

## 2023-05-18 PROCEDURE — G8427 DOCREV CUR MEDS BY ELIG CLIN: HCPCS | Performed by: PHYSICIAN ASSISTANT

## 2023-05-18 PROCEDURE — 99214 OFFICE O/P EST MOD 30 MIN: CPT | Performed by: PHYSICIAN ASSISTANT

## 2023-05-18 PROCEDURE — G8417 CALC BMI ABV UP PARAM F/U: HCPCS | Performed by: PHYSICIAN ASSISTANT

## 2023-05-18 PROCEDURE — 3017F COLORECTAL CA SCREEN DOC REV: CPT | Performed by: PHYSICIAN ASSISTANT

## 2023-05-18 PROCEDURE — 1036F TOBACCO NON-USER: CPT | Performed by: PHYSICIAN ASSISTANT

## 2023-05-18 PROCEDURE — 1123F ACP DISCUSS/DSCN MKR DOCD: CPT | Performed by: PHYSICIAN ASSISTANT

## 2023-05-18 PROCEDURE — 73502 X-RAY EXAM HIP UNI 2-3 VIEWS: CPT | Performed by: PHYSICIAN ASSISTANT

## 2023-05-18 NOTE — PROGRESS NOTES
Patient: Tucker Bonilla                MRN: [id number]       SSN: [id number]  YOB: 1945        AGE: 78 y.o. SEX: male  There is no height or weight on file to calculate BMI. PCP: Mason Deshpande MD  05/18/23      This office note has been dictated. REVIEW OF SYSTEMS:  Constitutional: Negative for fever, chills, weight loss and malaise/fatigue. HENT: Negative. Eyes: Negative. Respiratory: Negative. Cardiovascular: Negative. Gastrointestinal: No bowel incontinence or constipation. Genitourinary: No bladder incontinence or saddle anesthesia. Skin: Negative. Neurological: Negative. Endo/Heme/Allergies: Negative. Psychiatric/Behavioral: Negative. Musculoskeletal: As per HPI above. Past Medical History:   Diagnosis Date    Arrhythmia 2006    AFib. before diagnosis of Graves Disease    Chronic right hip pain     Hypertension     Sleep apnea     uses CPAP    Spinal stenosis     Thyroid disease 06/09/2006    Killed Thyroid with Iodine 131 had Graves Disease         Current Outpatient Medications:     amoxicillin (AMOXIL) 500 MG capsule, Take 4 PO 1 hour prior to dental appointment, Disp: , Rfl:     levothyroxine (SYNTHROID) 150 MCG tablet, Take 150 mcg by mouth, Disp: , Rfl:     metoprolol succinate (TOPROL XL) 100 MG extended release tablet, Take 100 mg by mouth, Disp: , Rfl:     oxyCODONE-acetaminophen (PERCOCET)  MG per tablet, Take 1 tablet by mouth every 6 hours as needed. , Disp: , Rfl:     Allergies   Allergen Reactions    Bee Venom Swelling    Statins Myalgia       Social History     Socioeconomic History    Marital status:      Spouse name: Not on file    Number of children: Not on file    Years of education: Not on file    Highest education level: Not on file   Occupational History    Not on file   Tobacco Use    Smoking status: Former    Smokeless tobacco: Never   Substance and Sexual Activity    Alcohol use: Yes    Drug use: Never

## 2023-06-01 ENCOUNTER — HOSPITAL ENCOUNTER (OUTPATIENT)
Facility: HOSPITAL | Age: 78
Discharge: HOME | End: 2023-06-01
Payer: MEDICARE

## 2023-06-01 DIAGNOSIS — Z96.641 PRESENCE OF RIGHT ARTIFICIAL HIP JOINT: ICD-10-CM

## 2023-06-01 DIAGNOSIS — M25.551 PAIN IN RIGHT HIP: ICD-10-CM

## 2023-06-01 PROCEDURE — 73721 MRI JNT OF LWR EXTRE W/O DYE: CPT

## 2023-06-26 ENCOUNTER — OFFICE VISIT (OUTPATIENT)
Age: 78
End: 2023-06-26
Payer: MEDICARE

## 2023-06-26 VITALS — WEIGHT: 314 LBS | HEIGHT: 73 IN | BODY MASS INDEX: 41.62 KG/M2

## 2023-06-26 DIAGNOSIS — M25.551 PAIN IN RIGHT HIP: ICD-10-CM

## 2023-06-26 DIAGNOSIS — Z96.641 PRESENCE OF RIGHT ARTIFICIAL HIP JOINT: Primary | ICD-10-CM

## 2023-06-26 DIAGNOSIS — M84.30XA STRESS REACTION OF BONE: ICD-10-CM

## 2023-06-26 PROCEDURE — 1123F ACP DISCUSS/DSCN MKR DOCD: CPT | Performed by: PHYSICIAN ASSISTANT

## 2023-06-26 PROCEDURE — G8417 CALC BMI ABV UP PARAM F/U: HCPCS | Performed by: PHYSICIAN ASSISTANT

## 2023-06-26 PROCEDURE — G8427 DOCREV CUR MEDS BY ELIG CLIN: HCPCS | Performed by: PHYSICIAN ASSISTANT

## 2023-06-26 PROCEDURE — 1036F TOBACCO NON-USER: CPT | Performed by: PHYSICIAN ASSISTANT

## 2023-06-26 PROCEDURE — 3017F COLORECTAL CA SCREEN DOC REV: CPT | Performed by: PHYSICIAN ASSISTANT

## 2023-06-26 PROCEDURE — 99213 OFFICE O/P EST LOW 20 MIN: CPT | Performed by: PHYSICIAN ASSISTANT

## 2023-06-26 RX ORDER — AMOXICILLIN 500 MG/1
CAPSULE ORAL
Qty: 4 CAPSULE | Refills: 3 | Status: SHIPPED | OUTPATIENT
Start: 2023-06-26

## 2023-07-27 ENCOUNTER — OFFICE VISIT (OUTPATIENT)
Age: 78
End: 2023-07-27
Payer: MEDICARE

## 2023-07-27 VITALS — BODY MASS INDEX: 41.75 KG/M2 | WEIGHT: 315 LBS | HEIGHT: 73 IN

## 2023-07-27 DIAGNOSIS — Z96.641 PRESENCE OF RIGHT ARTIFICIAL HIP JOINT: Primary | ICD-10-CM

## 2023-07-27 DIAGNOSIS — M84.30XA STRESS REACTION OF BONE: ICD-10-CM

## 2023-07-27 DIAGNOSIS — M70.61 TROCHANTERIC BURSITIS OF RIGHT HIP: ICD-10-CM

## 2023-07-27 DIAGNOSIS — M25.551 PAIN IN RIGHT HIP: ICD-10-CM

## 2023-07-27 PROCEDURE — 1123F ACP DISCUSS/DSCN MKR DOCD: CPT | Performed by: PHYSICIAN ASSISTANT

## 2023-07-27 PROCEDURE — G8428 CUR MEDS NOT DOCUMENT: HCPCS | Performed by: PHYSICIAN ASSISTANT

## 2023-07-27 PROCEDURE — 99214 OFFICE O/P EST MOD 30 MIN: CPT | Performed by: PHYSICIAN ASSISTANT

## 2023-07-27 PROCEDURE — 3017F COLORECTAL CA SCREEN DOC REV: CPT | Performed by: PHYSICIAN ASSISTANT

## 2023-07-27 PROCEDURE — 1036F TOBACCO NON-USER: CPT | Performed by: PHYSICIAN ASSISTANT

## 2023-07-27 PROCEDURE — 72170 X-RAY EXAM OF PELVIS: CPT | Performed by: PHYSICIAN ASSISTANT

## 2023-07-27 PROCEDURE — G8417 CALC BMI ABV UP PARAM F/U: HCPCS | Performed by: PHYSICIAN ASSISTANT

## 2023-08-01 ENCOUNTER — HOSPITAL ENCOUNTER (OUTPATIENT)
Facility: HOSPITAL | Age: 78
Discharge: HOME | End: 2023-08-04
Payer: MEDICARE

## 2023-08-01 DIAGNOSIS — M25.551 PAIN IN RIGHT HIP: ICD-10-CM

## 2023-08-01 DIAGNOSIS — M84.30XA STRESS REACTION OF BONE: ICD-10-CM

## 2023-08-01 PROCEDURE — 73700 CT LOWER EXTREMITY W/O DYE: CPT

## 2023-08-31 ENCOUNTER — OFFICE VISIT (OUTPATIENT)
Age: 78
End: 2023-08-31

## 2023-08-31 DIAGNOSIS — M25.551 PAIN IN RIGHT HIP: ICD-10-CM

## 2023-08-31 DIAGNOSIS — M70.61 TROCHANTERIC BURSITIS OF RIGHT HIP: ICD-10-CM

## 2023-08-31 DIAGNOSIS — M84.30XA STRESS REACTION OF BONE: ICD-10-CM

## 2023-08-31 DIAGNOSIS — Z96.641 PRESENCE OF RIGHT ARTIFICIAL HIP JOINT: Primary | ICD-10-CM

## 2023-08-31 NOTE — PROGRESS NOTES
previous MRI with  subtle adjacent periosteal reaction along the posterior femoral cortex. Findings  favor stress reaction. Given degree of focal cortical involvement at the tip  impending fracture not excluded. There is also a small focal lucency adjacent to  the femoral stem tip as discussed. Overall findings may be related to angulation  of the femoral stem towards the posterior medullary canal and developing  loosening. 2. Please see report for additional findings and full details    Radiographs obtained in office today 8/31/2023 at the high [place] location including AP pelvis, AP and crosstable lateral of the right hip shows no evidence for loosening. There is no evidence for callus formation noted. The total hip components appear to be well fixed. Assessment: #1 status post right total replacement, #2 right thigh pain improved    Plan: At this point, symptomatically the patient is doing well. He is having no thigh pain. I would like to get Dr. Adams Vega opinion to see if a prophylactic plating is warranted in this situation. I have asked him to continue on his walker for now with protected weightbearing. We will have a referral to Dr. Trip Karimi for bone strengthening. Radiologic findings were reviewed and discussed with the patient. All questions were answered.         JR Lincoln MACEDO, PAHilaryC, ATC

## 2023-09-09 SDOH — HEALTH STABILITY: PHYSICAL HEALTH
ON AVERAGE, HOW MANY DAYS PER WEEK DO YOU ENGAGE IN MODERATE TO STRENUOUS EXERCISE (LIKE A BRISK WALK)?: PATIENT DECLINED

## 2023-09-09 SDOH — HEALTH STABILITY: PHYSICAL HEALTH: ON AVERAGE, HOW MANY MINUTES DO YOU ENGAGE IN EXERCISE AT THIS LEVEL?: 10 MIN

## 2023-09-09 ASSESSMENT — SOCIAL DETERMINANTS OF HEALTH (SDOH)
WITHIN THE LAST YEAR, HAVE YOU BEEN HUMILIATED OR EMOTIONALLY ABUSED IN OTHER WAYS BY YOUR PARTNER OR EX-PARTNER?: YES
WITHIN THE LAST YEAR, HAVE YOU BEEN KICKED, HIT, SLAPPED, OR OTHERWISE PHYSICALLY HURT BY YOUR PARTNER OR EX-PARTNER?: YES
WITHIN THE LAST YEAR, HAVE YOU BEEN AFRAID OF YOUR PARTNER OR EX-PARTNER?: YES
WITHIN THE LAST YEAR, HAVE TO BEEN RAPED OR FORCED TO HAVE ANY KIND OF SEXUAL ACTIVITY BY YOUR PARTNER OR EX-PARTNER?: NO

## 2023-09-12 ENCOUNTER — OFFICE VISIT (OUTPATIENT)
Age: 78
End: 2023-09-12

## 2023-09-12 VITALS — WEIGHT: 315 LBS | HEIGHT: 73 IN | BODY MASS INDEX: 41.75 KG/M2

## 2023-09-12 DIAGNOSIS — E55.9 VITAMIN D DEFICIENCY: ICD-10-CM

## 2023-09-12 DIAGNOSIS — Z96.641 PRESENCE OF RIGHT ARTIFICIAL HIP JOINT: Primary | ICD-10-CM

## 2023-09-25 ENCOUNTER — HOSPITAL ENCOUNTER (OUTPATIENT)
Facility: HOSPITAL | Age: 78
Setting detail: RECURRING SERIES
Discharge: HOME | End: 2023-09-28
Payer: MEDICARE

## 2023-09-25 PROCEDURE — 97110 THERAPEUTIC EXERCISES: CPT

## 2023-09-25 PROCEDURE — 97112 NEUROMUSCULAR REEDUCATION: CPT

## 2023-09-25 PROCEDURE — 97161 PT EVAL LOW COMPLEX 20 MIN: CPT

## 2023-09-25 PROCEDURE — 97535 SELF CARE MNGMENT TRAINING: CPT

## 2023-09-25 NOTE — PROGRESS NOTES
potential to make gains with improved ROM, strength, endurance/activity tolerance, functional FOTO survey score   and all within a reasonable time frame so as to increase their functional independence with ADLs and activities for carryover to  improved quality of life, tolerance to household and community activities. Patient requires skilled Physical Therapy so as to monitor their response to and modify their treatment plan accordingly. Patient appears to be an appropriate candidate for skilled outpatient Physical Therapy. Evaluation Complexity:  History:  HIGH Complexity :3+ comorbidities / personal factors will impact the outcome/ POC ; Examination:  HIGH Complexity : 4+ Standardized tests and measures addressing body structure, function, activity limitation and / or participation in recreation  ;Presentation:  LOW Complexity : Stable, uncomplicated  ;Clinical Decision Making:  MEDIUM Complexity : FOTO score of 26-74 FOTO score = an established functional score where 100 = no disability  Overall Complexity Rating: LOW   Problem List: decrease ROM, decrease strength, impaired gait/balance, decrease ADL/functional abilities, decrease activity tolerance, decrease flexibility/joint mobility, decrease transfer abilities , and other FOTO 54    Treatment Plan may include any combination of the followin Therapeutic Exercise, 63920 Neuromuscular Re-Education, 49208 Manual Therapy, 81974 Therapeutic Activity, 28489 Self Care/Home Management, and 45688 Gait Training  Patient / Family readiness to learn indicated by: asking questions, trying to perform skills, interest, return verbalization , and return demonstration   Persons(s) to be included in education: patient (P)  Barriers to Learning/Limitations: none  Measures taken if barriers to learning present: NA  Patient Goal (s): walk properly without the cane  Patient Self Reported Health Status: good  Rehabilitation Potential: good    Short Term Goals:  To be
requires skilled Physical Therapy so as to monitor their response to and modify their treatment plan accordingly. Patient appears to be an appropriate candidate for skilled outpatient Physical Therapy. Patient will continue to benefit from skilled PT services to modify and progress therapeutic interventions, analyze and address functional mobility deficits, analyze and address ROM deficits, analyze and address strength deficits, analyze and address soft tissue restrictions, analyze and cue for proper movement patterns, analyze and modify for postural abnormalities, and instruct in home and community integration to address functional deficits and attain remaining goals.        [x]  See Plan of Care for goals and assessment      PLAN  [x]  Upgrade activities as tolerated     [x]  Continue plan of care  []  Update interventions per flow sheet       []  Other:_      Anselmo Walker, PT 9/25/2023  12:59 PM

## 2023-09-27 ENCOUNTER — HOSPITAL ENCOUNTER (OUTPATIENT)
Facility: HOSPITAL | Age: 78
Setting detail: RECURRING SERIES
Discharge: HOME | End: 2023-09-30
Payer: MEDICARE

## 2023-09-27 PROCEDURE — 97110 THERAPEUTIC EXERCISES: CPT

## 2023-09-27 PROCEDURE — 97112 NEUROMUSCULAR REEDUCATION: CPT

## 2023-09-27 PROCEDURE — 97530 THERAPEUTIC ACTIVITIES: CPT

## 2023-09-27 NOTE — PROGRESS NOTES
accomplished in 8 weeks  1 patient will have established and be I with HEP to aid with independence and self management at discharge  EVAL HEP issued at evaluation  2 patient will have FOTO 61 to aid with increase tolerance to ADLS and regular daily activities at home and in the community  EVAL 54  3  patient will have SL Abduction right LE 4,4+ to aid with increase stability for ambulation with less [place] and increase I to ambulate with no AD for community activities  EVAL 3+  4 patient will ambulated 6 minutes level surface in clinic with no AD use and no LOB for carryover to community activities and activities at home  EVAL requires use of L SC due to right hip weakness       Next PN/ RC due PN: 10/25/23; RC: 11/25/23  Auth due [address] Vita Parada, PT    9/27/2023    8:18 AM    Future Appointments   Date Time Provider [address]   10/3/2023  7:30 AM Ariane Da Silva, PTA [facility]   10/5/2023  8:50 AM Aura Marilyn, PT [facility]   10/12/2023  8:10 AM Rene Westony, PTA [facility]   10/17/2023  8:50 AM Aura Marilyn, PT [facility]   10/19/2023  8:50 AM Ilshabana Westony, PTA [facility]   10/24/2023  8:50 AM Aura Marilyn, PT [facility]   10/26/2023  8:50 AM Michela Montes, PT [facility]   12/12/2023  1:30 PM Millie Chandler DO [facility] AMB

## 2023-10-03 ENCOUNTER — HOSPITAL ENCOUNTER (OUTPATIENT)
Facility: HOSPITAL | Age: 78
Setting detail: RECURRING SERIES
Discharge: HOME | End: 2023-10-06
Payer: MEDICARE

## 2023-10-03 ENCOUNTER — OFFICE VISIT (OUTPATIENT)
Age: 78
End: 2023-10-03
Payer: MEDICARE

## 2023-10-03 VITALS
SYSTOLIC BLOOD PRESSURE: 129 MMHG | WEIGHT: 315 LBS | TEMPERATURE: 98.1 F | DIASTOLIC BLOOD PRESSURE: 62 MMHG | BODY MASS INDEX: 41.75 KG/M2 | OXYGEN SATURATION: 94 % | HEART RATE: 66 BPM | HEIGHT: 73 IN

## 2023-10-03 DIAGNOSIS — M48.062 SPINAL STENOSIS OF LUMBAR REGION WITH NEUROGENIC CLAUDICATION: ICD-10-CM

## 2023-10-03 DIAGNOSIS — M54.16 LUMBAR NEURITIS: ICD-10-CM

## 2023-10-03 DIAGNOSIS — M43.16 SPONDYLOLISTHESIS, LUMBAR REGION: ICD-10-CM

## 2023-10-03 DIAGNOSIS — M54.50 LUMBAR PAIN: Primary | ICD-10-CM

## 2023-10-03 DIAGNOSIS — M47.817 LUMBOSACRAL SPONDYLOSIS WITHOUT MYELOPATHY: ICD-10-CM

## 2023-10-03 DIAGNOSIS — M51.36 DDD (DEGENERATIVE DISC DISEASE), LUMBAR: ICD-10-CM

## 2023-10-03 PROCEDURE — G8417 CALC BMI ABV UP PARAM F/U: HCPCS | Performed by: PHYSICAL MEDICINE & REHABILITATION

## 2023-10-03 PROCEDURE — G8427 DOCREV CUR MEDS BY ELIG CLIN: HCPCS | Performed by: PHYSICAL MEDICINE & REHABILITATION

## 2023-10-03 PROCEDURE — 1036F TOBACCO NON-USER: CPT | Performed by: PHYSICAL MEDICINE & REHABILITATION

## 2023-10-03 PROCEDURE — 1123F ACP DISCUSS/DSCN MKR DOCD: CPT | Performed by: PHYSICAL MEDICINE & REHABILITATION

## 2023-10-03 PROCEDURE — 72100 X-RAY EXAM L-S SPINE 2/3 VWS: CPT | Performed by: PHYSICAL MEDICINE & REHABILITATION

## 2023-10-03 PROCEDURE — 97530 THERAPEUTIC ACTIVITIES: CPT

## 2023-10-03 PROCEDURE — 97110 THERAPEUTIC EXERCISES: CPT

## 2023-10-03 PROCEDURE — 99214 OFFICE O/P EST MOD 30 MIN: CPT | Performed by: PHYSICAL MEDICINE & REHABILITATION

## 2023-10-03 PROCEDURE — G8484 FLU IMMUNIZE NO ADMIN: HCPCS | Performed by: PHYSICAL MEDICINE & REHABILITATION

## 2023-10-03 PROCEDURE — 97112 NEUROMUSCULAR REEDUCATION: CPT

## 2023-10-03 RX ORDER — METHYLPREDNISOLONE 4 MG/1
TABLET ORAL
Qty: 1 KIT | Refills: 0 | Status: SHIPPED | OUTPATIENT
Start: 2023-10-03

## 2023-10-03 RX ORDER — IBUPROFEN 800 MG/1
800 TABLET ORAL 3 TIMES DAILY PRN
Qty: 45 TABLET | Refills: 0 | Status: SHIPPED | OUTPATIENT
Start: 2023-10-03

## 2023-10-03 RX ORDER — VITAMIN K2 90 MCG
1000 CAPSULE ORAL DAILY
COMMUNITY

## 2023-10-03 RX ORDER — ROSUVASTATIN CALCIUM 10 MG/1
TABLET, COATED ORAL
COMMUNITY
Start: 2022-08-16

## 2023-10-03 NOTE — PROGRESS NOTES
[facility]  [address]  [address]  [place], [place]  Tel: [phone]  Fax: [phone]          PROGRESS NOTE      HISTORY OF PRESENT ILLNESS:  The patient is a 70 y.o. male and was seen today for follow up of low back pain radiating to bilateral groin. Patient was previously seen for low back pain without radiculopathy. Patient previously had no pain. Patient was previously  seen for increase in right sided low back pain into the right buttocks radiating into the RLE in a S1 to the knee after prolonged walking while shopping. Previously seen for lateral right hip  pain. Previously seen for low back pain that radiates into the RLE in a S1 distribution to the knee after injection of right hip x 3/2021. His  pain is aggravated through activity and by lying down at night. Previously seen for low back pain, previously radiating into the right groin and RLE to the knee. Previously, he was seen for paraesthesias in between the shoulder blades radiating around  into the chest. Previously, he was seen for complaints of low back pain. Patient has Positive Shopping cart sign . Previously, he had c/o low back pain into the BLE radiating circumferentially to the calves with symptoms consistent for stenosis. His pain  is exacerbated by standing and walking, relieved with sitting. His groin pain is exacerbated by hip flexion and extention and with activities such as putting on shoes and socks. He reports a loss in ROM of his right hip. He reports lumbar epidural to the L3-L4 interlaminar space on 8/25/15 provided significant relief. Pt underwent a right hip intraarticular injection with  significant benefit. Pt underwent epidural L3-4 on 9/1/2020 with significant relief. Pt underwent an epidural L3-4 on 7/27/2021 with good relief. Pt reports he received a right hip injection in  9/2021 with good relief.  Pt  underwent an epidural at L3-4 on 1/18/2022 with benefit, 100%

## 2023-10-03 NOTE — PROGRESS NOTES
PHYSICAL / OCCUPATIONAL THERAPY - DAILY TREATMENT NOTE (updated )    Patient Name: Billie Arvizu    Date: 10/3/2023    : 1945  Insurance: Payor: MEDICARE / Plan: MEDICARE PART A AND B / Product Type: *No Product type* /      Patient  verified Yes     Visit #   Current / Total 3 16   Time   In / Out 7:41 8:20   Pain   In / Out 0 0   Subjective Functional Status/Changes: \"My hip is not hurting its my back. \"     TREATMENT AREA =  Pain in right hip [M25.551]    OBJECTIVE         Therapeutic Procedures: Tx Min Billable or 1:1 Min (if diff from Tx Min) Procedure, Rationale, Specifics   21  14363 Therapeutic Exercise (timed):  increase ROM, strength, coordination, balance, and proprioception to improve patient's ability to progress to PLOF and address remaining functional goals. (see flow sheet as applicable)     Details if applicable:       10  59861 Therapeutic Activity (timed):  use of dynamic activities replicating functional movements to increase ROM, strength, coordination, balance, and proprioception in order to improve patient's ability to progress to PLOF and address remaining functional goals. (see flow sheet as applicable)     Details if applicable:     8  10041 Neuromuscular Re-Education (timed):  improve balance, coordination, kinesthetic sense, posture, core stability and proprioception to improve patient's ability to develop conscious control of individual muscles and awareness of position of extremities in order to progress to PLOF and address remaining functional goals.  (see flow sheet as applicable)     Details if applicable:            Details if applicable:            Details if applicable:     [address] [facility] [address] Totals Reminder: bill using total billable min of TIMED therapeutic procedures (example: do not include dry needle or estim unattended, both untimed codes, in totals to left)  8-22 min = 1 unit; 23-37 min = 2 units; 38-52 min = 3 units; 53-67 min = 4 units; 68-82 min = 5 units

## 2023-10-05 ENCOUNTER — HOSPITAL ENCOUNTER (OUTPATIENT)
Facility: HOSPITAL | Age: 78
Setting detail: RECURRING SERIES
Discharge: HOME | End: 2023-10-08
Payer: MEDICARE

## 2023-10-05 PROCEDURE — 97530 THERAPEUTIC ACTIVITIES: CPT

## 2023-10-05 PROCEDURE — 97112 NEUROMUSCULAR REEDUCATION: CPT

## 2023-10-05 PROCEDURE — 97110 THERAPEUTIC EXERCISES: CPT

## 2023-10-06 ENCOUNTER — HOSPITAL ENCOUNTER (OUTPATIENT)
Facility: HOSPITAL | Age: 78
End: 2023-10-06
Attending: PHYSICAL MEDICINE & REHABILITATION
Payer: MEDICARE

## 2023-10-06 DIAGNOSIS — M54.50 LUMBAR PAIN: ICD-10-CM

## 2023-10-06 DIAGNOSIS — M48.062 SPINAL STENOSIS OF LUMBAR REGION WITH NEUROGENIC CLAUDICATION: ICD-10-CM

## 2023-10-06 DIAGNOSIS — M43.16 SPONDYLOLISTHESIS, LUMBAR REGION: ICD-10-CM

## 2023-10-06 DIAGNOSIS — M47.817 LUMBOSACRAL SPONDYLOSIS WITHOUT MYELOPATHY: ICD-10-CM

## 2023-10-06 DIAGNOSIS — M54.16 LUMBAR NEURITIS: ICD-10-CM

## 2023-10-06 DIAGNOSIS — M51.36 DDD (DEGENERATIVE DISC DISEASE), LUMBAR: ICD-10-CM

## 2023-10-06 PROCEDURE — 72148 MRI LUMBAR SPINE W/O DYE: CPT

## 2023-10-12 ENCOUNTER — HOSPITAL ENCOUNTER (OUTPATIENT)
Facility: HOSPITAL | Age: 78
Setting detail: RECURRING SERIES
Discharge: HOME | End: 2023-10-15
Payer: MEDICARE

## 2023-10-12 PROCEDURE — 97530 THERAPEUTIC ACTIVITIES: CPT

## 2023-10-12 PROCEDURE — 97110 THERAPEUTIC EXERCISES: CPT

## 2023-10-12 NOTE — PROGRESS NOTES
PHYSICAL / OCCUPATIONAL THERAPY - DAILY TREATMENT NOTE (updated )    Patient Name: Marquis Aleman    Date: 10/12/2023    : 1945  Insurance: Payor: MEDICARE / Plan: MEDICARE PART A AND B / Product Type: *No Product type* /      Patient  verified Yes     Visit #   Current / Total 5 16   Time   In / Out 810 904   Pain   In / Out 0 0   Subjective Functional Status/Changes: Patient complains of no R hip pain     TREATMENT AREA =  Pain in right hip [M25.551]  Abnormality of gait [R26.9]    OBJECTIVE  Modalities Rationale:     decrease pain to improve patient's ability to progress to PLOF and address remaining functional goals. min [] Estim Unattended, type/location:                                      []  w/ice    []  w/heat    min [] Estim Attended, type/location:                                     []  w/US     []  w/ice    []  w/heat    []  TENS insruct      min []  Mechanical Traction: type/lbs                   []  pro   []  sup   []  int   []  cont    []  before manual    []  after manual    min []  Ultrasound, settings/location:     10 min  unbill []  Ice     [x]  Heat    location/position: Supine/ low back area per patient request     min []  Paraffin,  details:     min []  Vasopneumatic Device, press/temp:     min []  Dillon Crofts / Svetlana Alt: If using vaso (only need to measure limb vaso being performed on)      pre-treatment girth :       post-treatment girth :       measured at (landmark location) :      min []  Other:    Skin assessment post-treatment:   Intact      Therapeutic Procedures: Tx Min Billable or 1:1 Min (if diff from Tx Min) Procedure, Rationale, Specifics   34  88248 Therapeutic Exercise (timed):  increase ROM, strength, coordination, balance, and proprioception to improve patient's ability to progress to PLOF and address remaining functional goals.  (see flow sheet as applicable)     Details if applicable:       10  27145 Therapeutic Activity (timed):  use of dynamic

## 2023-10-17 ENCOUNTER — HOSPITAL ENCOUNTER (OUTPATIENT)
Facility: HOSPITAL | Age: 78
Setting detail: RECURRING SERIES
Discharge: HOME | End: 2023-10-20
Payer: MEDICARE

## 2023-10-17 PROCEDURE — 97530 THERAPEUTIC ACTIVITIES: CPT

## 2023-10-17 PROCEDURE — 97112 NEUROMUSCULAR REEDUCATION: CPT

## 2023-10-17 PROCEDURE — 97110 THERAPEUTIC EXERCISES: CPT

## 2023-10-17 NOTE — PROGRESS NOTES
deficits and attain remaining goals. Progress toward goals / Updated goals:  []  See Progress Note/Recertification        Short Term Goals: To be accomplished in 4 weeks  1 patient will have established and be I with HEP to aid with independence and self management at discharge  EVAL HEP issued at evaluation   Current:  Patient reports that he is not doing his HEP everyday ,10/17/23  2 patient will have FOTO 57 to aid with increase tolerance to ADLS and regular daily activities at home and in the community  EVAL 54  CURRENT NA 10/17/23  3 patient will have SL Abduction right LE 4- to aid with increase stability for ambulation with less [place] and increase I to ambulate with no AD for community activities  EVAL 3+  CURRENT NA 10/17/23     Long Term Goals:  To be accomplished in 8 weeks  1 patient will have established and be I with HEP to aid with independence and self management at discharge  EVAL HEP issued at evaluation  CURRENT reports compliance with HEP 10/17/23  2 patient will have FOTO 61 to aid with increase tolerance to ADLS and regular daily activities at home and in the community  EVAL 54  CURRENT NA 10/17/23  3  patient will have SL Abduction right LE 4,4+ to aid with increase stability for ambulation with less [place] and increase I to ambulate with no AD for community activities  EVAL 3+  CURRENT NA 10/17/23  4 patient will ambulated 6 minutes level surface in clinic with no AD use and no LOB for carryover to community activities and activities at home  EVAL requires use of L SC due to right hip weakness  CURRENT NA 10/17/23        Next PN/ RC due PN: 10/25/23; RC: 11/25/23  Auth due [facility] activities as tolerated    Anselmo Walker, PT    10/17/2023    8:51 AM    Future Appointments   Date Time Provider [address]   10/18/2023  9:00 AM Corazon Deleon MD [facility] AMB   10/19/2023  8:50 AM [facility]   10/24/2023

## 2023-10-17 NOTE — PROGRESS NOTES
SENSATION: Sensation is intact to light touch throughout. Fatmata De La Cruz was seen today for back problem. Diagnoses and all orders for this visit:    Spinal stenosis of lumbar region with neurogenic claudication    Lumbar neuritis    DDD (degenerative disc disease), lumbosacral    Lumbosacral spondylosis without myelopathy    Facet arthropathy, lumbar    Spondylolisthesis, lumbar region    Foraminal stenosis of lumbosacral region          IMPRESSION AND PLAN:  Patient returns to the office today with c/o low back pain radiating to bilateral groin. Multiple treatment options were discussed. Patient elected to proceed with blocks. I will order L3-4 epidural.  Patient is neurologically intact. I will see the patient back after the Epidural or earlier if needed. Written by Mimi Irby, as dictated by Claire Gifford MD  I examined the patient, reviewed and agree with the note.

## 2023-10-18 ENCOUNTER — OFFICE VISIT (OUTPATIENT)
Age: 78
End: 2023-10-18
Payer: MEDICARE

## 2023-10-18 VITALS
OXYGEN SATURATION: 99 % | WEIGHT: 315 LBS | BODY MASS INDEX: 42.09 KG/M2 | HEART RATE: 57 BPM | RESPIRATION RATE: 20 BRPM | TEMPERATURE: 97.3 F

## 2023-10-18 DIAGNOSIS — M48.07 FORAMINAL STENOSIS OF LUMBOSACRAL REGION: ICD-10-CM

## 2023-10-18 DIAGNOSIS — M51.37 DDD (DEGENERATIVE DISC DISEASE), LUMBOSACRAL: ICD-10-CM

## 2023-10-18 DIAGNOSIS — M54.16 LUMBAR NEURITIS: ICD-10-CM

## 2023-10-18 DIAGNOSIS — M47.816 FACET ARTHROPATHY, LUMBAR: ICD-10-CM

## 2023-10-18 DIAGNOSIS — M43.16 SPONDYLOLISTHESIS, LUMBAR REGION: ICD-10-CM

## 2023-10-18 DIAGNOSIS — M47.817 LUMBOSACRAL SPONDYLOSIS WITHOUT MYELOPATHY: ICD-10-CM

## 2023-10-18 DIAGNOSIS — M48.062 SPINAL STENOSIS OF LUMBAR REGION WITH NEUROGENIC CLAUDICATION: Primary | ICD-10-CM

## 2023-10-18 PROCEDURE — 1123F ACP DISCUSS/DSCN MKR DOCD: CPT | Performed by: PHYSICAL MEDICINE & REHABILITATION

## 2023-10-18 PROCEDURE — G8484 FLU IMMUNIZE NO ADMIN: HCPCS | Performed by: PHYSICAL MEDICINE & REHABILITATION

## 2023-10-18 PROCEDURE — G8417 CALC BMI ABV UP PARAM F/U: HCPCS | Performed by: PHYSICAL MEDICINE & REHABILITATION

## 2023-10-18 PROCEDURE — G8427 DOCREV CUR MEDS BY ELIG CLIN: HCPCS | Performed by: PHYSICAL MEDICINE & REHABILITATION

## 2023-10-18 PROCEDURE — 99214 OFFICE O/P EST MOD 30 MIN: CPT | Performed by: PHYSICAL MEDICINE & REHABILITATION

## 2023-10-18 PROCEDURE — 1036F TOBACCO NON-USER: CPT | Performed by: PHYSICAL MEDICINE & REHABILITATION

## 2023-10-19 ENCOUNTER — HOSPITAL ENCOUNTER (OUTPATIENT)
Facility: HOSPITAL | Age: 78
Setting detail: RECURRING SERIES
Discharge: HOME | End: 2023-10-22
Payer: MEDICARE

## 2023-10-19 PROCEDURE — 97530 THERAPEUTIC ACTIVITIES: CPT

## 2023-10-19 PROCEDURE — 97112 NEUROMUSCULAR REEDUCATION: CPT

## 2023-10-19 PROCEDURE — 97110 THERAPEUTIC EXERCISES: CPT

## 2023-10-19 NOTE — PROGRESS NOTES
PHYSICAL / OCCUPATIONAL THERAPY - DAILY TREATMENT NOTE (updated )    Patient Name: Tien Birmingham    Date: 10/19/2023    : 1945  Insurance: Payor: MEDICARE / Plan: MEDICARE PART A AND B / Product Type: *No Product type* /      Patient  verified Yes     Visit #   Current / Total 7 16   Time   In / Out 851 am  945 am    Pain   In / Out 0/10  0/10    Subjective Functional Status/Changes: Patient denies any hip pain today. TREATMENT AREA =  Pain in right hip [M25.551]  Abnormality of gait [R26.9]    OBJECTIVE    Modalities Rationale:     decrease inflammation, decrease pain, and increase tissue extensibility to improve patient's ability to progress to PLOF and address remaining functional goals. min [] Estim Unattended, type/location:                                      []  w/ice    []  w/heat    min [] Estim Attended, type/location:                                     []  w/US     []  w/ice    []  w/heat    []  TENS insruct      min []  Mechanical Traction: type/lbs                   []  pro   []  sup   []  int   []  cont    []  before manual    []  after manual    min []  Ultrasound, settings/location:     10 min  unbill []  Ice     [x]  Heat    location/position: Reclined to L/S    min []  Paraffin,  details:     min []  Vasopneumatic Device, press/temp:     min []  Carvin Loots / De La Rosa Been: If using vaso (only need to measure limb vaso being performed on)      pre-treatment girth :       post-treatment girth :       measured at (landmark location) :      min []  Other:    Skin assessment post-treatment:   Intact      Therapeutic Procedures: Tx Min Billable or 1:1 Min (if diff from Tx Min) Procedure, Rationale, Specifics   14  35582 Therapeutic Exercise (timed):  increase ROM, strength, coordination, balance, and proprioception to improve patient's ability to progress to PLOF and address remaining functional goals.  (see flow sheet as applicable)     Details if applicable:       20  08136

## 2023-10-24 ENCOUNTER — HOSPITAL ENCOUNTER (OUTPATIENT)
Facility: HOSPITAL | Age: 78
Setting detail: RECURRING SERIES
Discharge: HOME | End: 2023-10-27
Payer: MEDICARE

## 2023-10-24 PROCEDURE — 97110 THERAPEUTIC EXERCISES: CPT

## 2023-10-24 PROCEDURE — 97530 THERAPEUTIC ACTIVITIES: CPT

## 2023-10-24 PROCEDURE — 97112 NEUROMUSCULAR REEDUCATION: CPT

## 2023-10-24 NOTE — PROGRESS NOTES
[facility]  [address] [facility], [address] [place] [address]  ANGELES:[phone] EJ:[phone]  PHYSICAL THERAPY PROGRESS NOTE  Patient Name: Forest Huerta : 1945   Treatment/Medical Diagnosis: Pain in right hip [M25.551]  Abnormality of gait [R26.9]   Referral Source: Vero Resendiz DO     Date of Initial Visit: 23 Attended Visits: 8 Missed Visits: 0     SUMMARY OF TREATMENT  Patient seen for evaluation and 7 follow up sessions including     CURRENT STATUS    Short Term Goals: To be accomplished in 4 weeks  1 patient will have established and be I with HEP to aid with independence and self management at discharge  EVAL HEP issued at evaluation   Current:  Patient reports that he is not doing his HEP everyday ,10/24/23     2 patient will have FOTO 57 to aid with increase tolerance to ADLS and regular daily activities at home and in the community  EVAL 54  CURRENT 67 10/24/23     3 patient will have SL Abduction right LE 4- to aid with increase stability for ambulation with less [place] and increase I to ambulate with no AD for community activities  EVAL 3+  CURRENT   NA 10/24/23     Long Term Goals:  To be accomplished in 8 weeks  1 patient will have established and be I with HEP to aid with independence and self management at discharge  EVAL HEP issued at evaluation  CURRENT reports compliance with HEP 10/24/23     2 patient will have FOTO 61 to aid with increase tolerance to ADLS and regular daily activities at home and in the community  EVAL 54  CURRENT 67 10/24/23     3  patient will have SL Abduction right LE 4,4+ to aid with increase stability for ambulation with less [place] and increase I to ambulate with no AD for community activities  EVAL 3+  CURRENT NA 10/24/23     4 patient will ambulated 6 minutes level surface in clinic with no AD use and no LOB for carryover to community activities and activities at home  EVAL
Favian Villafana, PT    10/24/2023    8:56 AM    Future Appointments   Date Time Provider [address]   10/26/2023  8:50 AM [facility] Cordoba[facility]   10/26/2023  1:45 PM Deandre Chauhan MD [facility]   11/15/2023  9:00 AM Deandre Chauhan MD [place] [facility] AMB   12/12/2023  1:30 PM Cassie Chandler DO [facility] BS AMB

## 2023-10-26 ENCOUNTER — HOSPITAL ENCOUNTER (OUTPATIENT)
Facility: HOSPITAL | Age: 78
Setting detail: RECURRING SERIES
Discharge: HOME | End: 2023-10-29
Payer: MEDICARE

## 2023-10-26 ENCOUNTER — HOSPITAL ENCOUNTER (OUTPATIENT)
Facility: HOSPITAL | Age: 78
Discharge: HOME | End: 2023-10-26
Payer: MEDICARE

## 2023-10-26 ENCOUNTER — HOSPITAL ENCOUNTER (OUTPATIENT)
Facility: HOSPITAL | Age: 78
End: 2023-10-26
Payer: MEDICARE

## 2023-10-26 VITALS
TEMPERATURE: 97.9 F | DIASTOLIC BLOOD PRESSURE: 89 MMHG | RESPIRATION RATE: 18 BRPM | OXYGEN SATURATION: 87 % | HEART RATE: 66 BPM | SYSTOLIC BLOOD PRESSURE: 149 MMHG

## 2023-10-26 PROBLEM — M48.062 SPINAL STENOSIS, LUMBAR REGION, WITH NEUROGENIC CLAUDICATION: Status: ACTIVE | Noted: 2017-02-16

## 2023-10-26 PROCEDURE — 62323 NJX INTERLAMINAR LMBR/SAC: CPT | Performed by: PHYSICAL MEDICINE & REHABILITATION

## 2023-10-26 PROCEDURE — 97110 THERAPEUTIC EXERCISES: CPT

## 2023-10-26 PROCEDURE — 62323 NJX INTERLAMINAR LMBR/SAC: CPT

## 2023-10-26 PROCEDURE — 6360000002 HC RX W HCPCS: Performed by: PHYSICAL MEDICINE & REHABILITATION

## 2023-10-26 PROCEDURE — 2500000003 HC RX 250 WO HCPCS: Performed by: PHYSICAL MEDICINE & REHABILITATION

## 2023-10-26 PROCEDURE — 97530 THERAPEUTIC ACTIVITIES: CPT

## 2023-10-26 PROCEDURE — 6370000000 HC RX 637 (ALT 250 FOR IP): Performed by: PHYSICAL MEDICINE & REHABILITATION

## 2023-10-26 RX ORDER — DIAZEPAM 5 MG/1
2.5 TABLET ORAL ONCE
Status: COMPLETED | OUTPATIENT
Start: 2023-10-26 | End: 2023-10-26

## 2023-10-26 RX ORDER — DEXAMETHASONE SODIUM PHOSPHATE 10 MG/ML
10 INJECTION, SOLUTION INTRAMUSCULAR; INTRAVENOUS ONCE
Status: COMPLETED | OUTPATIENT
Start: 2023-10-26 | End: 2023-10-26

## 2023-10-26 RX ORDER — DIAZEPAM 5 MG/1
5 TABLET ORAL ONCE
Status: COMPLETED | OUTPATIENT
Start: 2023-10-26 | End: 2023-10-26

## 2023-10-26 RX ORDER — DIAZEPAM 5 MG/1
10 TABLET ORAL ONCE
Status: COMPLETED | OUTPATIENT
Start: 2023-10-26 | End: 2023-10-26

## 2023-10-26 RX ORDER — LIDOCAINE HYDROCHLORIDE 10 MG/ML
30 INJECTION, SOLUTION EPIDURAL; INFILTRATION; INTRACAUDAL; PERINEURAL ONCE
Status: COMPLETED | OUTPATIENT
Start: 2023-10-26 | End: 2023-10-26

## 2023-10-26 RX ADMIN — LIDOCAINE HYDROCHLORIDE 12 ML: 10 INJECTION, SOLUTION EPIDURAL; INFILTRATION; INTRACAUDAL; PERINEURAL at 14:17

## 2023-10-26 RX ADMIN — DIAZEPAM 5 MG: 5 TABLET ORAL at 12:40

## 2023-10-26 RX ADMIN — DEXAMETHASONE SODIUM PHOSPHATE 10 MG: 10 INJECTION, SOLUTION INTRAMUSCULAR; INTRAVENOUS at 14:19

## 2023-10-26 ASSESSMENT — PAIN SCALES - GENERAL: PAINLEVEL_OUTOF10: 0

## 2023-10-26 ASSESSMENT — PAIN - FUNCTIONAL ASSESSMENT: PAIN_FUNCTIONAL_ASSESSMENT: 0-10

## 2023-10-26 NOTE — H&P
H&P is on paper and was reviewed on 10/26/2023 before preforming the procedure. Pulmonary Progress Note      Patient: Gissel Samayoa Date: 2018   : 1961 Attending: Isreal Bernardo MD   56 year old female        Subjective:   The patient was seen and examined, she feels fine, she is complaining of left-sided shoulder pain, denied any shortness of breath, denied any chest pain currently, no cough     Objective:    Vital 24 Hour Range Last Value   Temperature Temp  Min: 96.6 °F (35.9 °C)  Max: 97.4 °F (36.3 °C) 97.2 °F (36.2 °C)   Pulse Pulse  Min: 90  Max: 93 90   Respiratory Resp  Min: 18  Max: 22 20   Non-Invasive  Blood Pressure BP  Min: 105/52  Max: 129/61 129/61 (18 0828)   PulseOximetry SpO2  Min: 96 %  Max: 98 % 98 %   Arterial   Blood Pressure No Data Recorded       Vital First Value Last Value   Weight Weight: (!) 145.6 kg (!) 142.4 kg   Height N/A 5' 6\" (167.6 cm)   BMI N/A 50.78       Weight over the past 48 Hours:  Patient Vitals for the past 48 hrs:   Weight   18 0600 (!) 141.5 kg   18 0650 (!) 142.4 kg       Intake/Output:    No intake/output data recorded.    I/O last 3 completed shifts:  In: 580 [P.O.:580]  Out: 10 [Chest Tube:10]      Intake/Output Summary (Last 24 hours) at 18 1529  Last data filed at 18 1400   Gross per 24 hour   Intake              580 ml   Output               10 ml   Net              570 ml       Medications:    • docusate sodium-sennosides  1 tablet Oral Nightly   • metoPROLOL tartrate  12.5 mg Oral 2 times per day   • vitamin - therapeutic multivitamins w/minerals  1 tablet Oral Daily   • cholecalciferol  1,000 Units Oral Daily   • calcium citrate-vitamin D  1 tablet Oral BID WC   • furosemide  40 mg Intravenous Once   • pantoprazole  40 mg Oral Nightly   • sodium chloride (PF)  2 mL Injection 2 times per day         Physical Exam:     General: Not in distress  Head: Normocephalic atraumatic  Neck: supple  Heart: Normal S1, S2  Chest: Decreased bilateral  breathsounds, normal vesicular breathing, no crackles, no  wheezing  Abdomen: Soft, non tender, good bowel sounds  Lower Extremities: No edema  Neuro: Awake and oriented, nofocal deficit  Musculoskeletal: no focal deformity     Laboratory Results:    Lab Results   Component Value Date    SODIUM 139 04/09/2018    POTASSIUM 4.1 04/09/2018    BUN 6 04/09/2018    CREATININE 0.76 04/09/2018    WBC 8.0 04/09/2018    HCT 29.6 (L) 04/09/2018    HGB 9.4 (L) 04/09/2018     04/09/2018    INR 1.2 04/07/2018     (H) 08/21/2016    MMB 0.8 08/21/2016    RAPDTR <0.02 03/24/2018    PTT 30 03/25/2018    GLUCOSE 86 04/09/2018    TSH 1.240 01/08/2018    BNP <5 03/24/2018    CHLORIDE 106 04/09/2018    PT 11.9 (H) 04/07/2018    CO2 27 04/09/2018       ABG    No results found for: APH, APO2, ASAT, FIO2, AHCO3, APCO2    Imaging:    CT scan of the chest  (reviewed personally by me from 4/8/2018): IMPRESSION:  1. Interval left-sided basilar chest tube placement with significant  decrease in the amount of pleural effusion. Mild persistent effusion with  consolidative airspace opacity persists. This could reflect atelectasis or  infectious etiology.  2. Round, cavitary focus within the left lower lobe just anterior to the  chest tube which may reflect a partially fluid-filled bulla or infectious  cavitary collection. Multiple small foci of adjacent air are noted within  the fluid surrounding the cavitary lesion. Follow-up is recommended.  3. Moderate size dependent right pleural effusion.  4. Mild right lower lobe airspace opacity likely atelectasis.  5. Redemonstration of a intermediate to hyperdense masslike lesion within  the lower mediastinum at the level of the diaphragm. Findings could reflect  soft tissue mass, hiatal hernia or hematoma. This is not significantly  changed since previous exam. Follow-up is recommended.  6. Redemonstration of a low-density focus within the medial aspect of the  spleen possibly low-density lesion versus a sequela of trauma.  7. Low density focus  within the right hepatic lobe, incompletely assessed  by this exam. Recommend nonemergent ultrasound follow-up for further  characterization.    Pleural fluid culture: Negative to date    Pleural fluid analysis from 3/31/2018: Bloody exudative pleural effusion    Chest tube: 60 ML in the last 24 hours    Chest x-ray from 4/9/2018, reviewed personally: Bilateral pleural effusion, left more than right    Assessment:    large left hemorrhagic/bloody, exudative pleural effusion:  Following sleeve gastrectomy for morbid obesity  Etiology likely spontaneous left sided hemorrhagic effusion due to supratherapeutic INR on admission and/or related to sleeve gastrectomy.  Status post chest tube by interventional radiology on 4/3/2018, still draining     Completed 6 doses of TPA  4/6/18. Chest x-ray from this morning to me appears unchanged from previous.     Repeat CT scan chest from 4/8/2018 as above, with improvement but evidence of applications, and cavity,    Plan:  Chest tube to suction at -20 cm with pressure  The patient received tPA for 6 doses  The patient will be transferred to [facility] for surgical evaluation and possible thoracoscopic drainage      Electronically Signed      Reshma Jama M.D.  Pulmonary, Critical Care and Sleep Medicine  Pager: [phone]

## 2023-10-26 NOTE — OP NOTE
Intralaminar Epidural Steroid Block Procedure Note      Patient Name: Nilton Rosenthal    Date of Procedure: October 26, 2023    Preoperative Diagnosis: M48.062    Post Operative Diagnosis: same    Procedure: L3-L4 Epidural Block     PROCEDURE:  Lumbar Epidural Block    Consent:  Informed  Consent was obtained prior to the procedure. The patient was given the opportunity to ask questions regarding the procedure and its associated risks. In addition to the potential risks associated with the procedure itself, the patient was informed both verbally and in writing of potential side effects of the use glucocorticoids. The patient appeared to comprehend the informed consent and desired to have the procedure performed. The patient was placed in the prone position on the fluoroscopy table and the back prepped and draped in the usual sterile manner. The interlaminar space was identified using fluoroscopy and the skin anesthetized with 1% Lidocaine. A #18 Tuohy Epidural needle was advanced under fluoroscopic control from a paramedian approach to the  epidural space as noted above, using the loss of resistance to fluid technique. Then, 10 mg of preservative free Dexamethasone and 2 cc of Lidocaine was slowly injected. The patient tolerated the procedure well. The injection area was cleaned and band aids applied. No excessive bleeding was noted. Patient dressed and was discharged to home with instructions.

## 2023-10-26 NOTE — PROGRESS NOTES
PHYSICAL / OCCUPATIONAL THERAPY - DAILY TREATMENT NOTE (updated )    Patient Name: Nilton Rosenthal    Date: 10/26/2023    : 1945  Insurance: Payor: MEDICARE / Plan: MEDICARE PART A AND B / Product Type: *No Product type* /      Patient  verified Yes     Visit #   Current / Total 9 16   Time   In / Out 853 935   Pain   In / Out 0 0   Subjective Functional Status/Changes: Patient complains of no R hip pain today      TREATMENT AREA =  Pain in right hip [M25.551]  Abnormality of gait [R26.9]    OBJECTIVE      Therapeutic Procedures: Tx Min Billable or 1:1 Min (if diff from Tx Min) Procedure, Rationale, Specifics   32  28603 Therapeutic Exercise (timed):  increase ROM, strength, coordination, balance, and proprioception to improve patient's ability to progress to PLOF and address remaining functional goals. (see flow sheet as applicable)     Details if applicable:       10  99276 Therapeutic Activity (timed):  use of dynamic activities replicating functional movements to increase ROM, strength, coordination, balance, and proprioception in order to improve patient's ability to progress to PLOF and address remaining functional goals. (see flow sheet as applicable)     Details if applicable:            Details if applicable:            Details if applicable:            Details if applicable:     43  [facility] Totals Reminder: bill using total billable min of TIMED therapeutic procedures (example: do not include dry needle or estim unattended, both untimed codes, in totals to left)  8-22 min = 1 unit; 23-37 min = 2 units; 38-52 min = 3 units; 53-67 min = 4 units; 68-82 min = 5 units   Total Total     [x]  Patient Education billed concurrently with other procedures   [x] Review HEP    [] Progressed/Changed HEP, detail:    [] Other detail:       Objective Information/Functional Measures/Assessment  Patient needs Vcs for proper technique for exercises as mentioned in therapy  flow sheet . Ritchie Candelario Patient Tolerated PT

## 2023-10-31 ENCOUNTER — HOSPITAL ENCOUNTER (OUTPATIENT)
Facility: HOSPITAL | Age: 78
Setting detail: RECURRING SERIES
Discharge: HOME | End: 2023-11-03
Payer: MEDICARE

## 2023-10-31 PROCEDURE — 97112 NEUROMUSCULAR REEDUCATION: CPT

## 2023-10-31 PROCEDURE — 97530 THERAPEUTIC ACTIVITIES: CPT

## 2023-10-31 PROCEDURE — 97110 THERAPEUTIC EXERCISES: CPT

## 2023-10-31 NOTE — PROGRESS NOTES
PHYSICAL / OCCUPATIONAL THERAPY - DAILY TREATMENT NOTE (updated )    Patient Name: Chavo Santana    Date: 10/31/2023    : 1945  Insurance: Payor: MEDICARE / Plan: MEDICARE PART A AND B / Product Type: *No Product type* /      Patient  verified Yes     Visit #   Current / Total 10 16   Time   In / Out 730 815   Pain   In / Out 0   0   Subjective Functional Status/Changes: CCO stiffness, notes he did have an injection in his back and it helped. TREATMENT AREA =  Pain in right hip [M25.551]  Abnormality of gait [R26.9]    OBJECTIVE         Therapeutic Procedures: Tx Min Billable or 1:1 Min (if diff from Tx Min) Procedure, Rationale, Specifics   20 20 85216 Therapeutic Exercise (timed):  increase ROM, strength, coordination, balance, and proprioception to improve patient's ability to progress to PLOF and address remaining functional goals. (see flow sheet as applicable)     Details if applicable:       17  86829 Neuromuscular Re-Education (timed):  improve balance, coordination, kinesthetic sense, posture, core stability and proprioception to improve patient's ability to develop conscious control of individual muscles and awareness of position of extremities in order to progress to PLOF and address remaining functional goals. (see flow sheet as applicable)     Details if applicable:     8 8 14414 Therapeutic Activity (timed):  use of dynamic activities replicating functional movements to increase ROM, strength, coordination, balance, and proprioception in order to improve patient's ability to progress to PLOF and address remaining functional goals.   (see flow sheet as applicable)     Details if applicable:            Details if applicable:            Details if applicable:     39 45 [address] [place] Reminder: bill using total billable min of TIMED therapeutic procedures (example: do not include dry needle or estim unattended, both untimed codes, in totals to left)  8-22 min = 1 unit; 23-37 min = 2

## 2023-11-02 ENCOUNTER — HOSPITAL ENCOUNTER (OUTPATIENT)
Facility: HOSPITAL | Age: 78
Setting detail: RECURRING SERIES
Discharge: HOME | End: 2023-11-05
Payer: MEDICARE

## 2023-11-02 PROCEDURE — 97530 THERAPEUTIC ACTIVITIES: CPT

## 2023-11-02 PROCEDURE — 97110 THERAPEUTIC EXERCISES: CPT

## 2023-11-02 PROCEDURE — 97116 GAIT TRAINING THERAPY: CPT

## 2023-11-02 NOTE — PROGRESS NOTES
8:10 AM Claudette Alvares, PT [facility]   11/28/2023  8:50 AM Claudette Alvares, PT [facility]   11/30/2023  8:50 AM Geraldine Gray, PTA [facility]   12/12/2023  1:30 PM Jaxson Chandler DO [facility] BS AMB

## 2023-11-07 ENCOUNTER — HOSPITAL ENCOUNTER (OUTPATIENT)
Facility: HOSPITAL | Age: 78
Setting detail: RECURRING SERIES
Discharge: HOME | End: 2023-11-10
Payer: MEDICARE

## 2023-11-07 PROCEDURE — 97110 THERAPEUTIC EXERCISES: CPT

## 2023-11-07 PROCEDURE — 97112 NEUROMUSCULAR REEDUCATION: CPT

## 2023-11-07 PROCEDURE — 97530 THERAPEUTIC ACTIVITIES: CPT

## 2023-11-07 NOTE — PROGRESS NOTES
PHYSICAL / OCCUPATIONAL THERAPY - DAILY TREATMENT NOTE (updated )    Patient Name: Billie Arvizu    Date: 2023    : 1945  Insurance: Payor: MEDICARE / Plan: MEDICARE PART A AND B / Product Type: *No Product type* /      Patient  verified Yes     Visit #   Current / Total 12 16   Time   In / Out 851 936   Pain   In / Out 0 0   Subjective Functional Status/Changes: Denies pain at arrival      TREATMENT AREA =  Pain in right hip [M25.551]  Abnormality of gait [R26.9]    OBJECTIVE         Therapeutic Procedures: Tx Min Billable or 1:1 Min (if diff from Tx Min) Procedure, Rationale, Specifics   20 20 87390 Therapeutic Exercise (timed):  increase ROM, strength, coordination, balance, and proprioception to improve patient's ability to progress to PLOF and address remaining functional goals. (see flow sheet as applicable)     Details if applicable:       15 15 12390 Neuromuscular Re-Education (timed):  improve balance, coordination, kinesthetic sense, posture, core stability and proprioception to improve patient's ability to develop conscious control of individual muscles and awareness of position of extremities in order to progress to PLOF and address remaining functional goals. (see flow sheet as applicable)     Details if applicable:     10 10 77308 Therapeutic Activity (timed):  use of dynamic activities replicating functional movements to increase ROM, strength, coordination, balance, and proprioception in order to improve patient's ability to progress to PLOF and address remaining functional goals.   (see flow sheet as applicable)     Details if applicable:            Details if applicable:            Details if applicable:     39 45 [address] [place] Reminder: bill using total billable min of TIMED therapeutic procedures (example: do not include dry needle or estim unattended, both untimed codes, in totals to left)  8-22 min = 1 unit; 23-37 min = 2 units; 38-52 min = 3 units; 53-67 min = 4 units;

## 2023-11-09 ENCOUNTER — HOSPITAL ENCOUNTER (OUTPATIENT)
Facility: HOSPITAL | Age: 78
Setting detail: RECURRING SERIES
Discharge: HOME | End: 2023-11-12
Payer: MEDICARE

## 2023-11-09 PROCEDURE — 97112 NEUROMUSCULAR REEDUCATION: CPT

## 2023-11-09 PROCEDURE — 97110 THERAPEUTIC EXERCISES: CPT

## 2023-11-09 PROCEDURE — 97530 THERAPEUTIC ACTIVITIES: CPT

## 2023-11-09 NOTE — PROGRESS NOTES
PHYSICAL / OCCUPATIONAL THERAPY - DAILY TREATMENT NOTE (updated )    Patient Name: Harsh Abt    Date: 2023    : 1945  Insurance: Payor: MEDICARE / Plan: MEDICARE PART A AND B / Product Type: *No Product type* /      Patient  verified Yes     Visit #   Current / Total 13 16   Time   In / Out 854 938   Pain   In / Out 0 0   Subjective Functional Status/Changes: I know those muscles are weak. I was able to get a ball online at [employer] TREATMENT AREA =  Pain in right hip [M25.551]  Abnormality of gait [R26.9]    OBJECTIVE         Therapeutic Procedures: Tx Min Billable or 1:1 Min (if diff from Tx Min) Procedure, Rationale, Specifics   14 14 51608 Therapeutic Exercise (timed):  increase ROM, strength, coordination, balance, and proprioception to improve patient's ability to progress to PLOF and address remaining functional goals. (see flow sheet as applicable)     Details if applicable:       18 18 10343 Neuromuscular Re-Education (timed):  improve balance, coordination, kinesthetic sense, posture, core stability and proprioception to improve patient's ability to develop conscious control of individual muscles and awareness of position of extremities in order to progress to PLOF and address remaining functional goals. (see flow sheet as applicable)     Details if applicable:     12  34680 Therapeutic Activity (timed):  use of dynamic activities replicating functional movements to increase ROM, strength, coordination, balance, and proprioception in order to improve patient's ability to progress to PLOF and address remaining functional goals.   (see flow sheet as applicable)     Details if applicable:            Details if applicable:            Details if applicable:       [facility] Totals Reminder: bill using total billable min of TIMED therapeutic procedures (example: do not include dry needle or estim unattended, both untimed codes, in totals to left)  8-22 min = 1 unit; 23-37 min = 2

## 2023-11-14 ENCOUNTER — HOSPITAL ENCOUNTER (OUTPATIENT)
Facility: HOSPITAL | Age: 78
Setting detail: RECURRING SERIES
Discharge: HOME | End: 2023-11-17
Payer: MEDICARE

## 2023-11-14 PROCEDURE — 97530 THERAPEUTIC ACTIVITIES: CPT

## 2023-11-14 PROCEDURE — 97110 THERAPEUTIC EXERCISES: CPT

## 2023-11-14 PROCEDURE — 97112 NEUROMUSCULAR REEDUCATION: CPT

## 2023-11-14 NOTE — PROGRESS NOTES
pain.    Diagnoses and all orders for this visit:    Spinal stenosis of lumbar region with neurogenic claudication    Lumbar neuritis    DDD (degenerative disc disease), lumbosacral    Lumbosacral spondylosis without myelopathy    Facet arthropathy, lumbar    Spondylolisthesis, lumbar region    Foraminal stenosis of lumbosacral region          IMPRESSION AND PLAN:  Patient returns to the office today with c/o low back pain radiating to bilateral groin. Multiple treatment options were discussed. Patient has one more injection between now and 12/22/2023. I discussed spinal surgery and injections, but the patient is not interested in surgery or injection at this time. He does not believe his symptoms are severe enough to warrant additional treatment at this time. Patient is neurologically intact. I will see the patient back as needed. Written by Stefano Bob, as dictated by Ruma Thomason MD  I examined the patient, reviewed and agree with the note.

## 2023-11-14 NOTE — PROGRESS NOTES
PHYSICAL / OCCUPATIONAL THERAPY - DAILY TREATMENT NOTE (updated )    Patient Name: Benjamin Veras    Date: 2023    : 1945  Insurance: Payor: MEDICARE / Plan: MEDICARE PART A AND B / Product Type: *No Product type* /      Patient  verified Yes     Visit #   Current / Total 14 16   Time   In / Out 850 938   Pain   In / Out 0 0   Subjective Functional Status/Changes:  Denies pain at arrival, reports compliance with exercises at home. TREATMENT AREA =  Pain in right hip [M25.551]  Abnormality of gait [R26.9]    OBJECTIVE         Therapeutic Procedures: Tx Min Billable or 1:1 Min (if diff from Tx Min) Procedure, Rationale, Specifics   16 16 19280 Therapeutic Exercise (timed):  increase ROM, strength, coordination, balance, and proprioception to improve patient's ability to progress to PLOF and address remaining functional goals. (see flow sheet as applicable)     Details if applicable:       20 20 40656 Neuromuscular Re-Education (timed):  improve balance, coordination, kinesthetic sense, posture, core stability and proprioception to improve patient's ability to develop conscious control of individual muscles and awareness of position of extremities in order to progress to PLOF and address remaining functional goals. (see flow sheet as applicable)     Details if applicable:      99384 Therapeutic Activity (timed):  use of dynamic activities replicating functional movements to increase ROM, strength, coordination, balance, and proprioception in order to improve patient's ability to progress to PLOF and address remaining functional goals.   (see flow sheet as applicable)     Details if applicable:            Details if applicable:            Details if applicable:       [facility] Totals Reminder: bill using total billable min of TIMED therapeutic procedures (example: do not include dry needle or estim unattended, both untimed codes, in totals to left)  8-22 min = 1 unit; 23-37 min = 2 units;

## 2023-11-15 ENCOUNTER — OFFICE VISIT (OUTPATIENT)
Age: 78
End: 2023-11-15
Payer: MEDICARE

## 2023-11-15 VITALS
TEMPERATURE: 97.4 F | WEIGHT: 315 LBS | HEART RATE: 58 BPM | SYSTOLIC BLOOD PRESSURE: 138 MMHG | DIASTOLIC BLOOD PRESSURE: 68 MMHG | OXYGEN SATURATION: 97 % | BODY MASS INDEX: 41.75 KG/M2 | HEIGHT: 73 IN

## 2023-11-15 DIAGNOSIS — M48.07 FORAMINAL STENOSIS OF LUMBOSACRAL REGION: ICD-10-CM

## 2023-11-15 DIAGNOSIS — M47.817 LUMBOSACRAL SPONDYLOSIS WITHOUT MYELOPATHY: ICD-10-CM

## 2023-11-15 DIAGNOSIS — M47.816 FACET ARTHROPATHY, LUMBAR: ICD-10-CM

## 2023-11-15 DIAGNOSIS — M43.16 SPONDYLOLISTHESIS, LUMBAR REGION: ICD-10-CM

## 2023-11-15 DIAGNOSIS — M48.062 SPINAL STENOSIS OF LUMBAR REGION WITH NEUROGENIC CLAUDICATION: Primary | ICD-10-CM

## 2023-11-15 DIAGNOSIS — M54.16 LUMBAR NEURITIS: ICD-10-CM

## 2023-11-15 DIAGNOSIS — M51.37 DDD (DEGENERATIVE DISC DISEASE), LUMBOSACRAL: ICD-10-CM

## 2023-11-15 PROCEDURE — G8484 FLU IMMUNIZE NO ADMIN: HCPCS | Performed by: PHYSICAL MEDICINE & REHABILITATION

## 2023-11-15 PROCEDURE — G8417 CALC BMI ABV UP PARAM F/U: HCPCS | Performed by: PHYSICAL MEDICINE & REHABILITATION

## 2023-11-15 PROCEDURE — 1036F TOBACCO NON-USER: CPT | Performed by: PHYSICAL MEDICINE & REHABILITATION

## 2023-11-15 PROCEDURE — G8427 DOCREV CUR MEDS BY ELIG CLIN: HCPCS | Performed by: PHYSICAL MEDICINE & REHABILITATION

## 2023-11-15 PROCEDURE — 1123F ACP DISCUSS/DSCN MKR DOCD: CPT | Performed by: PHYSICAL MEDICINE & REHABILITATION

## 2023-11-15 PROCEDURE — 99213 OFFICE O/P EST LOW 20 MIN: CPT | Performed by: PHYSICAL MEDICINE & REHABILITATION

## 2023-11-16 ENCOUNTER — TELEPHONE (OUTPATIENT)
Facility: HOSPITAL | Age: 78
End: 2023-11-16

## 2023-11-16 ENCOUNTER — HOSPITAL ENCOUNTER (OUTPATIENT)
Facility: HOSPITAL | Age: 78
Setting detail: RECURRING SERIES
End: 2023-11-16
Payer: MEDICARE

## 2023-11-16 NOTE — TELEPHONE ENCOUNTER
Patient cancelled appt. on 11/16/23 at 7:33am due to the patient being sick and he is not able to make his appt. today.

## 2023-11-20 ENCOUNTER — HOSPITAL ENCOUNTER (OUTPATIENT)
Facility: HOSPITAL | Age: 78
Setting detail: RECURRING SERIES
Discharge: HOME | End: 2023-11-23
Payer: MEDICARE

## 2023-11-20 PROCEDURE — 97112 NEUROMUSCULAR REEDUCATION: CPT

## 2023-11-20 PROCEDURE — 97535 SELF CARE MNGMENT TRAINING: CPT

## 2023-11-20 PROCEDURE — 97110 THERAPEUTIC EXERCISES: CPT

## 2023-11-20 PROCEDURE — 97530 THERAPEUTIC ACTIVITIES: CPT

## 2023-11-20 NOTE — PROGRESS NOTES
PHYSICAL / OCCUPATIONAL THERAPY - DAILY TREATMENT NOTE (updated )    Patient Name: Benjamin Veras    Date: 2023    : 1945  Insurance: Payor: MEDICARE / Plan: MEDICARE PART A AND B / Product Type: *No Product type* /      Patient  verified Yes     Visit #   Current / Total 15 16   Time   In / Out 848 941   Pain   In / Out 0 0   Subjective Functional Status/Changes: No pain, just stiffness and mostly in my back       TREATMENT AREA =  Pain in right hip [M25.551]  Abnormality of gait [R26.9]    OBJECTIVE         Therapeutic Procedures: Tx Min Billable or 1:1 Min (if diff from Tx Min) Procedure, Rationale, Specifics   16 16 23489 Therapeutic Exercise (timed):  increase ROM, strength, coordination, balance, and proprioception to improve patient's ability to progress to PLOF and address remaining functional goals. (see flow sheet as applicable)     Details if applicable:       12 12 14634 Therapeutic Activity (timed):  use of dynamic activities replicating functional movements to increase ROM, strength, coordination, balance, and proprioception in order to improve patient's ability to progress to PLOF and address remaining functional goals. (see flow sheet as applicable)     Details if applicable:     8 8 16934 Self Care/Home Management (timed):  improve patient knowledge and understanding of positioning, posture/ergonomics, home safety, activity modification, diagnosis/prognosis, and physical therapy expectations, procedures and progression  to improve patient's ability to progress to PLOF and address remaining functional goals.   (see flow sheet as applicable)     Details if applicable:     17 17    63392 Neuromuscular Re-Education (timed):  improve balance, coordination, kinesthetic sense, posture, core stability and proprioception to improve patient's ability to develop conscious control of individual muscles and awareness of position of extremities in order to progress to PLOF and address

## 2023-11-22 ENCOUNTER — HOSPITAL ENCOUNTER (OUTPATIENT)
Facility: HOSPITAL | Age: 78
Setting detail: RECURRING SERIES
Discharge: HOME | End: 2023-11-25
Payer: MEDICARE

## 2023-11-22 PROCEDURE — 97110 THERAPEUTIC EXERCISES: CPT

## 2023-11-22 PROCEDURE — 97112 NEUROMUSCULAR REEDUCATION: CPT

## 2023-11-22 PROCEDURE — 97530 THERAPEUTIC ACTIVITIES: CPT

## 2023-11-22 NOTE — PROGRESS NOTES
PHYSICAL / OCCUPATIONAL THERAPY - DAILY TREATMENT NOTE (updated )    Patient Name: Alyssa Stewart    Date: 2023    : 1945  Insurance: Payor: MEDICARE / Plan: MEDICARE PART A AND B / Product Type: *No Product type* /      Patient  verified Yes     Visit #   Current / Total 16 16   Time   In / Out 807 901   Pain   In / Out 0 0   Subjective Functional Status/Changes: I am still a little weak from being sick. TREATMENT AREA =  Pain in right hip [M25.551]  Abnormality of gait [R26.9]    OBJECTIVE         Therapeutic Procedures: Tx Min Billable or 1:1 Min (if diff from Tx Min) Procedure, Rationale, Specifics   24 24 09290 Therapeutic Exercise (timed):  increase ROM, strength, coordination, balance, and proprioception to improve patient's ability to progress to PLOF and address remaining functional goals. (see flow sheet as applicable)     Details if applicable:       15 15 77302 Therapeutic Activity (timed):  use of dynamic activities replicating functional movements to increase ROM, strength, coordination, balance, and proprioception in order to improve patient's ability to progress to PLOF and address remaining functional goals. (see flow sheet as applicable)     Details if applicable:     15 15 91524 Neuromuscular Re-Education (timed):  improve balance, coordination, kinesthetic sense, posture, core stability and proprioception to improve patient's ability to develop conscious control of individual muscles and awareness of position of extremities in order to progress to PLOF and address remaining functional goals.  (see flow sheet as applicable)     Details if applicable:            Details if applicable:            Details if applicable:     47 54 [facility] Totals Reminder: bill using total billable min of TIMED therapeutic procedures (example: do not include dry needle or estim unattended, both untimed codes, in totals to left)  8-22 min = 1 unit; 23-37 min = 2 units; 38-52 min = 3 units;

## 2023-11-22 NOTE — PROGRESS NOTES
[facility]  [address] [facility], [address] [place] [address]  MZ:[phone] RL:[phone]  CONTINUED PLAN OF CARE/RECERTIFICATION FOR PHYSICAL THERAPY          Patient Name: Ramya Nunez : 1945   Treatment/Medical Diagnosis: Pain in right hip [M25.551]  Abnormality of gait [R26.9]   Onset Date: P/O May 2022, MD order 23    Referral Source: Boo Madrigal DO [facility]): 23   Prior Hospitalization: See Medical History Provider #: [id number]   Prior Level of Function:  I all areas of ADLs and activities, gym for exercise, tolerated household and community activities, drove    Comorbidities:  left YUDI  (postero lateral per reports), right YUDI (posterolateral approach per reports) May 24, 2022, Graves Disease , thryoid problem, sleep apnea, eye surgery, back arthritis spinal stenosis, spinal injections 2022   Visits from [facility]: 16 Missed Visits: 1     Progress to Goals:  Short Term Goals: To be accomplished in 4 weeks  1 patient will have established and be I with HEP to aid with independence and self management at discharge  EVAL HEP issued at evaluation   Current:  Patient reports that he is  doing  some of his HEP everyday has obtained swiss ball  23     2 patient will have FOTO 57 to aid with increase tolerance to ADLS and regular daily activities at home and in the community  EVAL 54  CURRENT 67 10/24/23      72 23     3 patient will have SL Abduction right LE 4- to aid with increase stability for ambulation with less [place] and increase I to ambulate with no AD for community activities  EVAL 3+  CURRENT  MMT 3+ 23     3+,4- 23     Long Term Goals:  To be accomplished in 8 weeks  1 patient will have established and be I with HEP to aid with independence and self management at discharge  EVAL HEP issued at evaluation  CURRENT reports compliance with HEP 23     2 patient will have

## 2023-11-28 ENCOUNTER — HOSPITAL ENCOUNTER (OUTPATIENT)
Facility: HOSPITAL | Age: 78
Setting detail: RECURRING SERIES
Discharge: HOME | End: 2023-12-01
Payer: MEDICARE

## 2023-11-28 PROCEDURE — 97112 NEUROMUSCULAR REEDUCATION: CPT

## 2023-11-28 PROCEDURE — 97530 THERAPEUTIC ACTIVITIES: CPT

## 2023-11-28 PROCEDURE — 97110 THERAPEUTIC EXERCISES: CPT

## 2023-11-30 ENCOUNTER — HOSPITAL ENCOUNTER (OUTPATIENT)
Facility: HOSPITAL | Age: 78
Setting detail: RECURRING SERIES
End: 2023-11-30
Payer: MEDICARE

## 2023-11-30 PROCEDURE — 97110 THERAPEUTIC EXERCISES: CPT

## 2023-11-30 PROCEDURE — 97530 THERAPEUTIC ACTIVITIES: CPT

## 2023-11-30 PROCEDURE — 97112 NEUROMUSCULAR REEDUCATION: CPT

## 2023-11-30 NOTE — PROGRESS NOTES
PHYSICAL / OCCUPATIONAL THERAPY - DAILY TREATMENT NOTE (updated )    Patient Name: Chris Guevara    Date: 2023    : 1945  Insurance: Payor: MEDICARE / Plan: MEDICARE PART A AND B / Product Type: *No Product type* /      Patient  verified Yes     Visit #   Current / Total 2 16    Time   In / Out 850 am  934 am    Pain   In / Out 0/10  010    Subjective Functional Status/Changes: Patient denies any pain today and reports increased stiffness in his hip today. TREATMENT AREA =  Pain in right hip [M25.551]  Abnormality of gait [R26.9]    OBJECTIVE         Therapeutic Procedures: Tx Min Billable or 1:1 Min (if diff from Tx Min) Procedure, Rationale, Specifics   14  02000 Therapeutic Exercise (timed):  increase ROM, strength, coordination, balance, and proprioception to improve patient's ability to progress to PLOF and address remaining functional goals. (see flow sheet as applicable)     Details if applicable:       15  64102 Neuromuscular Re-Education (timed):  improve balance, coordination, kinesthetic sense, posture, core stability and proprioception to improve patient's ability to develop conscious control of individual muscles and awareness of position of extremities in order to progress to PLOF and address remaining functional goals. (see flow sheet as applicable)     Details if applicable:     15  08826 Therapeutic Activity (timed):  use of dynamic activities replicating functional movements to increase ROM, strength, coordination, balance, and proprioception in order to improve patient's ability to progress to PLOF and address remaining functional goals.   (see flow sheet as applicable)     Details if applicable:            Details if applicable:            Details if applicable:     40  [facility] Totals Reminder: bill using total billable min of TIMED therapeutic procedures (example: do not include dry needle or estim unattended, both untimed codes, in totals to left)  8-22 min = 1

## 2023-12-04 ENCOUNTER — HOSPITAL ENCOUNTER (OUTPATIENT)
Facility: HOSPITAL | Age: 78
Setting detail: RECURRING SERIES
Discharge: HOME | End: 2023-12-07
Payer: MEDICARE

## 2023-12-04 PROCEDURE — 97110 THERAPEUTIC EXERCISES: CPT

## 2023-12-04 PROCEDURE — 97530 THERAPEUTIC ACTIVITIES: CPT

## 2023-12-04 PROCEDURE — 97112 NEUROMUSCULAR REEDUCATION: CPT

## 2023-12-04 NOTE — PROGRESS NOTES
PHYSICAL / OCCUPATIONAL THERAPY - DAILY TREATMENT NOTE (updated )    Patient Name: Alida Main    Date: 2023    : 1945  Insurance: Payor: MEDICARE / Plan: MEDICARE PART A AND B / Product Type: *No Product type* /      Patient  verified Yes     Visit #   Current / Total 3 16   Time   In / Out 9:29 10:15   Pain   In / Out 0 0   Subjective Functional Status/Changes: Patient stated that he is still working on his gait, but has been walking a little more without an AD. TREATMENT AREA =  Pain in right hip [M25.551]  Abnormality of gait [R26.9]    OBJECTIVE    Therapeutic Procedures: Tx Min Billable or 1:1 Min (if diff from Tx Min) Procedure, Rationale, Specifics   21  40145 Therapeutic Exercise (timed):  increase ROM, strength, coordination, balance, and proprioception to improve patient's ability to progress to PLOF and address remaining functional goals. (see flow sheet as applicable)     Details if applicable:            15  50101 Neuromuscular Re-Education (timed):  improve balance, coordination, kinesthetic sense, posture, core stability and proprioception to improve patient's ability to develop conscious control of individual muscles and awareness of position of extremities in order to progress to PLOF and address remaining functional goals. (see flow sheet as applicable)     Details if applicable:     10  96197 Therapeutic Activity (timed):  use of dynamic activities replicating functional movements to increase ROM, strength, coordination, balance, and proprioception in order to improve patient's ability to progress to PLOF and address remaining functional goals.   (see flow sheet as applicable)     Details if applicable:            Details if applicable:     55  [facility] Totals Reminder: bill using total billable min of TIMED therapeutic procedures (example: do not include dry needle or estim unattended, both untimed codes, in totals to left)  8-22 min = 1 unit; 23-37 min = 2 units;

## 2023-12-07 ENCOUNTER — HOSPITAL ENCOUNTER (OUTPATIENT)
Facility: HOSPITAL | Age: 78
Setting detail: RECURRING SERIES
Discharge: HOME | End: 2023-12-10
Payer: MEDICARE

## 2023-12-07 PROCEDURE — 97110 THERAPEUTIC EXERCISES: CPT

## 2023-12-07 PROCEDURE — 97112 NEUROMUSCULAR REEDUCATION: CPT

## 2023-12-07 PROCEDURE — 97530 THERAPEUTIC ACTIVITIES: CPT

## 2023-12-07 NOTE — PROGRESS NOTES
PHYSICAL / OCCUPATIONAL THERAPY - DAILY TREATMENT NOTE (updated )    Patient Name: Julee Villarreal    Date: 2023    : 1945  Insurance: Payor: MEDICARE / Plan: MEDICARE PART A AND B / Product Type: *No Product type* /      Patient  verified Yes     Visit #   Current / Total 4 16   Time   In / Out 930 1015   Pain   In / Out 0 0   Subjective Functional Status/Changes: I am not having any pain, just stiffness, like chun feeling when I first get up. Mostly in the back. TREATMENT AREA =  Pain in right hip [M25.551]  Abnormality of gait [R26.9]    OBJECTIVE         Therapeutic Procedures: Tx Min Billable or 1:1 Min (if diff from Tx Min) Procedure, Rationale, Specifics   15 15 70637 Therapeutic Exercise (timed):  increase ROM, strength, coordination, balance, and proprioception to improve patient's ability to progress to PLOF and address remaining functional goals. (see flow sheet as applicable)     Details if applicable:       15 15 92007 Neuromuscular Re-Education (timed):  improve balance, coordination, kinesthetic sense, posture, core stability and proprioception to improve patient's ability to develop conscious control of individual muscles and awareness of position of extremities in order to progress to PLOF and address remaining functional goals. (see flow sheet as applicable)     Details if applicable:     15 15 32936 Therapeutic Activity (timed):  use of dynamic activities replicating functional movements to increase ROM, strength, coordination, balance, and proprioception in order to improve patient's ability to progress to PLOF and address remaining functional goals.   (see flow sheet as applicable)     Details if applicable:            Details if applicable:            Details if applicable:     39 45 [address] [facility] Reminder: bill using total billable min of TIMED therapeutic procedures (example: do not include dry needle or estim unattended, both untimed codes, in totals to left)  8-

## 2023-12-11 ENCOUNTER — HOSPITAL ENCOUNTER (OUTPATIENT)
Facility: HOSPITAL | Age: 78
Setting detail: RECURRING SERIES
Discharge: HOME | End: 2023-12-14
Payer: MEDICARE

## 2023-12-11 PROCEDURE — 97112 NEUROMUSCULAR REEDUCATION: CPT

## 2023-12-11 PROCEDURE — 97110 THERAPEUTIC EXERCISES: CPT

## 2023-12-11 PROCEDURE — 97530 THERAPEUTIC ACTIVITIES: CPT

## 2023-12-11 NOTE — PROGRESS NOTES
PHYSICAL / OCCUPATIONAL THERAPY - DAILY TREATMENT NOTE (updated )    Patient Name: Mohammed Duane    Date: 2023    : 1945  Insurance: Payor: MEDICARE / Plan: MEDICARE PART A AND B / Product Type: *No Product type* /      Patient  verified Yes     Visit #   Current / Total 5 16    Time   In / Out 923 am  1010 am    Pain   In / Out 0/10  0/10   Subjective Functional Status/Changes: \"No pain. I'm just a little chun. \"      TREATMENT AREA =  Pain in right hip [M25.551]  Abnormality of gait [R26.9]    OBJECTIVE         Therapeutic Procedures: Tx Min Billable or 1:1 Min (if diff from Tx Min) Procedure, Rationale, Specifics   15  05561 Therapeutic Exercise (timed):  increase ROM, strength, coordination, balance, and proprioception to improve patient's ability to progress to PLOF and address remaining functional goals. (see flow sheet as applicable)     Details if applicable:       15  75551 Neuromuscular Re-Education (timed):  improve balance, coordination, kinesthetic sense, posture, core stability and proprioception to improve patient's ability to develop conscious control of individual muscles and awareness of position of extremities in order to progress to PLOF and address remaining functional goals. (see flow sheet as applicable)     Details if applicable:     17  85511 Therapeutic Activity (timed):  use of dynamic activities replicating functional movements to increase ROM, strength, coordination, balance, and proprioception in order to improve patient's ability to progress to PLOF and address remaining functional goals.   (see flow sheet as applicable)     Details if applicable:            Details if applicable:            Details if applicable:     52  [facility] Totals Reminder: bill using total billable min of TIMED therapeutic procedures (example: do not include dry needle or estim unattended, both untimed codes, in totals to left)  8-22 min = 1 unit; 23-37 min = 2 units; 38-52 min = 3 units;

## 2023-12-12 ENCOUNTER — OFFICE VISIT (OUTPATIENT)
Age: 78
End: 2023-12-12
Payer: MEDICARE

## 2023-12-12 VITALS — BODY MASS INDEX: 41.75 KG/M2 | WEIGHT: 315 LBS | HEIGHT: 73 IN

## 2023-12-12 DIAGNOSIS — Z96.641 PRESENCE OF RIGHT ARTIFICIAL HIP JOINT: Primary | ICD-10-CM

## 2023-12-12 DIAGNOSIS — E66.01 OBESITY, MORBID (HCC): ICD-10-CM

## 2023-12-12 DIAGNOSIS — M48.062 SPINAL STENOSIS, LUMBAR REGION, WITH NEUROGENIC CLAUDICATION: ICD-10-CM

## 2023-12-12 PROCEDURE — G8428 CUR MEDS NOT DOCUMENT: HCPCS | Performed by: ORTHOPAEDIC SURGERY

## 2023-12-12 PROCEDURE — 1123F ACP DISCUSS/DSCN MKR DOCD: CPT | Performed by: ORTHOPAEDIC SURGERY

## 2023-12-12 PROCEDURE — 73552 X-RAY EXAM OF FEMUR 2/>: CPT | Performed by: ORTHOPAEDIC SURGERY

## 2023-12-12 PROCEDURE — G8484 FLU IMMUNIZE NO ADMIN: HCPCS | Performed by: ORTHOPAEDIC SURGERY

## 2023-12-12 PROCEDURE — G8417 CALC BMI ABV UP PARAM F/U: HCPCS | Performed by: ORTHOPAEDIC SURGERY

## 2023-12-12 PROCEDURE — 1036F TOBACCO NON-USER: CPT | Performed by: ORTHOPAEDIC SURGERY

## 2023-12-12 PROCEDURE — 99213 OFFICE O/P EST LOW 20 MIN: CPT | Performed by: ORTHOPAEDIC SURGERY

## 2023-12-13 ENCOUNTER — HOSPITAL ENCOUNTER (OUTPATIENT)
Facility: HOSPITAL | Age: 78
Setting detail: RECURRING SERIES
Discharge: HOME | End: 2023-12-16
Payer: MEDICARE

## 2023-12-13 PROCEDURE — 97530 THERAPEUTIC ACTIVITIES: CPT

## 2023-12-13 PROCEDURE — 97110 THERAPEUTIC EXERCISES: CPT

## 2023-12-13 PROCEDURE — 97112 NEUROMUSCULAR REEDUCATION: CPT

## 2023-12-13 NOTE — PROGRESS NOTES
PHYSICAL / OCCUPATIONAL THERAPY - DAILY TREATMENT NOTE (updated )    Patient Name: Osmar Glez    Date: 2023    : 1945  Insurance: Payor: MEDICARE / Plan: MEDICARE PART A AND B / Product Type: *No Product type* /      Patient  verified Yes     Visit #   Current / Total 6 16   Time   In / Out 9:30 10:18   Pain   In / Out 0 0   Subjective Functional Status/Changes: \"Im feeling good. \"     TREATMENT AREA =  Pain in right hip [M25.551]  Abnormality of gait [R26.9]    OBJECTIVE         Therapeutic Procedures: Tx Min Billable or 1:1 Min (if diff from Tx Min) Procedure, Rationale, Specifics   18  82767 Therapeutic Exercise (timed):  increase ROM, strength, coordination, balance, and proprioception to improve patient's ability to progress to PLOF and address remaining functional goals. (see flow sheet as applicable)     Details if applicable:       15  41077 Therapeutic Activity (timed):  use of dynamic activities replicating functional movements to increase ROM, strength, coordination, balance, and proprioception in order to improve patient's ability to progress to PLOF and address remaining functional goals. (see flow sheet as applicable)     Details if applicable:            Details if applicable:     15       [id number] Neuromuscular Re-Education (timed):  improve balance, coordination, kinesthetic sense, posture, core stability and proprioception to improve patient's ability to develop conscious control of individual muscles and awareness of position of extremities in order to progress to PLOF and address remaining functional goals.  (see flow sheet as applicable)    tails if applicable:            Details if applicable:     50  [facility] Totals Reminder: bill using total billable min of TIMED therapeutic procedures (example: do not include dry needle or estim unattended, both untimed codes, in totals to left)  8-22 min = 1 unit; 23-37 min = 2 units; 38-52 min = 3 units; 53-67 min = 4 units; 68-82

## 2023-12-29 ENCOUNTER — HOSPITAL ENCOUNTER (OUTPATIENT)
Facility: HOSPITAL | Age: 78
Setting detail: RECURRING SERIES
Discharge: HOME | End: 2024-01-01
Payer: MEDICARE

## 2023-12-29 PROCEDURE — 97110 THERAPEUTIC EXERCISES: CPT

## 2023-12-29 NOTE — PROGRESS NOTES
PHYSICAL / OCCUPATIONAL THERAPY - DAILY TREATMENT NOTE (updated )    Patient Name: Neil Hong    Date: 2023    : 1945  Insurance: Payor: MEDICARE / Plan: MEDICARE PART A AND B / Product Type: *No Product type* /      Patient  verified Yes     Visit #   Current / Total 9 16   Time   In / Out 852 940   Pain   In / Out 0 0   Subjective Functional Status/Changes: Patient complains of no R hip pain today       TREATMENT AREA =  Pain in right hip [M25.551]  Abnormality of gait [R26.9]    OBJECTIVE        Therapeutic Procedures:    Tx Min Billable or 1:1 Min (if diff from Tx Min) Procedure, Rationale, Specifics   48  20686 Therapeutic Exercise (timed):  increase ROM, strength, coordination, balance, and proprioception to improve patient's ability to progress to PLOF and address remaining functional goals. (see flow sheet as applicable)     Details if applicable:              Details if applicable:            Details if applicable:            Details if applicable:            Details if applicable:     48  [facility] Totals Reminder: bill using total billable min of TIMED therapeutic procedures (example: do not include dry needle or estim unattended, both untimed codes, in totals to left)  8-22 min = 1 unit; 23-37 min = 2 units; 38-52 min = 3 units; 53-67 min = 4 units; 68-82 min = 5 units   Total Total     [x]  Patient Education billed concurrently with other procedures   [x] Review HEP    [] Progressed/Changed HEP, detail:    [] Other detail:       Objective Information/Functional Measures/Assessment  Pt progressed to  Level  7.5 on Bike per therapy flow sheet today .patient reports that he is doing his HEP everyday.  Patient needs Vcs for proper technique for exercises as mentioned in therapy  flow sheet . Patient Tolerated PT Tx  today without any Fatigue and without any R hip Pain.      Patient will continue to benefit from skilled PT / OT services to modify and progress therapeutic interventions,

## 2024-01-02 ENCOUNTER — HOSPITAL ENCOUNTER (OUTPATIENT)
Facility: HOSPITAL | Age: 79
Setting detail: RECURRING SERIES
Discharge: HOME | End: 2024-01-05
Payer: MEDICARE

## 2024-01-02 PROCEDURE — 97530 THERAPEUTIC ACTIVITIES: CPT

## 2024-01-02 PROCEDURE — 97112 NEUROMUSCULAR REEDUCATION: CPT

## 2024-01-02 PROCEDURE — 97110 THERAPEUTIC EXERCISES: CPT

## 2024-01-02 NOTE — PROGRESS NOTES
PHYSICAL / OCCUPATIONAL THERAPY - DAILY TREATMENT NOTE (updated )    Patient Name: Neil Hong    Date: 2024    : 1945  Insurance: Payor: MEDICARE / Plan: MEDICARE PART A AND B / Product Type: *No Product type* /      Patient  verified Yes     Visit #   Current / Total 10 16   Time   In / Out 920 1015   Pain   In / Out 0 0   Subjective Functional Status/Changes: I am a little tired.     TREATMENT AREA =  Pain in right hip [M25.551]  Abnormality of gait [R26.9]    OBJECTIVE         Therapeutic Procedures:    Tx Min Billable or 1:1 Min (if diff from Tx Min) Procedure, Rationale, Specifics    69478 Therapeutic Exercise (timed):  increase ROM, strength, coordination, balance, and proprioception to improve patient's ability to progress to PLOF and address remaining functional goals. (see flow sheet as applicable)     Details if applicable:       112 Neuromuscular Re-Education (timed):  improve balance, coordination, kinesthetic sense, posture, core stability and proprioception to improve patient's ability to develop conscious control of individual muscles and awareness of position of extremities in order to progress to PLOF and address remaining functional goals. (see flow sheet as applicable)     Details if applicable:      93283 Therapeutic Activity (timed):  use of dynamic activities replicating functional movements to increase ROM, strength, coordination, balance, and proprioception in order to improve patient's ability to progress to PLOF and address remaining functional goals.  (see flow sheet as applicable)     Details if applicable:            Details if applicable:           Details if applicable:     55 55 [facility] Totals Reminder: bill using total billable min of TIMED therapeutic procedures (example: do not include dry needle or estim unattended, both untimed codes, in totals to left)  8-22 min = 1 unit; 23-37 min = 2 units; 38-52 min = 3 units; 53-67 min = 4 units; 68-82

## 2024-01-04 ENCOUNTER — APPOINTMENT (OUTPATIENT)
Facility: HOSPITAL | Age: 79
End: 2024-01-04
Payer: MEDICARE

## 2024-01-05 ENCOUNTER — HOSPITAL ENCOUNTER (OUTPATIENT)
Facility: HOSPITAL | Age: 79
Setting detail: RECURRING SERIES
Discharge: HOME | End: 2024-01-08
Payer: MEDICARE

## 2024-01-05 PROCEDURE — 97112 NEUROMUSCULAR REEDUCATION: CPT

## 2024-01-05 PROCEDURE — 97110 THERAPEUTIC EXERCISES: CPT

## 2024-01-05 PROCEDURE — 97530 THERAPEUTIC ACTIVITIES: CPT

## 2024-01-05 NOTE — PROGRESS NOTES
PHYSICAL / OCCUPATIONAL THERAPY - DAILY TREATMENT NOTE    Patient Name: Neil Hong    Date: 2024    : 1945  Insurance: Payor: MEDICARE / Plan: MEDICARE PART A AND B / Product Type: *No Product type* /      Patient  verified Yes     Visit #   Current / Total 11 16   Time   In / Out 130 225   Pain   In / Out 0 0   Subjective Functional Status/Changes: I want to try and work the balance and get the right leg stronger and better with stance on it when walking, foot placement when walking.     TREATMENT AREA =  Pain in right hip [M25.551]  Abnormality of gait [R26.9]    OBJECTIVE         Therapeutic Procedures:    Tx Min Billable or 1:1 Min (if diff from Tx Min) Procedure, Rationale, Specifics   20  98666 Therapeutic Exercise (timed):  increase ROM, strength, coordination, balance, and proprioception to improve patient's ability to progress to PLOF and address remaining functional goals. (see flow sheet as applicable)     Details if applicable:        44834 Neuromuscular Re-Education (timed):  improve balance, coordination, kinesthetic sense, posture, core stability and proprioception to improve patient's ability to develop conscious control of individual muscles and awareness of position of extremities in order to progress to PLOF and address remaining functional goals. (see flow sheet as applicable)     Details if applicable:      89156 Therapeutic Activity (timed):  use of dynamic activities replicating functional movements to increase ROM, strength, coordination, balance, and proprioception in order to improve patient's ability to progress to PLOF and address remaining functional goals.  (see flow sheet as applicable)     Details if applicable:            Details if applicable:            Details if applicable:     55 55 [facility] Totals Reminder: bill using total billable min of TIMED therapeutic procedures (example: do not include dry needle or estim unattended, both untimed codes, in

## 2024-01-09 ENCOUNTER — HOSPITAL ENCOUNTER (OUTPATIENT)
Facility: HOSPITAL | Age: 79
Setting detail: RECURRING SERIES
Discharge: HOME | End: 2024-01-12
Payer: MEDICARE

## 2024-01-09 PROCEDURE — 97110 THERAPEUTIC EXERCISES: CPT

## 2024-01-09 PROCEDURE — 97112 NEUROMUSCULAR REEDUCATION: CPT

## 2024-01-09 PROCEDURE — 97530 THERAPEUTIC ACTIVITIES: CPT

## 2024-01-09 NOTE — PROGRESS NOTES
units; 53-67 min = 4 units; 68-82 min = 5 units   Total Total     [x]  Patient Education billed concurrently with other procedures   [x] Review HEP    [] Progressed/Changed HEP, detail:    [] Other detail:       Objective Information/Functional Measures/Assessment    Patient continues to present to PT with no reports of right hip pain. Patient reports improvements with right hip strength and balance. Patient performed exercises to further assist with increasing right glute med strength for ambulation and stair negotiation. Progressed weight and reps of DL and SL leg press and added Airex to 1/2 stance with BOSU ball. Increased muscle fatigue was reported with SL leg press. Will continue to progress patient as tolerated and able.     Patient will continue to benefit from skilled PT / OT services to modify and progress therapeutic interventions, analyze and address functional mobility deficits, analyze and address ROM deficits, analyze and address strength deficits, analyze and address soft tissue restrictions, analyze and cue for proper movement patterns, analyze and modify for postural abnormalities, and instruct in home and community integration to address functional deficits and attain remaining goals.    Progress toward goals / Updated goals:  []  See Progress Note/Recertification    1  patient will have SL Abduction right LE 4,4+ to aid with increase stability for ambulation with less [place]burg and increase I to ambulate with no AD for community activities  recert MMT 3+ 11/9/23   3+, 4- 11/22/23  CURRENT NA 1/5/24     2 patient will ambulated 6 minutes level surface in clinic with no AD use and no LOB for carryover to community activities and activities at home  PN  11/2/2023 ambulation without AD x 30' shows compensated trendelenburg. Added walking high knee margarita and captain banegas today to address goal. Goal in progress.    CURRENT NA 1/5/24    Next PN/ RC due 1/23/2024  Auth due BALJIT     PLAN  -

## 2024-01-11 ENCOUNTER — HOSPITAL ENCOUNTER (OUTPATIENT)
Facility: HOSPITAL | Age: 79
Setting detail: RECURRING SERIES
Discharge: HOME | End: 2024-01-14
Payer: MEDICARE

## 2024-01-11 PROCEDURE — 97112 NEUROMUSCULAR REEDUCATION: CPT

## 2024-01-11 PROCEDURE — 97110 THERAPEUTIC EXERCISES: CPT

## 2024-01-11 PROCEDURE — 97530 THERAPEUTIC ACTIVITIES: CPT

## 2024-01-11 NOTE — PROGRESS NOTES
PHYSICAL / OCCUPATIONAL THERAPY - DAILY TREATMENT NOTE    Patient Name: Neil Hong    Date: 2024    : 1945  Insurance: Payor: MEDICARE / Plan: MEDICARE PART A AND B / Product Type: *No Product type* /      Patient  verified Yes     Visit #   Current / Total 13 16   Time   In / Out 930 1020   Pain   In / Out 0 0   Subjective Functional Status/Changes: I am doing pretty.     TREATMENT AREA =  Pain in right hip [M25.551]  Abnormality of gait [R26.9]    OBJECTIVE         Therapeutic Procedures:    Tx Min Billable or 1:1 Min (if diff from Tx Min) Procedure, Rationale, Specifics   18  09775 Therapeutic Exercise (timed):  increase ROM, strength, coordination, balance, and proprioception to improve patient's ability to progress to PLOF and address remaining functional goals. (see flow sheet as applicable)     Details if applicable:        88216 Neuromuscular Re-Education (timed):  improve balance, coordination, kinesthetic sense, posture, core stability and proprioception to improve patient's ability to develop conscious control of individual muscles and awareness of position of extremities in order to progress to PLOF and address remaining functional goals. (see flow sheet as applicable)     Details if applicable:      57894 Therapeutic Activity (timed):  use of dynamic activities replicating functional movements to increase ROM, strength, coordination, balance, and proprioception in order to improve patient's ability to progress to PLOF and address remaining functional goals.  (see flow sheet as applicable)     Details if applicable:            Details if applicable:            Details if applicable:     50 50 [facility] Totals Reminder: bill using total billable min of TIMED therapeutic procedures (example: do not include dry needle or estim unattended, both untimed codes, in totals to left)  8-22 min = 1 unit; 23-37 min = 2 units; 38-52 min = 3 units; 53-67 min = 4 units; 68-82 min = 5 units

## 2024-01-16 ENCOUNTER — HOSPITAL ENCOUNTER (OUTPATIENT)
Facility: HOSPITAL | Age: 79
Setting detail: RECURRING SERIES
Discharge: HOME | End: 2024-01-19
Payer: MEDICARE

## 2024-01-16 PROCEDURE — 97110 THERAPEUTIC EXERCISES: CPT

## 2024-01-16 PROCEDURE — 97530 THERAPEUTIC ACTIVITIES: CPT

## 2024-01-16 PROCEDURE — 97112 NEUROMUSCULAR REEDUCATION: CPT

## 2024-01-16 NOTE — PROGRESS NOTES
PHYSICAL / OCCUPATIONAL THERAPY - DAILY TREATMENT NOTE    Patient Name: Neil Hong    Date: 2024    : 1945  Insurance: Payor: MEDICARE / Plan: MEDICARE PART A AND B / Product Type: *No Product type* /      Patient  verified Yes     Visit #   Current / Total 14 16   Time   In / Out 928 1013   Pain   In / Out 0 0   Subjective Functional Status/Changes: \"I walked 6 blocks uphill yesterday in [place], hips were fine, just the lungs bothered me\"      TREATMENT AREA =  Pain in right hip [M25.551]  Abnormality of gait [R26.9]  OBJECTIVE  Therapeutic Procedures:  Tx Min Billable or 1:1 Min (if diff from Tx Min) Procedure, Rationale, Specifics   15 15 16358 Therapeutic Exercise (timed):  increase ROM, strength, coordination, balance, and proprioception to improve patient's ability to progress to PLOF and address remaining functional goals. (see flow sheet as applicable)     Details if applicable:       15 15 33544 Neuromuscular Re-Education (timed):  improve balance, coordination, kinesthetic sense, posture, core stability and proprioception to improve patient's ability to develop conscious control of individual muscles and awareness of position of extremities in order to progress to PLOF and address remaining functional goals. (see flow sheet as applicable)     Details if applicable:     15 15 17453 Therapeutic Activity (timed):  use of dynamic activities replicating functional movements to increase ROM, strength, coordination, balance, and proprioception in order to improve patient's ability to progress to PLOF and address remaining functional goals.  (see flow sheet as applicable)     Details if applicable:     45 45 [facility] Totals Reminder: bill using total billable min of TIMED therapeutic procedures (example: do not include dry needle or estim unattended, both untimed codes, in totals to left)  8-22 min = 1 unit; 23-37 min = 2 units; 38-52 min = 3 units; 53-67 min = 4 units; 68-82 min = 5 units

## 2024-01-16 NOTE — PROGRESS NOTES
[facility]  [address], [address], [place] [address]  Ph:[phone] Fx:[phone]  PHYSICAL THERAPY PROGRESS NOTE  Patient Name: Neil Hong : 1945   Treatment/Medical Diagnosis: Pain in right hip [M25.551]  Abnormality of gait [R26.9]   Referral Source: Ac Chandler DO     Date of Initial Visit: 23 Attended Visits: 30 Missed Visits: 0 this period      SUMMARY OF TREATMENT  Patient seen for eval and 30 follow up sessions focusing on right hip strength, functional mobility and gait training.    CURRENT STATUS  Patient reports \"I need to work on my balance \"     1  patient will have SL Abduction right LE 4,4+ to aid with increase stability for ambulation with less [place]burg and increase I to ambulate with no AD for community activities  recert MMT 3+ 23   3+, 4- 23  CURRENT PN: 24 4/5, progressing.      2 patient will ambulated 6 minutes level surface in clinic with no AD use and no LOB for carryover to community activities and activities at home  PN  2023 ambulation without AD x 30' shows compensated trendelenburg. Added walking high knee marches and captain royals today to address goal. Goal in progress.    PN 24 Pt amb 4.5  minutes with no AD and no LOB, limited by RIVERO, fatigue, elevated HR (110 bpm, pulse ox 100%, dyspnea), progressing to 620 feet today.      3 NEW Pt will ascend and descend 12 steps with no cane, R HR and reciprocal gait pattern.   PN 24 pt ascends 8 inch steps with R HR, reciprocal pattern, 16 steps, with compensation and decreased eccentric control LLE.         Functional Gains: the strength of the muscle set we identified is much improved, less reliance on the cane use, walking more and better.  Functional Deficits: still feel handicapped by the poor muscle memory I got when I was so weak- trying to break that habit. My spine arthritis aggravates my involvement

## 2024-01-18 ENCOUNTER — APPOINTMENT (OUTPATIENT)
Facility: HOSPITAL | Age: 79
End: 2024-01-18
Payer: MEDICARE

## 2024-01-26 ENCOUNTER — HOSPITAL ENCOUNTER (OUTPATIENT)
Facility: HOSPITAL | Age: 79
Setting detail: RECURRING SERIES
Discharge: HOME | End: 2024-01-29
Payer: MEDICARE

## 2024-01-26 PROCEDURE — 97110 THERAPEUTIC EXERCISES: CPT

## 2024-01-26 PROCEDURE — 97530 THERAPEUTIC ACTIVITIES: CPT

## 2024-01-26 PROCEDURE — 97112 NEUROMUSCULAR REEDUCATION: CPT

## 2024-01-26 NOTE — PROGRESS NOTES
PHYSICAL / OCCUPATIONAL THERAPY - DAILY TREATMENT NOTE    Patient Name: Neil Hong    Date: 2024    : 1945  Insurance: Payor: MEDICARE / Plan: MEDICARE PART A AND B / Product Type: *No Product type* /      Patient  verified Yes     Visit #   Current / Total 1 8   Time   In / Out 850 943   Pain   In / Out 0 0   Subjective Functional Status/Changes: Doing alright today.      TREATMENT AREA =  Pain in right hip [M25.551]  Abnormality of gait [R26.9]    OBJECTIVE         Therapeutic Procedures:    Tx Min Billable or 1:1 Min (if diff from Tx Min) Procedure, Rationale, Specifics   15 15 63887 Therapeutic Exercise (timed):  increase ROM, strength, coordination, balance, and proprioception to improve patient's ability to progress to PLOF and address remaining functional goals. (see flow sheet as applicable)     Details if applicable:        Neuromuscular Re-Education (timed):  improve balance, coordination, kinesthetic sense, posture, core stability and proprioception to improve patient's ability to develop conscious control of individual muscles and awareness of position of extremities in order to progress to PLOF and address remaining functional goals. (see flow sheet as applicable)     Details if applicable:     15 15 59565 Therapeutic Activity (timed):  use of dynamic activities replicating functional movements to increase ROM, strength, coordination, balance, and proprioception in order to improve patient's ability to progress to PLOF and address remaining functional goals.  (see flow sheet as applicable)     Details if applicable:            Details if applicable:            Details if applicable:     53 53 [facility] Totals Reminder: bill using total billable min of TIMED therapeutic procedures (example: do not include dry needle or estim unattended, both untimed codes, in totals to left)  8-22 min = 1 unit; 23-37 min = 2 units; 38-52 min = 3 units; 53-67 min = 4 units; 68-82 min = 5 units

## 2024-01-30 ENCOUNTER — HOSPITAL ENCOUNTER (OUTPATIENT)
Facility: HOSPITAL | Age: 79
Setting detail: RECURRING SERIES
Discharge: HOME | End: 2024-02-02
Payer: MEDICARE

## 2024-01-30 PROCEDURE — 97530 THERAPEUTIC ACTIVITIES: CPT

## 2024-01-30 PROCEDURE — 97110 THERAPEUTIC EXERCISES: CPT

## 2024-01-30 PROCEDURE — 97112 NEUROMUSCULAR REEDUCATION: CPT

## 2024-01-30 NOTE — PROGRESS NOTES
PHYSICAL / OCCUPATIONAL THERAPY - DAILY TREATMENT NOTE    Patient Name: Neil Hong    Date: 2024    : 1945  Insurance: Payor: MEDICARE / Plan: MEDICARE PART A AND B / Product Type: *No Product type* /      Patient  verified Yes     Visit #   Current / Total 2 8   Time   In / Out 1006 1100   Pain   In / Out 0 0   Subjective Functional Status/Changes: Doing alright today     TREATMENT AREA =  Pain in right hip [M25.551]  Abnormality of gait [R26.9]    OBJECTIVE         Therapeutic Procedures:    Tx Min Billable or 1:1 Min (if diff from Tx Min) Procedure, Rationale, Specifics   10 10 55164 Therapeutic Exercise (timed):  increase ROM, strength, coordination, balance, and proprioception to improve patient's ability to progress to PLOF and address remaining functional goals. (see flow sheet as applicable)     Details if applicable:        65440 Neuromuscular Re-Education (timed):  improve balance, coordination, kinesthetic sense, posture, core stability and proprioception to improve patient's ability to develop conscious control of individual muscles and awareness of position of extremities in order to progress to PLOF and address remaining functional goals. (see flow sheet as applicable)     Details if applicable:      90821 Therapeutic Activity (timed):  use of dynamic activities replicating functional movements to increase ROM, strength, coordination, balance, and proprioception in order to improve patient's ability to progress to PLOF and address remaining functional goals.  (see flow sheet as applicable)     Details if applicable:            Details if applicable:            Details if applicable:     54 54 [facility] Totals Reminder: bill using total billable min of TIMED therapeutic procedures (example: do not include dry needle or estim unattended, both untimed codes, in totals to left)  8-22 min = 1 unit; 23-37 min = 2 units; 38-52 min = 3 units; 53-67 min = 4 units; 68-82 min = 5 units

## 2024-02-01 ENCOUNTER — HOSPITAL ENCOUNTER (OUTPATIENT)
Facility: HOSPITAL | Age: 79
Setting detail: RECURRING SERIES
Discharge: HOME | End: 2024-02-04
Payer: MEDICARE

## 2024-02-01 PROCEDURE — 97112 NEUROMUSCULAR REEDUCATION: CPT

## 2024-02-01 PROCEDURE — 97110 THERAPEUTIC EXERCISES: CPT

## 2024-02-01 NOTE — PROGRESS NOTES
PHYSICAL / OCCUPATIONAL THERAPY - DAILY TREATMENT NOTE    Patient Name: Neil Hong    Date: 2024    : 1945  Insurance: Payor: MEDICARE / Plan: MEDICARE PART A AND B / Product Type: *No Product type* /      Patient  verified Yes     Visit #   Current / Total 3 8   Time   In / Out 9:58 10:51   Pain   In / Out 0 0   Subjective Functional Status/Changes: \"Is this part of the [employer] curriculum?\"     TREATMENT AREA =  Pain in right hip [M25.551]  Abnormality of gait [R26.9]  OBJECTIVE     Therapeutic Procedures:  Tx Min Billable or 1:1 Min (if diff from Tx Min) Procedure, Rationale, Specifics   30 30 69325 Therapeutic Exercise (timed):  increase ROM, strength, coordination, balance, and proprioception to improve patient's ability to progress to PLOF and address remaining functional goals. (see flow sheet as applicable)     Details if applicable:  pt with minimal cueing for all exercises, receptive to 50% of PT cues, tends to hold his breath, mod cues for no breath hold throughout session.       23 23 86489 Neuromuscular Re-Education (timed):  improve balance, coordination, kinesthetic sense, posture, core stability and proprioception to improve patient's ability to develop conscious control of individual muscles and awareness of position of extremities in order to progress to PLOF and address remaining functional goals. (see flow sheet as applicable)     Details if applicable:     53 53 [facility] Totals Reminder: bill using total billable min of TIMED therapeutic procedures (example: do not include dry needle or estim unattended, both untimed codes, in totals to left)  8-22 min = 1 unit; 23-37 min = 2 units; 38-52 min = 3 units; 53-67 min = 4 units; 68-82 min = 5 units   Total Total     [x]  Patient Education billed concurrently with other procedures   [x] Review HEP    [] Progressed/Changed HEP, detail:    [] Other detail:     Objective Information/Functional Measures/Assessment    VC exercises

## 2024-02-06 ENCOUNTER — HOSPITAL ENCOUNTER (OUTPATIENT)
Facility: HOSPITAL | Age: 79
Setting detail: RECURRING SERIES
Discharge: HOME | End: 2024-02-09
Payer: MEDICARE

## 2024-02-06 PROCEDURE — 97112 NEUROMUSCULAR REEDUCATION: CPT

## 2024-02-06 PROCEDURE — 97110 THERAPEUTIC EXERCISES: CPT

## 2024-02-06 PROCEDURE — 97530 THERAPEUTIC ACTIVITIES: CPT

## 2024-02-06 NOTE — PROGRESS NOTES
progressing.   Current:      3  Pt will ascend and descend 12 steps with no cane, R HR and reciprocal gait pattern.   Re cert: 1/16/24 pt ascends 8 inch steps with R HR, reciprocal pattern, 16 steps, with compensation and decreased eccentric control LLE.     Current: 8\" stairs 5x reciprocal leading with the right LE f/b 5x leading with the left LE no LOB 1-2 rails light touch     Next PN/ RC due 2/16/2024  Auth due (visit number/ date) [facility]    PLAN  - Continue Plan of Care  - Upgrade activities as tolerated    Svetlana Moon PTA    2/6/2024    8:00 AM    Future Appointments   Date Time Provider Department Center   2/6/2024 10:10 AM Svetlana Moon PTA [facility]   2/8/2024 10:50 AM Joann Mcgarry, PT [facility]   2/13/2024 10:10 AM Svetlana Moon, PTA [facility]   2/15/2024 10:10 AM Amee Felix, PT [facility]   2/20/2024 10:10 AM Svetlana Moon, PTA [facility]   2/22/2024 10:10 AM Karen Phillips, PTA [facility]   6/17/2024  8:30 AM Ac Chandler, DO [facility]

## 2024-02-08 ENCOUNTER — HOSPITAL ENCOUNTER (OUTPATIENT)
Facility: HOSPITAL | Age: 79
Setting detail: RECURRING SERIES
Discharge: HOME | End: 2024-02-11
Payer: MEDICARE

## 2024-02-08 PROCEDURE — 97530 THERAPEUTIC ACTIVITIES: CPT

## 2024-02-08 PROCEDURE — 97112 NEUROMUSCULAR REEDUCATION: CPT

## 2024-02-08 PROCEDURE — 97110 THERAPEUTIC EXERCISES: CPT

## 2024-02-08 NOTE — PROGRESS NOTES
PHYSICAL / OCCUPATIONAL THERAPY - DAILY TREATMENT NOTE    Patient Name: Neil Hong    Date: 2024    : 1945  Insurance: Payor: MEDICARE / Plan: MEDICARE PART A AND B / Product Type: *No Product type* /      Patient  verified Yes     Visit #   Current / Total 5 12   Time   In / Out 1045 1140   Pain   In / Out 0 0   Subjective Functional Status/Changes: I am not having any pain.      TREATMENT AREA =  Pain in right hip [M25.551]  Abnormality of gait [R26.9]    OBJECTIVE         Therapeutic Procedures:    Tx Min Billable or 1:1 Min (if diff from Tx Min) Procedure, Rationale, Specifics   15 15 02677 Therapeutic Exercise (timed):  increase ROM, strength, coordination, balance, and proprioception to improve patient's ability to progress to PLOF and address remaining functional goals. (see flow sheet as applicable)     Details if applicable:        Neuromuscular Re-Education (timed):  improve balance, coordination, kinesthetic sense, posture, core stability and proprioception to improve patient's ability to develop conscious control of individual muscles and awareness of position of extremities in order to progress to PLOF and address remaining functional goals. (see flow sheet as applicable)     Details if applicable:     15 15 61353 Therapeutic Activity (timed):  use of dynamic activities replicating functional movements to increase ROM, strength, coordination, balance, and proprioception in order to improve patient's ability to progress to PLOF and address remaining functional goals.  (see flow sheet as applicable)     Details if applicable:            Details if applicable:            Details if applicable:     55 55 [facility] Totals Reminder: bill using total billable min of TIMED therapeutic procedures (example: do not include dry needle or estim unattended, both untimed codes, in totals to left)  8-22 min = 1 unit; 23-37 min = 2 units; 38-52 min = 3 units; 53-67 min = 4 units; 68-82 min = 5

## 2024-02-13 ENCOUNTER — HOSPITAL ENCOUNTER (OUTPATIENT)
Facility: HOSPITAL | Age: 79
Setting detail: RECURRING SERIES
Discharge: HOME | End: 2024-02-16
Payer: MEDICARE

## 2024-02-13 PROCEDURE — 97110 THERAPEUTIC EXERCISES: CPT

## 2024-02-13 PROCEDURE — 97530 THERAPEUTIC ACTIVITIES: CPT

## 2024-02-13 PROCEDURE — 97112 NEUROMUSCULAR REEDUCATION: CPT

## 2024-02-13 NOTE — PROGRESS NOTES
PHYSICAL / OCCUPATIONAL THERAPY - DAILY TREATMENT NOTE    Patient Name: Neil Hong    Date: 2024    : 1945  Insurance: Payor: MEDICARE / Plan: MEDICARE PART A AND B / Product Type: *No Product type* /      Patient  verified Yes     Visit #   Current / Total 6 12   Time   In / Out 1007 am 1051 am    Pain   In / Out 0/10  0/10    Subjective Functional Status/Changes: Patient reports no pain today.      TREATMENT AREA =  Pain in right hip [M25.551]  Abnormality of gait [R26.9]    OBJECTIVE         Therapeutic Procedures:    Tx Min Billable or 1:1 Min (if diff from Tx Min) Procedure, Rationale, Specifics   10  22781 Therapeutic Exercise (timed):  increase ROM, strength, coordination, balance, and proprioception to improve patient's ability to progress to PLOF and address remaining functional goals. (see flow sheet as applicable)     Details if applicable:       19  39105 Neuromuscular Re-Education (timed):  improve balance, coordination, kinesthetic sense, posture, core stability and proprioception to improve patient's ability to develop conscious control of individual muscles and awareness of position of extremities in order to progress to PLOF and address remaining functional goals. (see flow sheet as applicable)     Details if applicable:     15  63580 Therapeutic Activity (timed):  use of dynamic activities replicating functional movements to increase ROM, strength, coordination, balance, and proprioception in order to improve patient's ability to progress to PLOF and address remaining functional goals.  (see flow sheet as applicable)     Details if applicable:            Details if applicable:            Details if applicable:     44  [facility] Totals Reminder: bill using total billable min of TIMED therapeutic procedures (example: do not include dry needle or estim unattended, both untimed codes, in totals to left)  8-22 min = 1 unit; 23-37 min = 2 units; 38-52 min = 3 units; 53-67 min = 4 units;

## 2024-02-14 NOTE — PROGRESS NOTES
PT DAILY TREATMENT NOTE     Patient Name: Vivi Alvarado  Date:2022  : 1945  [x]  Patient  Verified  [place] / Plan: [place] & [place] / Product Type: Medicare /    In time:9:48  Out time:10:38  Total Treatment Time (min): 50  Visit #: 2 of 8    Medicare/BCBS Only   Total Timed Codes (min):  40 1:1 Treatment Time:  40       Treatment Area: Right hip pain [M25.551]    SUBJECTIVE  Pain Level (0-10 scale): 0/10  Any medication changes, allergies to medications, adverse drug reactions, diagnosis change, or new procedure performed?: [x] No    [] Yes (see summary sheet for update)  Subjective functional status/changes:   [] No changes reported  Pt denies hip pain currently. OBJECTIVE    Modality rationale: decrease inflammation and decrease pain to improve the patients ability to perform ADLs with increased ease.     Min Type Additional Details    [] Estim:  []Unatt       []IFC  []Premod                        []Other:  []w/ice   []w/heat  Position:  Location:    [] Estim: []Att    []TENS instruct  []NMES                    []Other:  []w/US   []w/ice   []w/heat  Position:  Location:    []  Traction: [] Cervical       []Lumbar                       [] Prone          []Supine                       []Intermittent   []Continuous Lbs:  [] before manual  [] after manual    []  Ultrasound: []Continuous   [] Pulsed                           []1MHz   []3MHz W/cm2:  Location:    []  Iontophoresis with dexamethasone         Location: [] Take home patch   [] In clinic   10 [x]  Ice     []  heat  []  Ice massage  []  Laser   []  Anodyne Position:left sidelying  Location:right hip    []  Laser with stim  []  Other:  Position:  Location:    []  Vasopneumatic Device    []  Right     []  Left  Pre-treatment girth:  Post-treatment girth:  Measured at (location):  Pressure:       [] lo [] med [] hi   Temperature: [] lo [] med [] hi   [] Skin assessment post-treatment:  []intact []redness- no adverse Detail Level: Zone reaction    []redness - adverse reaction:       22 min Therapeutic Exercise:  [x] See flow sheet :   Rationale: increase ROM and increase strength to improve the patients ability to perform ADLs with increased ease. 10 min Neuromuscular Re-education:  [x]  See flow sheet :   Rationale: increase strength, improve coordination and increase proprioception  to improve the patients ability to perform functional activities with increased ease. 8 min Gait Trainin feet each with SPC device on level surfaces with SBA level of assist: marching, retro and side stepping. Rationale: With   [] TE   [] TA   [] neuro   [] other: Patient Education: [x] Review HEP    [] Progressed/Changed HEP based on:   [] positioning   [] body mechanics   [] transfers   [] heat/ice application    [] other:      Other Objective/Functional Measures: Fair gait mechanics with use of SPC. Pain Level (0-10 scale) post treatment: 0/10    ASSESSMENT/Changes in Function: Pt continues to require SPC to ambulate within treatment area and during dynamic gait activities. Patient will continue to benefit from skilled PT services to modify and progress therapeutic interventions, address functional mobility deficits, address ROM deficits, address strength deficits, analyze and address soft tissue restrictions, analyze and cue movement patterns, analyze and modify body mechanics/ergonomics and assess and modify postural abnormalities to attain remaining goals. []  See Plan of Care  []  See progress note/recertification  []  See Discharge Summary         Progress towards goals / Updated goals:  1. Pt will improve his left hip ext/abd strength to 3+/5 MMT to improve stability with ambulation.               Re-cert: remains,                 Current: remains, 3/5 (2022)  2. Pt will amb community distances without his cane, demonstrating improved independence and stability.               Re-cert: uses cane, unsteady Plan: Location: Face\\nTreatment: Recommended Yearly PDT\\n\\nPatient is here for PDT follow up and had a great response to treatment. \\nPatient states that there was some redness and peeling, for approximately 3-5 days post blue light treatment. \\nPatient states that the skin has become much more smooth and patient is very pleased with results.\\nDiscussed with patient that blue light therapy is not a \"one and done\" treatment and that they should do one every year in order to slow down the pre cancerous lesions that patient has accumulated over the years due to extensive sun damage. \\nAdvised patient to continue to be very diligent with wearing a good sunscreen that has either zinc oxide or titanium.\\nOverall, patient has shown significant improvement from the blue light therapy treatment. without AD  3.  Pt will improve his FOTO to 61, demonstrating improved functional ADL ease.                Re-cert: progressing, 58    PLAN  []  Upgrade activities as tolerated     [x]  Continue plan of care  []  Update interventions per flow sheet       []  Discharge due to:_  []  Other:_      Stephanie Pedro PTA 7/13/2022  9:59 AM    Future Appointments   Date Time Provider Lena Corrales   7/19/2022 11:15 AM Jared Boyce [facility]   7/21/2022  7:00 AM Yumiko Jimenez, PT [facility]   7/26/2022  9:00 AM Yumiko Jimenez, PT [facility]   7/28/2022  9:15 AM Halma Lapidus, PTA [facility]   8/10/2022  9:45 AM Halmaemily Mcdonough, PTA [facility]   8/12/2022  8:50 AM Kirk Pascual PA-C [facility] Plan: Location: face\\nPrescribed: HQ 4% Tretinoin 0.05% compound cream QHS\\n\\nPt has hyperpigmentation on face today.\\nDiscussed with pt that this pigmentation is due to sun damage to the skin, advised patient to use SPF daily.\\nDiscussed with pt that we will start patient on HQ 4% Tretinoin 0.05% to use nightly.\\nDiscussed with pt to apply a pea size amount to face, pushing into pores. \\nDiscussed with pt to avoid areas around eyes, nasal crease, and around the lips since skin is thin and may get irritated easily.\\n\\nDiscussed with pt that it will come in a box that says keep refrigerated.\\nDiscussed with pt that they do not need to keep it refrigerated, but have to keep it in a dark place since sunlight may oxidize it.\\nDiscussed with pt that if skin becomes irritated while using cream, then pt can change frequency to every other night, or every other other night, etc.\\nDiscussed with pt that results are not immediate and may take up to a month to notice change.\\nAdvised pt to apply a moisturizing cream such as Cerave afterwards to help reestablish a healthy skin barrier.\\nF/u as needed.

## 2024-02-15 ENCOUNTER — HOSPITAL ENCOUNTER (OUTPATIENT)
Facility: HOSPITAL | Age: 79
Setting detail: RECURRING SERIES
Discharge: HOME | End: 2024-02-18
Payer: MEDICARE

## 2024-02-15 PROCEDURE — 97530 THERAPEUTIC ACTIVITIES: CPT

## 2024-02-15 PROCEDURE — 97112 NEUROMUSCULAR REEDUCATION: CPT

## 2024-02-15 PROCEDURE — 97110 THERAPEUTIC EXERCISES: CPT

## 2024-02-15 NOTE — PROGRESS NOTES
[facility]  [address], [address], [place] [address]  Ph:[phone] Fx:[phone]  PHYSICAL THERAPY PROGRESS NOTE  Patient Name: Neil Hong : 1945   Treatment/Medical Diagnosis: Pain in right hip [M25.551]  Abnormality of gait [R26.9]   Referral Source: Ac Chandler DO     Date of Initial Visit: 23 Attended Visits: 37 Missed Visits: 1     SUMMARY OF TREATMENT  Patient continues to be seen to address gait mechanics and functional mobility and muscle imbalance.   Remains with noted decrease R LE strength for SLS when descending stairs   Progressing as able and as expected- challenging himself to work harder on leg press by altering position of seat  CURRENT STATUS     1  patient will have SL Abduction right LE 4,4+ to aid with increase stability for ambulation with less [place] and increase I to ambulate with no AD for community activities  recert MMT 3+ 23   3+, 4- 23  CURRENT: Ongoing 2024      2 patient will ambulated 6 minutes level surface in clinic with no AD use and no LOB for carryover to community activities and activities at home  RECERT: 24 Pt amb 4.5  minutes with no AD and no LOB, limited by RIVERO, fatigue, elevated HR (110 bpm, pulse ox 100%, dyspnea), progressing to 620 feet today.     24 states he ambulates 150' and a flight of steps to go get the mail with no AD/ SPC lately, progressing.   Current: NA 24     3  Pt will ascend and descend 12 steps with no cane, R HR and reciprocal gait pattern.   Re cert: 24 pt ascends 8 inch steps with R HR, reciprocal pattern, 16 steps, with compensation and decreased eccentric control LLE.     Current: 8\" stairs 5x reciprocal leading with the right LE f/b 5x leading with the left LE no LOB 1-2 rails light touch. 24    Functional Gains: overall endurance is improving, exerting myself more, R LE SLS is improving   Functional Deficits:

## 2024-02-15 NOTE — PROGRESS NOTES
PHYSICAL / OCCUPATIONAL THERAPY - DAILY TREATMENT NOTE    Patient Name: Neil Hong    Date: 2/15/2024    : 1945  Insurance: Payor: MEDICARE / Plan: MEDICARE PART A AND B / Product Type: *No Product type* /      Patient  verified Yes     Visit #   Current / Total 7 12   Time   In / Out 1004 1054   Pain   In / Out 0 0   Subjective Functional Status/Changes: I am still having the weakness in the right leg with the stairs and putting all my weight on that side to push up.      TREATMENT AREA =  Pain in right hip [M25.551]  Abnormality of gait [R26.9]    OBJECTIVE         Therapeutic Procedures:    Tx Min Billable or 1:1 Min (if diff from Tx Min) Procedure, Rationale, Specifics   15 15 43139 Therapeutic Exercise (timed):  increase ROM, strength, coordination, balance, and proprioception to improve patient's ability to progress to PLOF and address remaining functional goals. (see flow sheet as applicable)     Details if applicable:       15 15 99682 Neuromuscular Re-Education (timed):  improve balance, coordination, kinesthetic sense, posture, core stability and proprioception to improve patient's ability to develop conscious control of individual muscles and awareness of position of extremities in order to progress to PLOF and address remaining functional goals. (see flow sheet as applicable)     Details if applicable:     530 Therapeutic Activity (timed):  use of dynamic activities replicating functional movements to increase ROM, strength, coordination, balance, and proprioception in order to improve patient's ability to progress to PLOF and address remaining functional goals.  (see flow sheet as applicable)     Details if applicable:            Details if applicable:            Details if applicable:     50 50 [facility] Totals Reminder: bill using total billable min of TIMED therapeutic procedures (example: do not include dry needle or estim unattended, both untimed codes, in totals to left)  8-22

## 2024-02-20 ENCOUNTER — HOSPITAL ENCOUNTER (OUTPATIENT)
Facility: HOSPITAL | Age: 79
Setting detail: RECURRING SERIES
Discharge: HOME | End: 2024-02-23
Payer: MEDICARE

## 2024-02-20 PROCEDURE — 97530 THERAPEUTIC ACTIVITIES: CPT

## 2024-02-20 PROCEDURE — 97112 NEUROMUSCULAR REEDUCATION: CPT

## 2024-02-20 PROCEDURE — 97110 THERAPEUTIC EXERCISES: CPT

## 2024-02-20 NOTE — PROGRESS NOTES
PHYSICAL / OCCUPATIONAL THERAPY - DAILY TREATMENT NOTE    Patient Name: Neil Hong    Date: 2024    : 1945  Insurance: Payor: MEDICARE / Plan: MEDICARE PART A AND B / Product Type: *No Product type* /      Patient  verified Yes     Visit #   Current / Total 8 12   Time   In / Out 1005 am  1055   Pain   In / Out 0/10  010    Subjective Functional Status/Changes: \"I'm feeling a little chun today.\"      TREATMENT AREA =  Pain in right hip [M25.551]  Abnormality of gait [R26.9]    OBJECTIVE         Therapeutic Procedures:    Tx Min Billable or 1:1 Min (if diff from Tx Min) Procedure, Rationale, Specifics   20   34605 Therapeutic Exercise (timed):  increase ROM, strength, coordination, balance, and proprioception to improve patient's ability to progress to PLOF and address remaining functional goals. (see flow sheet as applicable)     Details if applicable:       15  52287 Neuromuscular Re-Education (timed):  improve balance, coordination, kinesthetic sense, posture, core stability and proprioception to improve patient's ability to develop conscious control of individual muscles and awareness of position of extremities in order to progress to PLOF and address remaining functional goals. (see flow sheet as applicable)     Details if applicable:     15  37743 Therapeutic Activity (timed):  use of dynamic activities replicating functional movements to increase ROM, strength, coordination, balance, and proprioception in order to improve patient's ability to progress to PLOF and address remaining functional goals.  (see flow sheet as applicable)     Details if applicable:            Details if applicable:            Details if applicable:     50   MC BC Totals Reminder: bill using total billable min of TIMED therapeutic procedures (example: do not include dry needle or estim unattended, both untimed codes, in totals to left)  8-22 min = 1 unit; 23-37 min = 2 units; 38-52 min = 3 units; 53-67 min = 4

## 2024-02-22 ENCOUNTER — HOSPITAL ENCOUNTER (OUTPATIENT)
Facility: HOSPITAL | Age: 79
Setting detail: RECURRING SERIES
Discharge: HOME | End: 2024-02-25
Payer: MEDICARE

## 2024-02-22 PROCEDURE — 97112 NEUROMUSCULAR REEDUCATION: CPT

## 2024-02-22 PROCEDURE — 97530 THERAPEUTIC ACTIVITIES: CPT

## 2024-02-22 PROCEDURE — 97110 THERAPEUTIC EXERCISES: CPT

## 2024-02-22 NOTE — PROGRESS NOTES
Patient Education billed concurrently with other procedures   [x] Review HEP    [] Progressed/Changed HEP, detail:    [] Other detail:       Objective Information/Functional Measures/Assessment  Pt responded well to each strengthening there ex with no difficulty or pain but required rest break due to fatigue. Overall pt is progressing towards all goals.      Patient will continue to benefit from skilled PT / OT services to modify and progress therapeutic interventions, analyze and address functional mobility deficits, analyze and address ROM deficits, analyze and address strength deficits, analyze and address soft tissue restrictions, analyze and cue for proper movement patterns, analyze and modify for postural abnormalities, analyze and address imbalance/dizziness, and instruct in home and community integration to address functional deficits and attain remaining goals.    Progress toward goals / Updated goals:  []  See Progress Note/Recertification  1  patient will have SL Abduction right LE 4,4+ to aid with increase stability for ambulation with less [place] and increase I to ambulate with no AD for community activities  PN: 4  2/15/24     2 patient will ambulated 6 minutes level surface in clinic with no AD use and no LOB for carryover to community activities and activities at home  PN:reports able to tolerate walking 5-6 minutes and without cane use , mostly limited by decrease endurance (\"aerobic\") 2/15/24     3  Pt will ascend and descend 12 steps with no cane, R HR and reciprocal gait pattern.   PN: 8\" stairs 5x reciprocal ascending leading with the right LE f/b 5x reciprocal  leading with the left LE no LOB 1-2 rails light touch descending > ascending. . 2/22/24        Next PN/ RC due 3/16/2024  Auth due (visit number/ date) Med [facility]ec         PLAN  - Continue Plan of Care    Karen Phillips, MICK    2/22/2024    10:25 AM    Future Appointments   Date Time Provider Department Center   6/17/2024  8:30 AM

## 2024-02-27 ENCOUNTER — HOSPITAL ENCOUNTER (OUTPATIENT)
Facility: HOSPITAL | Age: 79
Setting detail: RECURRING SERIES
Discharge: HOME | End: 2024-03-01
Payer: MEDICARE

## 2024-02-27 PROCEDURE — 97112 NEUROMUSCULAR REEDUCATION: CPT

## 2024-02-27 PROCEDURE — 97530 THERAPEUTIC ACTIVITIES: CPT

## 2024-02-27 PROCEDURE — 97110 THERAPEUTIC EXERCISES: CPT

## 2024-02-27 NOTE — PROGRESS NOTES
PHYSICAL / OCCUPATIONAL THERAPY - DAILY TREATMENT NOTE    Patient Name: Neil Hong    Date: 2024    : 1945  Insurance: Payor: MEDICARE / Plan: MEDICARE PART A AND B / Product Type: *No Product type* /      Patient  verified Yes     Visit #   Current / Total 10 12   Time   In / Out 330 414   Pain   In / Out 0 0   Subjective Functional Status/Changes: Patient reports that he would like an update HEP.      TREATMENT AREA =  Pain in right hip [M25.551]  Abnormality of gait [R26.9]    OBJECTIVE         Therapeutic Procedures:    Tx Min Billable or 1:1 Min (if diff from Tx Min) Procedure, Rationale, Specifics   15 15 91069 Therapeutic Exercise (timed):  increase ROM, strength, coordination, balance, and proprioception to improve patient's ability to progress to PLOF and address remaining functional goals. (see flow sheet as applicable)     Details if applicable:       15 15 81535 Neuromuscular Re-Education (timed):  improve balance, coordination, kinesthetic sense, posture, core stability and proprioception to improve patient's ability to develop conscious control of individual muscles and awareness of position of extremities in order to progress to PLOF and address remaining functional goals. (see flow sheet as applicable)     Details if applicable:      77140 Therapeutic Activity (timed):  use of dynamic activities replicating functional movements to increase ROM, strength, coordination, balance, and proprioception in order to improve patient's ability to progress to PLOF and address remaining functional goals.  (see flow sheet as applicable)     Details if applicable:     x x    x  Details if applicable:     x x  x    Details if applicable:     44 44 [facility] Totals Reminder: bill using total billable min of TIMED therapeutic procedures (example: do not include dry needle or estim unattended, both untimed codes, in totals to left)  8-22 min = 1 unit; 23-37 min = 2 units; 38-52 min = 3 units;

## 2024-02-29 ENCOUNTER — HOSPITAL ENCOUNTER (OUTPATIENT)
Facility: HOSPITAL | Age: 79
Setting detail: RECURRING SERIES
End: 2024-02-29
Payer: MEDICARE

## 2024-02-29 PROCEDURE — 97112 NEUROMUSCULAR REEDUCATION: CPT

## 2024-02-29 PROCEDURE — 97110 THERAPEUTIC EXERCISES: CPT

## 2024-02-29 PROCEDURE — 97530 THERAPEUTIC ACTIVITIES: CPT

## 2024-02-29 NOTE — PROGRESS NOTES
(example: do not include dry needle or estim unattended, both untimed codes, in totals to left)  8-22 min = 1 unit; 23-37 min = 2 units; 38-52 min = 3 units; 53-67 min = 4 units; 68-82 min = 5 units   Total Total     [x]  Patient Education billed concurrently with other procedures   [x] Review HEP    [] Progressed/Changed HEP, detail:    [] Other detail:       Objective Information/Functional Measures/Assessment    Program modified today to:   Standing hip abd and ham curl 7 1/2# 2x10 instead of golden  Standing donkey kick 7 1/2 # 10x  Bike hills level 11 , 10'   POC discussed for DC to attempted self management at next session thru MD follow up 6/17/24 unless he needs to try and move that up due to recent groin pain.   Progressing as able and as expected with reports of right groin pain noted especially yesterday after last session    Patient will continue to benefit from skilled PT / OT services to modify and progress therapeutic interventions, analyze and address functional mobility deficits, analyze and address ROM deficits, analyze and address strength deficits, analyze and address soft tissue restrictions, analyze and cue for proper movement patterns, analyze and modify for postural abnormalities, analyze and address imbalance/dizziness, and instruct in home and community integration to address functional deficits and attain remaining goals.    Progress toward goals / Updated goals:  []  See Progress Note/Recertification  1  patient will have SL Abduction right LE 4,4+ to aid with increase stability for ambulation with less [place] and increase I to ambulate with no AD for community activities  PN: 4  2/15/24  CURRENT NA , note groin pain today 2/29/24     2 patient will ambulated 6 minutes level surface in clinic with no AD use and no LOB for carryover to community activities and activities at home  PN:reports able to tolerate walking 5-6 minutes and without cane use , mostly limited by decrease

## 2024-03-05 ENCOUNTER — HOSPITAL ENCOUNTER (OUTPATIENT)
Facility: HOSPITAL | Age: 79
Setting detail: RECURRING SERIES
Discharge: HOME | End: 2024-03-08
Payer: MEDICARE

## 2024-03-05 PROCEDURE — 97530 THERAPEUTIC ACTIVITIES: CPT

## 2024-03-05 PROCEDURE — 97110 THERAPEUTIC EXERCISES: CPT

## 2024-03-06 NOTE — PROGRESS NOTES
Patient Name: Neil Hong    : 1945    Seen for visit 12 of 12   On 3/5/24 (date)    Downtime documentation for this visit performed, due to unavailable EMR, and scanned into chart media.    Karen Phillips PTA

## 2024-06-16 NOTE — PROGRESS NOTES
Patient: Neil Hong                MRN: [id number]       SSN: [id number]  YOB: 1945        AGE: 78 y.o.        SEX: male  BMI: Body mass index is 40.9 kg/m².    PCP: Hue Buchanan MD  06/17/24    Chief Complaint: Hip Pain (Right hip )      1. Presence of right artificial hip joint  2. Obesity, morbid (HCC)  3. Spinal stenosis, lumbar region, with neurogenic claudication        HPI:  Neil Hong is a 78 y.o. male with chief complaint of   Chief Complaint   Patient presents with    Hip Pain     Right hip      -Right total hip arthroplasty by Dr. Comer May 24, 2022    Patient is having pain in the thigh and then upon sending him to physical therapy has noticed significant improvements in his strength and his ambulation and he is not having any pain in the right side.  He is working out his back pain and sciatica with Dr. Murcia and has made some progress there as well.      IMAGING:  Imaging read by myself and interpreted as follows:    December 12, 2023:  Three-view x-rays of the right femur including AP and lateral of the right femur demonstrates well-positioned press-fit total hip arthroplasty with a lateralized liner.  There is no evidence of loosening or pedestal formation nor of cortical hypertrophy.    August 31, 2023:  Three-view x-ray of the right hip including AP pelvis and AP and lateral of the right hip demonstrates a well-positioned press-fit total hip arthroplasty with a lateralized liner without signs of complication.      PHYSICAL EXAMINATION:  Ht 1.854 m (6' 1\")   Wt (!) 140.6 kg (310 lb)   BMI 40.90 kg/m²   Body mass index is 40.9 kg/m².  Wt Readings from Last 3 Encounters:   06/17/24 (!) 140.6 kg (310 lb)   12/12/23 (!) 149.2 kg (329 lb)   11/15/23 (!) 146.1 kg (322 lb)       GENERAL: Alert and oriented x3, in no acute distress.  HEENT: Normocephalic, atraumatic.    MSK: Right Hip Exam     Tenderness   The patient is experiencing no tenderness.     Range

## 2024-06-17 ENCOUNTER — OFFICE VISIT (OUTPATIENT)
Age: 79
End: 2024-06-17
Payer: MEDICARE

## 2024-06-17 VITALS — WEIGHT: 310 LBS | HEIGHT: 73 IN | BODY MASS INDEX: 41.08 KG/M2

## 2024-06-17 DIAGNOSIS — Z96.641 PRESENCE OF RIGHT ARTIFICIAL HIP JOINT: Primary | ICD-10-CM

## 2024-06-17 DIAGNOSIS — E66.01 OBESITY, MORBID (HCC): ICD-10-CM

## 2024-06-17 DIAGNOSIS — M48.062 SPINAL STENOSIS, LUMBAR REGION, WITH NEUROGENIC CLAUDICATION: ICD-10-CM

## 2024-06-17 PROCEDURE — G8417 CALC BMI ABV UP PARAM F/U: HCPCS | Performed by: ORTHOPAEDIC SURGERY

## 2024-06-17 PROCEDURE — 99213 OFFICE O/P EST LOW 20 MIN: CPT | Performed by: ORTHOPAEDIC SURGERY

## 2024-06-17 PROCEDURE — G8428 CUR MEDS NOT DOCUMENT: HCPCS | Performed by: ORTHOPAEDIC SURGERY

## 2024-06-17 PROCEDURE — 1036F TOBACCO NON-USER: CPT | Performed by: ORTHOPAEDIC SURGERY

## 2024-06-17 PROCEDURE — 1123F ACP DISCUSS/DSCN MKR DOCD: CPT | Performed by: ORTHOPAEDIC SURGERY

## 2024-07-01 DIAGNOSIS — Z96.641 PRESENCE OF RIGHT ARTIFICIAL HIP JOINT: ICD-10-CM

## 2024-07-01 RX ORDER — AMOXICILLIN 500 MG/1
CAPSULE ORAL
Qty: 4 CAPSULE | Refills: 3 | Status: SHIPPED | OUTPATIENT
Start: 2024-07-01

## 2024-07-01 RX ORDER — AMOXICILLIN 500 MG/1
CAPSULE ORAL
Refills: 0 | OUTPATIENT
Start: 2024-07-01

## 2024-12-19 NOTE — PROGRESS NOTES
No       Current Outpatient Medications   Medication Sig Dispense Refill    levothyroxine (SYNTHROID) 175 MCG tablet Take 1 tablet by mouth Daily      amoxicillin (AMOXIL) 500 MG capsule TAKE 4 CAPSULES BY MOUTH 1 HOUR BEFORE DENTAL APPOINTMENT 4 capsule 3    rosuvastatin (CRESTOR) 10 MG tablet       metoprolol succinate (TOPROL XL) 100 MG extended release tablet Take 1 tablet by mouth      Cholecalciferol (VITAMIN D3) 1000 units CAPS Take 1,000 mg by mouth daily      levothyroxine (SYNTHROID) 150 MCG tablet Take 1 tablet by mouth       No current facility-administered medications for this visit.       Allergies   Allergen Reactions    Bee Venom Swelling    Statins Myalgia    Wasp Venom Protein           PHYSICAL EXAMINATION    Temp 97 °F (36.1 °C) (Temporal)   Ht 1.854 m (6' 1\")   Wt (!) 141.5 kg (312 lb)   BMI 41.16 kg/m²       CONSTITUTIONAL: NAD, A&O x 3    Ambulates without an assistive device.    MOTOR:  Straight Leg Raise: Negative, Bilaterally     Hip Flex Knee Ext Knee Flex Ankle DF GTE Ankle PF Tone   Right +4/5 +4/5 +4/5 +4/5 +4/5 +4/5 +4/5   Left +4/5 +4/5 +4/5 +4/5 +4/5 +4/5 +4/5     SENSATION: Sensation is intact to light touch throughout.         ASSESSMENT   Neil \"Mitch\" was seen today for back pain.    Diagnoses and all orders for this visit:    Spinal stenosis, lumbar region, with neurogenic claudication  -     Ambulatory Referral to Spine Injection    Lumbar neuritis  -     Ambulatory Referral to Spine Injection    Degeneration of intervertebral disc of lumbosacral region with discogenic back pain and lower extremity pain  -     Ambulatory Referral to Spine Injection    Lumbosacral spondylosis without myelopathy  -     Ambulatory Referral to Spine Injection    Facet arthropathy, lumbar  -     Ambulatory Referral to Spine Injection    Spondylolisthesis, lumbar region  -     Ambulatory Referral to Spine Injection    Foraminal stenosis of lumbosacral region  -     Ambulatory Referral to Spine

## 2024-12-20 ENCOUNTER — OFFICE VISIT (OUTPATIENT)
Age: 79
End: 2024-12-20
Payer: MEDICARE

## 2024-12-20 VITALS — TEMPERATURE: 97 F | WEIGHT: 312 LBS | BODY MASS INDEX: 41.35 KG/M2 | HEIGHT: 73 IN

## 2024-12-20 DIAGNOSIS — M54.16 LUMBAR NEURITIS: ICD-10-CM

## 2024-12-20 DIAGNOSIS — M51.372 DEGENERATION OF INTERVERTEBRAL DISC OF LUMBOSACRAL REGION WITH DISCOGENIC BACK PAIN AND LOWER EXTREMITY PAIN: ICD-10-CM

## 2024-12-20 DIAGNOSIS — M47.816 FACET ARTHROPATHY, LUMBAR: ICD-10-CM

## 2024-12-20 DIAGNOSIS — M48.07 FORAMINAL STENOSIS OF LUMBOSACRAL REGION: ICD-10-CM

## 2024-12-20 DIAGNOSIS — M43.16 SPONDYLOLISTHESIS, LUMBAR REGION: ICD-10-CM

## 2024-12-20 DIAGNOSIS — M47.817 LUMBOSACRAL SPONDYLOSIS WITHOUT MYELOPATHY: ICD-10-CM

## 2024-12-20 DIAGNOSIS — M48.062 SPINAL STENOSIS, LUMBAR REGION, WITH NEUROGENIC CLAUDICATION: Primary | ICD-10-CM

## 2024-12-20 PROCEDURE — G8484 FLU IMMUNIZE NO ADMIN: HCPCS | Performed by: PHYSICAL MEDICINE & REHABILITATION

## 2024-12-20 PROCEDURE — 1036F TOBACCO NON-USER: CPT | Performed by: PHYSICAL MEDICINE & REHABILITATION

## 2024-12-20 PROCEDURE — G8417 CALC BMI ABV UP PARAM F/U: HCPCS | Performed by: PHYSICAL MEDICINE & REHABILITATION

## 2024-12-20 PROCEDURE — 1160F RVW MEDS BY RX/DR IN RCRD: CPT | Performed by: PHYSICAL MEDICINE & REHABILITATION

## 2024-12-20 PROCEDURE — 1125F AMNT PAIN NOTED PAIN PRSNT: CPT | Performed by: PHYSICAL MEDICINE & REHABILITATION

## 2024-12-20 PROCEDURE — 99214 OFFICE O/P EST MOD 30 MIN: CPT | Performed by: PHYSICAL MEDICINE & REHABILITATION

## 2024-12-20 PROCEDURE — G8427 DOCREV CUR MEDS BY ELIG CLIN: HCPCS | Performed by: PHYSICAL MEDICINE & REHABILITATION

## 2024-12-20 PROCEDURE — 1159F MED LIST DOCD IN RCRD: CPT | Performed by: PHYSICAL MEDICINE & REHABILITATION

## 2024-12-20 PROCEDURE — 1123F ACP DISCUSS/DSCN MKR DOCD: CPT | Performed by: PHYSICAL MEDICINE & REHABILITATION

## 2024-12-20 RX ORDER — LEVOTHYROXINE SODIUM 175 UG/1
175 TABLET ORAL DAILY
COMMUNITY

## 2025-01-09 ENCOUNTER — SCHEDULED TELEPHONE ENCOUNTER (OUTPATIENT)
Age: 80
End: 2025-01-09

## 2025-01-09 DIAGNOSIS — M48.062 SPINAL STENOSIS OF LUMBAR REGION WITH NEUROGENIC CLAUDICATION: Primary | ICD-10-CM

## 2025-01-09 DIAGNOSIS — M51.362 DEGENERATION OF INTERVERTEBRAL DISC OF LUMBAR REGION WITH DISCOGENIC BACK PAIN AND LOWER EXTREMITY PAIN: ICD-10-CM

## 2025-01-09 DIAGNOSIS — M43.16 SPONDYLOLISTHESIS, LUMBAR REGION: ICD-10-CM

## 2025-01-09 DIAGNOSIS — M54.50 LUMBAR PAIN: ICD-10-CM

## 2025-01-09 DIAGNOSIS — M47.817 LUMBOSACRAL SPONDYLOSIS WITHOUT MYELOPATHY: ICD-10-CM

## 2025-01-09 DIAGNOSIS — M54.16 LUMBAR NEURITIS: ICD-10-CM

## 2025-01-09 RX ORDER — IBUPROFEN 800 MG/1
800 TABLET, FILM COATED ORAL 3 TIMES DAILY PRN
Qty: 30 TABLET | Refills: 0 | Status: SHIPPED | OUTPATIENT
Start: 2025-01-09

## 2025-01-09 NOTE — PROGRESS NOTES
Neil Hong presents today for   Chief Complaint   Patient presents with    Back Problem    Pain    Back Pain       Is someone accompanying this pt? no    Is the patient using any DME equipment during OV? no    Depression Screening:       No data to display                Learning Assessment:  Failed to redirect to the Timeline version of the [employer] SmartLink.    Abuse Screening:       No data to display                Fall Risk  Failed to redirect to the Timeline version of the [employer] SmartLink.    OPIOID RISK TOOL  Failed to redirect to the Timeline version of the [employer] Smart[employer].    Coordination of Care:  1. Have you been to the ER, urgent care clinic since your last visit? no  Hospitalized since your last visit? no    2. Have you seen or consulted any other health care providers outside of the [facility] since your last visit? no Include any pap smears or colon screening. no

## 2025-01-09 NOTE — PROGRESS NOTES
Neil Hong is a 79 y.o. male evaluated via telephone on 1/9/2025 for Back Problem, Pain, and Back Pain  .      History of Present Illness: Patient is a 79-year-old male who is going to be undergoing a L3-4 epidural steroid injection on January 21, 2024.  He has low back pain with right lower extremity pain from his groin down to his knee.  He states now additionally he is having terrible left lower extremity pain especially if he has a full bladder or gas or lays down.  He states the only thing that is even remotely relieving it is 800 ibuprofen but he is trying to stay off of that so he can get this block done.  He is requesting the block to be moved up sooner.  He finds he can even hardly sleep at night due to the pain.  He denies fever bowel bladder dysfunction.    Physical Exam: Patient is a 79-year-old male who was alert and oriented.  He spoke with fluency.  He did not appear to be in distress      Assessment/Plan:.  This is a patient who has lumbar spinal stenosis lumbar degenerative disc disease lumbar spondylolisthesis and facet arthropathy.  I have sent a note to our  to see if his block can be moved up.  I sent in some ibuprofen 800 but he knows not to take it if he wants to try and get this block done earlier.  If the block cannot be moved up he knows to hold ibuprofen for 5 days prior to the block.  We will see him back after the block.    Neil \"Viviana" was seen today for back problem, pain and back pain.    Diagnoses and all orders for this visit:    Spinal stenosis of lumbar region with neurogenic claudication  -     ibuprofen (ADVIL;MOTRIN) 800 MG tablet; Take 1 tablet by mouth 3 times daily as needed for Pain    Lumbar pain  -     ibuprofen (ADVIL;MOTRIN) 800 MG tablet; Take 1 tablet by mouth 3 times daily as needed for Pain    Lumbar neuritis  -     ibuprofen (ADVIL;MOTRIN) 800 MG tablet; Take 1 tablet by mouth 3 times daily as needed for Pain    Degeneration of intervertebral disc

## 2025-01-30 ENCOUNTER — PATIENT MESSAGE (OUTPATIENT)
Age: 80
End: 2025-01-30

## 2025-02-13 NOTE — PROGRESS NOTES
tablet Take 1 tablet by mouth 2 times daily as needed for Muscle spasms 40 tablet 0    methylPREDNISolone (MEDROL DOSEPACK) 4 MG tablet Follow package directions 1 kit 0    ibuprofen (ADVIL;MOTRIN) 800 MG tablet Take 1 tablet by mouth 3 times daily as needed for Pain 30 tablet 0    levothyroxine (SYNTHROID) 175 MCG tablet Take 1 tablet by mouth Daily      rosuvastatin (CRESTOR) 10 MG tablet       metoprolol succinate (TOPROL XL) 100 MG extended release tablet Take 1 tablet by mouth      amoxicillin (AMOXIL) 500 MG capsule TAKE 4 CAPSULES BY MOUTH 1 HOUR BEFORE DENTAL APPOINTMENT (Patient not taking: Reported on 1/9/2025) 4 capsule 3    Cholecalciferol (VITAMIN D3) 1000 units CAPS Take 1,000 mg by mouth daily      levothyroxine (SYNTHROID) 150 MCG tablet Take 1 tablet by mouth       No current facility-administered medications for this visit.       Allergies   Allergen Reactions    Bee Venom Swelling    Statins Myalgia    Wasp Venom Protein           PHYSICAL EXAMINATION    Pulse 83   Temp 98 °F (36.7 °C) (Skin)   Ht 1.854 m (6' 1\")   Wt (!) 145.2 kg (320 lb)   SpO2 98%   BMI 42.22 kg/m²       CONSTITUTIONAL: NAD, A&O x 3    Ambulates without an assistive device.    MOTOR:  Straight Leg Raise: Negative, Bilaterally     Hip Flex Knee Ext Knee Flex Ankle DF GTE Ankle PF Tone   Right +4/5 +4/5 +4/5 +4/5 +4/5 +4/5 +4/5   Left +4/5 +4/5 +4/5 +4/5 +4/5 +4/5 +4/5     SENSATION: Sensation is intact to light touch throughout.     RADIOGRAPHS  Lumbar spine plain films dated 2/14/2025. 4 views: AP, Lateral, Flexion, and Extension. revealed:  Left hip arthroplasty unable to visualize right hip  Exaggerated thoracic kyphos  Extensive osteophyte formation of the lower thoracic spine and bridging osteophytes of the upper lumbar spine   Trace retrolisthesis of L2 on 3 and L3 on 4   No motion on flexion or extension   Pan lumbar degenerative disease  No acute pathology identified     ASSESSMENT   Neil \"Mitch\" was seen today for

## 2025-02-14 ENCOUNTER — OFFICE VISIT (OUTPATIENT)
Age: 80
End: 2025-02-14

## 2025-02-14 VITALS
TEMPERATURE: 98 F | WEIGHT: 315 LBS | HEART RATE: 83 BPM | HEIGHT: 73 IN | BODY MASS INDEX: 41.75 KG/M2 | OXYGEN SATURATION: 98 %

## 2025-02-14 DIAGNOSIS — M43.16 SPONDYLOLISTHESIS OF LUMBAR REGION: ICD-10-CM

## 2025-02-14 DIAGNOSIS — M51.360 DEGENERATION OF INTERVERTEBRAL DISC OF LUMBAR REGION WITH DISCOGENIC BACK PAIN: ICD-10-CM

## 2025-02-14 DIAGNOSIS — M47.816 LUMBAR SPONDYLOSIS: Primary | ICD-10-CM

## 2025-02-14 RX ORDER — METHYLPREDNISOLONE 4 MG/1
TABLET ORAL
Qty: 1 KIT | Refills: 0 | Status: SHIPPED | OUTPATIENT
Start: 2025-02-14

## 2025-02-14 RX ORDER — CYCLOBENZAPRINE HCL 10 MG
10 TABLET ORAL 2 TIMES DAILY PRN
Qty: 40 TABLET | Refills: 0 | Status: SHIPPED | OUTPATIENT
Start: 2025-02-14 | End: 2025-03-06

## 2025-02-27 ENCOUNTER — HOSPITAL ENCOUNTER (OUTPATIENT)
Facility: HOSPITAL | Age: 80
Discharge: HOME | End: 2025-02-27
Attending: PHYSICAL MEDICINE & REHABILITATION
Payer: MEDICARE

## 2025-02-27 DIAGNOSIS — M43.16 SPONDYLOLISTHESIS OF LUMBAR REGION: ICD-10-CM

## 2025-02-27 DIAGNOSIS — M51.360 DEGENERATION OF INTERVERTEBRAL DISC OF LUMBAR REGION WITH DISCOGENIC BACK PAIN: ICD-10-CM

## 2025-02-27 DIAGNOSIS — M47.816 LUMBAR SPONDYLOSIS: ICD-10-CM

## 2025-02-27 PROCEDURE — 72148 MRI LUMBAR SPINE W/O DYE: CPT

## 2025-03-19 ENCOUNTER — OFFICE VISIT (OUTPATIENT)
Age: 80
End: 2025-03-19
Payer: MEDICARE

## 2025-03-19 VITALS
HEART RATE: 79 BPM | BODY MASS INDEX: 41.75 KG/M2 | HEIGHT: 73 IN | OXYGEN SATURATION: 96 % | TEMPERATURE: 98 F | WEIGHT: 315 LBS

## 2025-03-19 DIAGNOSIS — M51.360 DEGENERATION OF INTERVERTEBRAL DISC OF LUMBAR REGION WITH DISCOGENIC BACK PAIN: ICD-10-CM

## 2025-03-19 DIAGNOSIS — M47.816 LUMBAR SPONDYLOSIS: Primary | ICD-10-CM

## 2025-03-19 DIAGNOSIS — M47.814 THORACIC SPONDYLOSIS: ICD-10-CM

## 2025-03-19 DIAGNOSIS — M54.14 THORACIC NEURITIS: ICD-10-CM

## 2025-03-19 DIAGNOSIS — M43.16 SPONDYLOLISTHESIS OF LUMBAR REGION: ICD-10-CM

## 2025-03-19 DIAGNOSIS — M54.6 THORACIC SPINE PAIN: ICD-10-CM

## 2025-03-19 PROCEDURE — 1036F TOBACCO NON-USER: CPT | Performed by: PHYSICAL MEDICINE & REHABILITATION

## 2025-03-19 PROCEDURE — G8417 CALC BMI ABV UP PARAM F/U: HCPCS | Performed by: PHYSICAL MEDICINE & REHABILITATION

## 2025-03-19 PROCEDURE — 1125F AMNT PAIN NOTED PAIN PRSNT: CPT | Performed by: PHYSICAL MEDICINE & REHABILITATION

## 2025-03-19 PROCEDURE — 1123F ACP DISCUSS/DSCN MKR DOCD: CPT | Performed by: PHYSICAL MEDICINE & REHABILITATION

## 2025-03-19 PROCEDURE — 1159F MED LIST DOCD IN RCRD: CPT | Performed by: PHYSICAL MEDICINE & REHABILITATION

## 2025-03-19 PROCEDURE — 72070 X-RAY EXAM THORAC SPINE 2VWS: CPT | Performed by: PHYSICAL MEDICINE & REHABILITATION

## 2025-03-19 PROCEDURE — 99214 OFFICE O/P EST MOD 30 MIN: CPT | Performed by: PHYSICAL MEDICINE & REHABILITATION

## 2025-03-19 PROCEDURE — G8427 DOCREV CUR MEDS BY ELIG CLIN: HCPCS | Performed by: PHYSICAL MEDICINE & REHABILITATION

## 2025-03-19 RX ORDER — GABAPENTIN 100 MG/1
100 CAPSULE ORAL 3 TIMES DAILY
Qty: 90 CAPSULE | Refills: 2 | Status: SHIPPED | OUTPATIENT
Start: 2025-03-19 | End: 2025-06-17

## 2025-03-19 NOTE — PROGRESS NOTES
MRI THORACIC SPINE WO CONTRAST; Future  -     gabapentin (NEURONTIN) 100 MG capsule; Take 1 capsule by mouth 3 times daily for 90 days. Max Daily Amount: 300 mg      IMPRESSION AND PLAN:  Patient returns to the office today with c/o left sided thoracolumbar junction pain that radiates bilaterally around to the abdomen. Multiple treatment options were discussed. I will order a Thoracic spine MRI w/o. I advised him to bring copies of the films to the next office visit. I discussed starting him on a neuropathic medication, pt wished to proceed. I will try him on Gabapentin 100 mg TID. The risks, benefits, and potential side effects of this medication were discussed. He understands and wishes to proceed. I advised him to continue taking the medication even if they do not see any relief. I told him to call the office if intolerant to the new medication. The patient is Neurologically intact. I will see the patient back in 2 months or earlier if needed.     Written by Mirhta Bowen medical scribe, as dictated by Denys Murcia MD  I examined the patient, reviewed and agree with the note.

## 2025-03-22 ENCOUNTER — HOSPITAL ENCOUNTER (OUTPATIENT)
Facility: HOSPITAL | Age: 80
Discharge: HOME | End: 2025-03-25
Attending: PHYSICAL MEDICINE & REHABILITATION
Payer: MEDICARE

## 2025-03-22 DIAGNOSIS — M51.360 DEGENERATION OF INTERVERTEBRAL DISC OF LUMBAR REGION WITH DISCOGENIC BACK PAIN: ICD-10-CM

## 2025-03-22 DIAGNOSIS — M54.14 THORACIC NEURITIS: ICD-10-CM

## 2025-03-22 DIAGNOSIS — M47.814 THORACIC SPONDYLOSIS: ICD-10-CM

## 2025-03-22 DIAGNOSIS — M43.16 SPONDYLOLISTHESIS OF LUMBAR REGION: ICD-10-CM

## 2025-03-22 DIAGNOSIS — M47.816 LUMBAR SPONDYLOSIS: ICD-10-CM

## 2025-03-22 DIAGNOSIS — M54.6 THORACIC SPINE PAIN: ICD-10-CM

## 2025-03-22 PROCEDURE — 72146 MRI CHEST SPINE W/O DYE: CPT
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
## 2025-05-14 ENCOUNTER — OFFICE VISIT (OUTPATIENT)
Age: 80
End: 2025-05-14
Payer: MEDICARE

## 2025-05-14 ENCOUNTER — TELEPHONE (OUTPATIENT)
Age: 80
End: 2025-05-14

## 2025-05-14 DIAGNOSIS — M51.360 DEGENERATION OF INTERVERTEBRAL DISC OF LUMBAR REGION WITH DISCOGENIC BACK PAIN: ICD-10-CM

## 2025-05-14 DIAGNOSIS — M47.816 LUMBAR SPONDYLOSIS: Primary | ICD-10-CM

## 2025-05-14 DIAGNOSIS — M54.14 THORACIC NEURITIS: ICD-10-CM

## 2025-05-14 DIAGNOSIS — M47.814 THORACIC SPONDYLOSIS: ICD-10-CM

## 2025-05-14 DIAGNOSIS — M54.6 THORACIC SPINE PAIN: ICD-10-CM

## 2025-05-14 DIAGNOSIS — M43.16 SPONDYLOLISTHESIS OF LUMBAR REGION: ICD-10-CM

## 2025-05-14 PROCEDURE — 1123F ACP DISCUSS/DSCN MKR DOCD: CPT | Performed by: PHYSICAL MEDICINE & REHABILITATION

## 2025-05-14 PROCEDURE — 1036F TOBACCO NON-USER: CPT | Performed by: PHYSICAL MEDICINE & REHABILITATION

## 2025-05-14 PROCEDURE — G8427 DOCREV CUR MEDS BY ELIG CLIN: HCPCS | Performed by: PHYSICAL MEDICINE & REHABILITATION

## 2025-05-14 PROCEDURE — 99214 OFFICE O/P EST MOD 30 MIN: CPT | Performed by: PHYSICAL MEDICINE & REHABILITATION

## 2025-05-14 PROCEDURE — 1159F MED LIST DOCD IN RCRD: CPT | Performed by: PHYSICAL MEDICINE & REHABILITATION

## 2025-05-14 PROCEDURE — G8417 CALC BMI ABV UP PARAM F/U: HCPCS | Performed by: PHYSICAL MEDICINE & REHABILITATION

## 2025-05-14 PROCEDURE — 1160F RVW MEDS BY RX/DR IN RCRD: CPT | Performed by: PHYSICAL MEDICINE & REHABILITATION

## 2025-05-14 RX ORDER — GABAPENTIN 300 MG/1
300 CAPSULE ORAL 3 TIMES DAILY
Qty: 90 CAPSULE | Refills: 2 | Status: SHIPPED | OUTPATIENT
Start: 2025-05-14 | End: 2025-08-12

## 2025-05-20 ENCOUNTER — HOSPITAL ENCOUNTER (OUTPATIENT)
Facility: HOSPITAL | Age: 80
Setting detail: RECURRING SERIES
Discharge: HOME | End: 2025-05-23
Attending: PHYSICAL MEDICINE & REHABILITATION
Payer: MEDICARE

## 2025-05-20 PROCEDURE — 97535 SELF CARE MNGMENT TRAINING: CPT

## 2025-05-20 PROCEDURE — 97110 THERAPEUTIC EXERCISES: CPT

## 2025-05-20 PROCEDURE — 97161 PT EVAL LOW COMPLEX 20 MIN: CPT

## 2025-05-20 PROCEDURE — 97162 PT EVAL MOD COMPLEX 30 MIN: CPT

## 2025-05-20 NOTE — PROGRESS NOTES
deficits, analyze and address soft tissue restrictions, analyze and cue for proper movement patterns, analyze and modify for postural abnormalities, and instruct in home and community integration to address functional deficits and attain remaining goals.    [x]  See Plan of Care - for goals and assessment     PLAN  - Continue Plan of Care  - Upgrade activities as tolerated    Joann Mcgarry, PT 5/20/2025  10:44 AM  If an interpreting service was utilized for treatment of this patient, the contents of this document represent the material reviewed with the patient via the .

## 2025-05-20 NOTE — THERAPY EVALUATION
verbalization , and return demonstration   Persons(s) to be included in education: patient (P)  Barriers to Learning/Limitations: none  Measures taken if barriers to learning present: NA   Patient Goal (s): increase endurance, overcome muscle atrophy and months of inactivity due to pain. Would like to improve his endurance and activity tolerance and core strength.   Patient Self Reported Health Status: did not answer  Rehabilitation Potential: good    Short Term Goals: To be accomplished in 4 WEEKS  1 patient will have established and be I with HEP to aid with independence and self management at discharge  EVAL HEP issued at evaluation  2 patient will have  max pain 7/10 to aid with increase tolerance to ADLS and regular daily activities at home and in the community  EVAL 9-10    Long Term Goals: To be accomplished in 8 WEEKS  1 patient will have established and be I with HEP to aid with independence and self management at discharge  EVAL HEP issued at evaluation  2 patient will have max pain 4/10 to aid with increase tolerance to ADLS and regular daily activities at home and in the community  EVAL 9-10  3 patient will have tolerance to ambulation 190' with little to no difficulty for carryover to community activities  EVAL 50-75'  4 patient will have LEFS improved to 63 to show minimal projected gains  EVAL 54  5 patient will report overall 50% improvement to aid with increase tolerance to ADLS and activities at home and in the community and less pain in the mornings  EVAL CCO generalized weakness, decrease endurance and activity tolerance , pain worse in the morning        Frequency / Duration: Patient to be seen 2 times per week for 8 WEEKS    Patient/ Caregiver education and instruction: Diagnosis, prognosis, self care, activity modification, and exercises [x]  Plan of care has been reviewed with PTA    Certification Period: 5/20/2025 - 7/20/25    Joann Mcgarry, PT       5/20/2025       10:45 AM    Payor:

## 2025-05-22 ENCOUNTER — HOSPITAL ENCOUNTER (OUTPATIENT)
Facility: HOSPITAL | Age: 80
Setting detail: RECURRING SERIES
Discharge: HOME | End: 2025-05-25
Attending: PHYSICAL MEDICINE & REHABILITATION
Payer: MEDICARE

## 2025-05-22 PROCEDURE — 97112 NEUROMUSCULAR REEDUCATION: CPT

## 2025-05-22 PROCEDURE — 97110 THERAPEUTIC EXERCISES: CPT

## 2025-05-22 PROCEDURE — 97530 THERAPEUTIC ACTIVITIES: CPT

## 2025-05-22 NOTE — PROGRESS NOTES
and activity tolerance , pain worse in the morning  Current: Ongoing, will assess at next PN. 5/22/2025    Next PN/ RC due 6/20/2025  Auth due (visit number/ date) BALJIT/[facility] 12/31/2025    PLAN  - Continue Plan of Care  - Upgrade activities as tolerated    Svetlana Moon, MICK    5/22/2025    7:19 AM  If an interpreting service was utilized for treatment of this patient, the contents of this document represent the material reviewed with the patient via the .     Future Appointments   Date Time Provider Department Center   5/22/2025  7:30 AM Svetlana Moon, PTA [facility]   6/2/2025  7:30 AM Amee Felix, PT [facility]   6/4/2025  7:30 AM Karen hPillips, PTA [facility]   6/9/2025  7:30 AM Amee Felix, PT [facility]   6/11/2025  7:30 AM Karen Phillips, PTA [facility]   6/16/2025  7:30 AM Amee Felix, PT [facility]   6/18/2025  7:30 AM Joann Mcgarry, PT [facility]   6/23/2025  7:30 AM Amee Felix, PT [facility]   6/25/2025  7:30 AM Joann Mcgarry, PT [facility]   6/30/2025  7:30 AM Amee Felix, PT [facility]   7/16/2025  8:15 AM Denys Murcia MD [facility]

## 2025-06-02 ENCOUNTER — HOSPITAL ENCOUNTER (OUTPATIENT)
Facility: HOSPITAL | Age: 80
Setting detail: RECURRING SERIES
Discharge: HOME | End: 2025-06-05
Attending: PHYSICAL MEDICINE & REHABILITATION
Payer: MEDICARE

## 2025-06-02 PROCEDURE — 97110 THERAPEUTIC EXERCISES: CPT

## 2025-06-02 PROCEDURE — 97112 NEUROMUSCULAR REEDUCATION: CPT

## 2025-06-02 PROCEDURE — 97530 THERAPEUTIC ACTIVITIES: CPT

## 2025-06-02 NOTE — PROGRESS NOTES
it. Pt did well w/ performance of new tasks and demonstrated effective roll up which improved back pain on transfers. Follow up on new HEP next visit    Patient will continue to benefit from skilled PT / OT services to modify and progress therapeutic interventions, analyze and address functional mobility deficits, analyze and address ROM deficits, analyze and address strength deficits, analyze and address soft tissue restrictions, analyze and cue for proper movement patterns, analyze and modify for postural abnormalities, and instruct in home and community integration to address functional deficits and attain remaining goals.    Progress toward goals / Updated goals:  []  See Progress Note/Recertification    Short Term Goals: To be accomplished in 4 WEEKS  1 patient will have established and be I with HEP to aid with independence and self management at discharge  EVAL HEP issued at evaluation  Current Status: Performing HEP as given at IE; new HEP issued today 6/2/25    2 patient will have  max pain 7/10 to aid with increase tolerance to ADLS and regular daily activities at home and in the community  EVAL 9-10  Current: Patient reports 6/10 pain getting out of bed  today. 6/2/2025       Long Term Goals: To be accomplished in 8 WEEKS  1 patient will have established and be I with HEP to aid with independence and self management at discharge  EVAL HEP issued at evaluation    2 patient will have max pain 4/10 to aid with increase tolerance to ADLS and regular daily activities at home and in the community  EVAL 9-10  Current: Patient reports 0/10 pain today. 5/22/2025    3 patient will have tolerance to ambulation 190' with little to no difficulty for carryover to community activities  EVAL 50-75'    4 patient will have LEFS improved to 63 to show minimal projected gains  EVAL 54  Current: Ongoing, will assess at next PN. 5/22/2025    5 patient will report overall 50% improvement to aid with increase tolerance to ADLS

## 2025-06-04 ENCOUNTER — HOSPITAL ENCOUNTER (OUTPATIENT)
Facility: HOSPITAL | Age: 80
Setting detail: RECURRING SERIES
Discharge: HOME | End: 2025-06-07
Attending: PHYSICAL MEDICINE & REHABILITATION
Payer: MEDICARE

## 2025-06-04 PROCEDURE — 97112 NEUROMUSCULAR REEDUCATION: CPT

## 2025-06-04 PROCEDURE — 97530 THERAPEUTIC ACTIVITIES: CPT

## 2025-06-04 PROCEDURE — 97110 THERAPEUTIC EXERCISES: CPT

## 2025-06-04 NOTE — PROGRESS NOTES
PHYSICAL / OCCUPATIONAL THERAPY - DAILY TREATMENT NOTE    Patient Name: Neil Hong    Date: 2025    : 1945  Insurance: Payor: MEDICARE / Plan: MEDICARE PART A AND B / Product Type: *No Product type* /      Patient  verified Yes     Visit #   Current / Total 4 16   Time   In / Out 7:33 8:34   Pain   In / Out 0 0   Subjective Functional Status/Changes: \"In the morning is when I try to get out of the bed I'm so tender.\" \"But Im getting better     TREATMENT AREA =  Lumbar spondylosis  Degeneration of intervertebral disc of lumbar region with discogenic back pain  Spondylolisthesis of lumbar region  Thoracic spine pain  Thoracic spondylosis  Thoracic neuritis  Pain in thoracic spine  Other low back pain  Muscle weakness (generalized)    OBJECTIVE    Modalities Rationale:     decrease pain and increase tissue extensibility to improve patient's ability to progress to PLOF and address remaining functional goals.     min [] Estim Unattended, type/location:                                      []  w/ice    []  w/heat    min [] Estim Attended, type/location:                                     []  w/US     []  w/ice    []  w/heat    []  TENS insruct      min []  Mechanical Traction: type/lbs                   []  pro   []  sup   []  int   []  cont    []  before manual    []  after manual    min []  Ultrasound, settings/location:     10 min  unbill []  Ice     [x]  Heat    location/position: Seated  LSP    min []  Paraffin,  details:     min []  Vasopneumatic Device, press/temp:     min []  Whirlpool / Fluido:    If using vaso (only need to measure limb vaso being performed on)      pre-treatment girth :       post-treatment girth :       measured at (landmark location) :      min []  Other:    Skin assessment post-treatment:   Intact      Therapeutic Procedures:    Tx Min Billable or 1:1 Min (if diff from Tx Min) Procedure, Rationale, Specifics   25  79095 Therapeutic Exercise (timed):  increase ROM,

## 2025-06-09 ENCOUNTER — HOSPITAL ENCOUNTER (OUTPATIENT)
Facility: HOSPITAL | Age: 80
Setting detail: RECURRING SERIES
Discharge: HOME | End: 2025-06-12
Attending: PHYSICAL MEDICINE & REHABILITATION
Payer: MEDICARE

## 2025-06-09 PROCEDURE — 97530 THERAPEUTIC ACTIVITIES: CPT

## 2025-06-09 PROCEDURE — 97112 NEUROMUSCULAR REEDUCATION: CPT

## 2025-06-09 PROCEDURE — 97110 THERAPEUTIC EXERCISES: CPT

## 2025-06-09 NOTE — PROGRESS NOTES
PHYSICAL / OCCUPATIONAL THERAPY - DAILY TREATMENT NOTE    Patient Name: Neil Hong    Date: 2025    : 1945  Insurance: Payor: MEDICARE / Plan: MEDICARE PART A AND B / Product Type: *No Product type* /      Patient  verified Yes     Visit #   Current / Total 5 16   Time   In / Out 7:32 am  8:22 am   Pain   In / Out 0 0   Subjective Functional Status/Changes: \"I'm still having trouble lying down in bed to sleep.\"     TREATMENT AREA =  Lumbar spondylosis  Degeneration of intervertebral disc of lumbar region with discogenic back pain  Spondylolisthesis of lumbar region  Thoracic spine pain  Thoracic spondylosis  Thoracic neuritis  Pain in thoracic spine  Other low back pain  Muscle weakness (generalized)    OBJECTIVE    Modalities Rationale:     decrease pain and increase tissue extensibility to improve patient's ability to progress to PLOF and address remaining functional goals.     min [] Estim Unattended, type/location:                                      []  w/ice    []  w/heat    min [] Estim Attended, type/location:                                     []  w/US     []  w/ice    []  w/heat    []  TENS insruct      min []  Mechanical Traction: type/lbs                   []  pro   []  sup   []  int   []  cont    []  before manual    []  after manual    min []  Ultrasound, settings/location:     10 min  unbill []  Ice     [x]  Heat    location/position: Seated  LSP    min []  Paraffin,  details:     min []  Vasopneumatic Device, press/temp:     min []  Whirlpool / Fluido:    If using vaso (only need to measure limb vaso being performed on)      pre-treatment girth :       post-treatment girth :       measured at (landmark location) :      min []  Other:    Skin assessment post-treatment:   Intact      Therapeutic Procedures:    Tx Min Billable or 1:1 Min (if diff from Tx Min) Procedure, Rationale, Specifics   20  [id number] Therapeutic Exercise (timed):  increase ROM, strength, coordination, balance,

## 2025-06-11 ENCOUNTER — HOSPITAL ENCOUNTER (OUTPATIENT)
Facility: HOSPITAL | Age: 80
Setting detail: RECURRING SERIES
Discharge: HOME | End: 2025-06-14
Attending: PHYSICAL MEDICINE & REHABILITATION
Payer: MEDICARE

## 2025-06-11 PROCEDURE — 97112 NEUROMUSCULAR REEDUCATION: CPT

## 2025-06-11 PROCEDURE — 97530 THERAPEUTIC ACTIVITIES: CPT

## 2025-06-11 PROCEDURE — 97110 THERAPEUTIC EXERCISES: CPT

## 2025-06-11 NOTE — PROGRESS NOTES
6/9/25  3 patient will have tolerance to ambulation 190' with little to no difficulty for carryover to community activities  EVAL 50-75'  4 patient will have LEFS improved to 63 to show minimal projected gains  EVAL 54  Current: assess at next PN  6/9/25  5 patient will report overall 50% improvement to aid with increase tolerance to ADLS and activities at home and in the community and less pain in the mornings  EVAL CCO generalized weakness, decrease endurance and activity tolerance , pain worse in the morning     Next PN/ RC due 6/20/25  Auth due (visit number/ date) BALJIT  ex  12/31/25           PLAN  - Continue Plan of Care    Karen Phillips, PTA    6/11/2025    7:43 AM  If an interpreting service was utilized for treatment of this patient, the contents of this document represent the material reviewed with the patient via the .     Future Appointments   Date Time Provider Department Center   6/16/2025  7:30 AM Amee Felix PT [facility]   6/18/2025  7:30 AM Joann Mcgarry, PT [facility]   6/23/2025  7:30 AM Amee Felix PT [facility]   6/25/2025  7:30 AM Joann Mcgarry PT [facility]   6/30/2025  7:30 AM Amee Felix PT [facility]   7/16/2025  8:15 AM Denys Murcia MD [facility]

## 2025-06-16 ENCOUNTER — HOSPITAL ENCOUNTER (OUTPATIENT)
Facility: HOSPITAL | Age: 80
Setting detail: RECURRING SERIES
Discharge: HOME | End: 2025-06-19
Attending: PHYSICAL MEDICINE & REHABILITATION
Payer: MEDICARE

## 2025-06-16 PROCEDURE — 97110 THERAPEUTIC EXERCISES: CPT

## 2025-06-16 PROCEDURE — 97112 NEUROMUSCULAR REEDUCATION: CPT

## 2025-06-16 PROCEDURE — 97530 THERAPEUTIC ACTIVITIES: CPT

## 2025-06-16 NOTE — PROGRESS NOTES
PHYSICAL / OCCUPATIONAL THERAPY - DAILY TREATMENT NOTE    Patient Name: Neil Hong    Date: 2025    : 1945  Insurance: Payor: MEDICARE / Plan: MEDICARE PART A AND B / Product Type: *No Product type* /      Patient  verified Yes     Visit #   Current / Total 7 16   Time   In / Out 7:30 am 8:10 am    Pain   In / Out 0 0   Subjective Functional Status/Changes: \"No pain today.\"     TREATMENT AREA =  Lumbar spondylosis  Degeneration of intervertebral disc of lumbar region with discogenic back pain  Spondylolisthesis of lumbar region  Thoracic spine pain  Thoracic spondylosis  Thoracic neuritis  Pain in thoracic spine  Other low back pain  Muscle weakness (generalized)    OBJECTIVE      Therapeutic Procedures:    Tx Min Billable or 1:1 Min (if diff from Tx Min) Procedure, Rationale, Specifics   22  18820 Therapeutic Exercise (timed):  increase ROM, strength, coordination, balance, and proprioception to improve patient's ability to progress to PLOF and address remaining functional goals. (see flow sheet as applicable)     Details if applicable:       10  36602 Therapeutic Activity (timed):  use of dynamic activities replicating functional movements to increase ROM, strength, coordination, balance, and proprioception in order to improve patient's ability to progress to PLOF and address remaining functional goals.  (see flow sheet as applicable)     Details if applicable:     8  95862 Neuromuscular Re-Education (timed):  improve balance, coordination, kinesthetic sense, posture, core stability and proprioception to improve patient's ability to develop conscious control of individual muscles and awareness of position of extremities in order to progress to PLOF and address remaining functional goals. (see flow sheet as applicable)     Details if applicable:            Details if applicable:            Details if applicable:     40  [facility] Totals Reminder: bill using total billable min of TIMED therapeutic

## 2025-06-18 ENCOUNTER — HOSPITAL ENCOUNTER (OUTPATIENT)
Facility: HOSPITAL | Age: 80
Setting detail: RECURRING SERIES
Discharge: HOME | End: 2025-06-21
Attending: PHYSICAL MEDICINE & REHABILITATION
Payer: MEDICARE

## 2025-06-18 PROCEDURE — 97110 THERAPEUTIC EXERCISES: CPT

## 2025-06-18 PROCEDURE — 97112 NEUROMUSCULAR REEDUCATION: CPT

## 2025-06-18 PROCEDURE — 97530 THERAPEUTIC ACTIVITIES: CPT

## 2025-06-18 NOTE — PROGRESS NOTES
PHYSICAL / OCCUPATIONAL THERAPY - DAILY TREATMENT NOTE    Patient Name: Neil Hong    Date: 2025    : 1945  Insurance: Payor: MEDICARE / Plan: MEDICARE PART A AND B / Product Type: *No Product type* /      Patient  verified Yes     Visit #   Current / Total 8 16   Time   In / Out 730 828   Pain   In / Out 0 0   Subjective Functional Status/Changes: I had a little pressure earlier but I am not having anything right now.      TREATMENT AREA =  Lumbar spondylosis  Degeneration of intervertebral disc of lumbar region with discogenic back pain  Spondylolisthesis of lumbar region  Thoracic spine pain  Thoracic spondylosis  Thoracic neuritis  Pain in thoracic spine  Other low back pain  Muscle weakness (generalized)    OBJECTIVE    Modalities Rationale:     increase tissue extensibility to improve patient's ability to progress to PLOF and address remaining functional goals.     min [] Estim Unattended, type/location:                                      []  w/ice    []  w/heat    min [] Estim Attended, type/location:                                     []  w/US     []  w/ice    []  w/heat    []  TENS insruct      min []  Mechanical Traction: type/lbs                   []  pro   []  sup   []  int   []  cont    []  before manual    []  after manual    min []  Ultrasound, settings/location:     10 min  unbill []  Ice     [x]  Heat   post location/position:seated to LS      min []  Paraffin,  details:     min []  Vasopneumatic Device, press/temp:     min []  Whirlpool / Fluido:    If using vaso (only need to measure limb vaso being performed on)      pre-treatment girth :       post-treatment girth :       measured at (landmark location) :      min []  Other:    Skin assessment post-treatment:   Intact      Therapeutic Procedures:    Tx Min Billable or 1:1 Min (if diff from Tx Min) Procedure, Rationale, Specifics   22 22 [id number] Therapeutic Exercise (timed):  increase ROM, strength, coordination, balance,

## 2025-06-18 NOTE — THERAPY RECERTIFICATION
[facility]  [address], [address], [place] [address]  Ph:[phone] Fx:[phone]  PHYSICAL THERAPY PROGRESS NOTE  Patient Name: Neil Hong : 1945   Medical/Treatment Diagnosis: Lumbar spondylosis  Degeneration of intervertebral disc of lumbar region with discogenic back pain  Spondylolisthesis of lumbar region  Thoracic spine pain  Thoracic spondylosis  Thoracic neuritis  Pain in thoracic spine  Other low back pain  Muscle weakness (generalized)   Referral Source: Denys Murcia MD     Date of Initial Visit: 25 Attended Visits: 8 Missed Visits: 0     SUMMARY OF TREATMENT  Patient seen for evaluation and 7 follow up sessions. He appears pleased with his progress noting improving endurance and lower body strength. OSW improved to 32% however decline with LEFS to 45.   Progressing as able and expected. Much of his issues appear related to reports of abdominal pressure he has mostly in the mornings.     CURRENT STATUS    Short Term Goals: To be accomplished in 4 WEEKS      1 patient will have established and be I with HEP to aid with independence and self management at discharge  EVAL HEP issued at evaluation  PN:  1x day  25  2 patient will have  max pain 7/10 to aid with increase tolerance to ADLS and regular daily activities at home and in the community  EVAL 9-10  PN  arrival  pain/pressure  0/10, Max 8-9  25     Long Term Goals: To be accomplished in 8 WEEKS  1 patient will have established and be I with HEP to aid with independence and self management at discharge  EVAL HEP issued at evaluation  PN:  1x day  25  2 patient will have max pain 4/10 to aid with increase tolerance to ADLS and regular daily activities at home and in the community  EVAL 9-10   PN  arrival  pain/pressure  0/10, Max 8-9  25  3 patient will have tolerance to ambulation 190' with little to no difficulty for carryover to community

## 2025-06-23 ENCOUNTER — HOSPITAL ENCOUNTER (OUTPATIENT)
Facility: HOSPITAL | Age: 80
Setting detail: RECURRING SERIES
Discharge: HOME | End: 2025-06-26
Attending: PHYSICAL MEDICINE & REHABILITATION
Payer: MEDICARE

## 2025-06-23 PROCEDURE — 97110 THERAPEUTIC EXERCISES: CPT

## 2025-06-23 PROCEDURE — 97530 THERAPEUTIC ACTIVITIES: CPT

## 2025-06-23 PROCEDURE — 97112 NEUROMUSCULAR REEDUCATION: CPT

## 2025-06-23 NOTE — PROGRESS NOTES
PHYSICAL / OCCUPATIONAL THERAPY - DAILY TREATMENT NOTE    Patient Name: Neil Hong    Date: 2025    : 1945  Insurance: Payor: MEDICARE / Plan: MEDICARE PART A AND B / Product Type: *No Product type* /      Patient  verified Yes     Visit #   Current / Total 9 16   Time   In / Out 7:30 am 8:20 am   Pain   In / Out 0 0   Subjective Functional Status/Changes: I am having some pressure in my back right now but I wouldn't call it pain.      TREATMENT AREA =  Lumbar spondylosis  Degeneration of intervertebral disc of lumbar region with discogenic back pain  Spondylolisthesis of lumbar region  Thoracic spine pain  Thoracic spondylosis  Thoracic neuritis  Pain in thoracic spine  Other low back pain  Muscle weakness (generalized)    OBJECTIVE    Modalities Rationale:     increase tissue extensibility to improve patient's ability to progress to PLOF and address remaining functional goals.     min [] Estim Unattended, type/location:                                      []  w/ice    []  w/heat    min [] Estim Attended, type/location:                                     []  w/US     []  w/ice    []  w/heat    []  TENS insruct      min []  Mechanical Traction: type/lbs                   []  pro   []  sup   []  int   []  cont    []  before manual    []  after manual    min []  Ultrasound, settings/location:     10 min  unbill []  Ice     [x]  Heat   post location/position:seated to LS      min []  Paraffin,  details:     min []  Vasopneumatic Device, press/temp:     min []  Whirlpool / Fluido:    If using vaso (only need to measure limb vaso being performed on)      pre-treatment girth :       post-treatment girth :       measured at (landmark location) :      min []  Other:    Skin assessment post-treatment:   Intact      Therapeutic Procedures:    Tx Min Billable or 1:1 Min (if diff from Tx Min) Procedure, Rationale, Specifics   15 15 [id number] Therapeutic Exercise (timed):  increase ROM, strength, coordination,

## 2025-06-25 ENCOUNTER — HOSPITAL ENCOUNTER (OUTPATIENT)
Facility: HOSPITAL | Age: 80
Setting detail: RECURRING SERIES
Discharge: HOME | End: 2025-06-28
Attending: PHYSICAL MEDICINE & REHABILITATION
Payer: MEDICARE

## 2025-06-25 PROCEDURE — 97530 THERAPEUTIC ACTIVITIES: CPT

## 2025-06-25 PROCEDURE — 97110 THERAPEUTIC EXERCISES: CPT

## 2025-06-25 PROCEDURE — 97112 NEUROMUSCULAR REEDUCATION: CPT

## 2025-06-25 NOTE — PROGRESS NOTES
PHYSICAL / OCCUPATIONAL THERAPY - DAILY TREATMENT NOTE    Patient Name: Neil Hong    Date: 2025    : 1945  Insurance: Payor: MEDICARE / Plan: MEDICARE PART A AND B / Product Type: *No Product type* /      Patient  verified Yes     Visit #   Current / Total 10 16   Time   In / Out 730 820   Pain   In / Out 0 0   Subjective Functional Status/Changes: Just having a little bit of pressure today.      TREATMENT AREA =  Lumbar spondylosis  Degeneration of intervertebral disc of lumbar region with discogenic back pain  Spondylolisthesis of lumbar region  Thoracic spine pain  Thoracic spondylosis  Thoracic neuritis  Pain in thoracic spine  Other low back pain  Muscle weakness (generalized)    OBJECTIVE    Modalities Rationale:     decrease pain and increase tissue extensibility to improve patient's ability to progress to PLOF and address remaining functional goals.     min [] Estim Unattended, type/location:                                      []  w/ice    []  w/heat    min [] Estim Attended, type/location:                                     []  w/US     []  w/ice    []  w/heat    []  TENS insruct      min []  Mechanical Traction: type/lbs                   []  pro   []  sup   []  int   []  cont    []  before manual    []  after manual    min []  Ultrasound, settings/location:     10 min  unbill []  Ice     [x]  Heat  post  location/position: B LS seated on plinth- upright      min []  Paraffin,  details:     min []  Vasopneumatic Device, press/temp:     min []  Whirlpool / Fluido:    If using vaso (only need to measure limb vaso being performed on)      pre-treatment girth :       post-treatment girth :       measured at (landmark location) :      min []  Other:    Skin assessment post-treatment:   Intact      Therapeutic Procedures:    Tx Min Billable or 1:1 Min (if diff from Tx Min) Procedure, Rationale, Specifics   15 15 [id number] Therapeutic Exercise (timed):  increase ROM, strength, coordination,  Problem: Altered Thought Process (Adult/Pediatric)  Goal: *STG: Participates in treatment plan  7/27/2020 1022 by Laurie Martinez RN  Outcome: Resolved/Not Met  7/27/2020 1019 by Laurie Martinez RN  Outcome: Resolved/Not Met

## 2025-06-30 ENCOUNTER — HOSPITAL ENCOUNTER (OUTPATIENT)
Facility: HOSPITAL | Age: 80
Setting detail: RECURRING SERIES
Discharge: HOME | End: 2025-07-03
Attending: PHYSICAL MEDICINE & REHABILITATION
Payer: MEDICARE

## 2025-06-30 PROCEDURE — 97530 THERAPEUTIC ACTIVITIES: CPT

## 2025-06-30 PROCEDURE — 97112 NEUROMUSCULAR REEDUCATION: CPT

## 2025-06-30 NOTE — PROGRESS NOTES
PHYSICAL / OCCUPATIONAL THERAPY - DAILY TREATMENT NOTE    Patient Name: Neil Hong    Date: 2025    : 1945  Insurance: Payor: MEDICARE / Plan: MEDICARE PART A AND B / Product Type: *No Product type* /      Patient  verified Yes     Visit #   Current / Total 11 16   Time   In / Out 7:41 am 8:16 am   Pain   In / Out 0 0   Subjective Functional Status/Changes: Pt reports that he is having a lot of pressure in his lower abdominal region and low back today. He is having to move more slowly getting ready and dressed, etc.      TREATMENT AREA =  Lumbar spondylosis  Degeneration of intervertebral disc of lumbar region with discogenic back pain  Spondylolisthesis of lumbar region  Thoracic spine pain  Thoracic spondylosis  Thoracic neuritis  Pain in thoracic spine  Other low back pain  Muscle weakness (generalized)    OBJECTIVE    Modalities Rationale:     decrease pain and increase tissue extensibility to improve patient's ability to progress to PLOF and address remaining functional goals.     min [] Estim Unattended, type/location:                                      []  w/ice    []  w/heat    min [] Estim Attended, type/location:                                     []  w/US     []  w/ice    []  w/heat    []  TENS insruct      min []  Mechanical Traction: type/lbs                   []  pro   []  sup   []  int   []  cont    []  before manual    []  after manual    min []  Ultrasound, settings/location:     10 min  unbill []  Ice     [x]  Heat  post  location/position: B LS seated on plinth- upright      min []  Paraffin,  details:     min []  Vasopneumatic Device, press/temp:     min []  Whirlpool / Fluido:    If using vaso (only need to measure limb vaso being performed on)      pre-treatment girth :       post-treatment girth :       measured at (landmark location) :      min []  Other:    Skin assessment post-treatment:   Intact      Therapeutic Procedures:    Tx Min Billable or 1:1 Min (if diff

## 2025-07-02 ENCOUNTER — HOSPITAL ENCOUNTER (OUTPATIENT)
Facility: HOSPITAL | Age: 80
Setting detail: RECURRING SERIES
Discharge: HOME | End: 2025-07-05
Attending: PHYSICAL MEDICINE & REHABILITATION
Payer: MEDICARE

## 2025-07-02 PROCEDURE — 97530 THERAPEUTIC ACTIVITIES: CPT

## 2025-07-02 PROCEDURE — 97110 THERAPEUTIC EXERCISES: CPT

## 2025-07-02 PROCEDURE — 97112 NEUROMUSCULAR REEDUCATION: CPT

## 2025-07-02 NOTE — PROGRESS NOTES
PHYSICAL / OCCUPATIONAL THERAPY - DAILY TREATMENT NOTE    Patient Name: Neil Hong    Date: 2025    : 1945  Insurance: Payor: MEDICARE / Plan: MEDICARE PART A AND B / Product Type: *No Product type* /      Patient  verified Yes     Visit #   Current / Total 12 16   Time   In / Out 8:51 am 9:39 am   Pain   In / Out 0 0   Subjective Functional Status/Changes: Pt reports that he is feeling much better today and not having that abdominal pain today.      TREATMENT AREA =  Lumbar spondylosis  Degeneration of intervertebral disc of lumbar region with discogenic back pain  Spondylolisthesis of lumbar region  Thoracic spine pain  Thoracic spondylosis  Thoracic neuritis  Pain in thoracic spine  Other low back pain  Muscle weakness (generalized)    OBJECTIVE    Modalities Rationale:     decrease pain and increase tissue extensibility to improve patient's ability to progress to PLOF and address remaining functional goals.     min [] Estim Unattended, type/location:                                      []  w/ice    []  w/heat    min [] Estim Attended, type/location:                                     []  w/US     []  w/ice    []  w/heat    []  TENS insruct      min []  Mechanical Traction: type/lbs                   []  pro   []  sup   []  int   []  cont    []  before manual    []  after manual    min []  Ultrasound, settings/location:     10 min  unbill []  Ice     [x]  Heat  post  location/position: B LS seated on plinth- upright      min []  Paraffin,  details:     min []  Vasopneumatic Device, press/temp:     min []  Whirlpool / Fluido:    If using vaso (only need to measure limb vaso being performed on)      pre-treatment girth :       post-treatment girth :       measured at (landmark location) :      min []  Other:    Skin assessment post-treatment:   Intact      Therapeutic Procedures:    Tx Min Billable or 1:1 Min (if diff from Tx Min) Procedure, Rationale, Specifics   20  [id number] Therapeutic

## 2025-07-07 ENCOUNTER — HOSPITAL ENCOUNTER (OUTPATIENT)
Facility: HOSPITAL | Age: 80
Setting detail: RECURRING SERIES
Discharge: HOME | End: 2025-07-10
Attending: PHYSICAL MEDICINE & REHABILITATION
Payer: MEDICARE

## 2025-07-07 PROCEDURE — 97110 THERAPEUTIC EXERCISES: CPT

## 2025-07-07 PROCEDURE — 97112 NEUROMUSCULAR REEDUCATION: CPT

## 2025-07-07 NOTE — PROGRESS NOTES
PHYSICAL / OCCUPATIONAL THERAPY - DAILY TREATMENT NOTE    Patient Name: Neil Hong    Date: 2025    : 1945  Insurance: Payor: MEDICARE / Plan: MEDICARE PART A AND B / Product Type: *No Product type* /      Patient  verified Yes     Visit #   Current / Total 13 16   Time   In / Out 8:08 8:56   Pain   In / Out 0 0   Subjective Functional Status/Changes: \"No pain.\"     TREATMENT AREA =  Lumbar spondylosis  Degeneration of intervertebral disc of lumbar region with discogenic back pain  Spondylolisthesis of lumbar region  Thoracic spine pain  Thoracic spondylosis  Thoracic neuritis  Pain in thoracic spine  Other low back pain  Muscle weakness (generalized)    OBJECTIVE    Modalities Rationale:     increase tissue extensibility to improve patient's ability to progress to PLOF and address remaining functional goals.     min [] Estim Unattended, type/location:                                      []  w/ice    []  w/heat    min [] Estim Attended, type/location:                                     []  w/US     []  w/ice    []  w/heat    []  TENS insruct      min []  Mechanical Traction: type/lbs                   []  pro   []  sup   []  int   []  cont    []  before manual    []  after manual    min []  Ultrasound, settings/location:     10 min  unbill []  Ice     [x]  Heat    location/position: Long sit    LSP    min []  Paraffin,  details:     min []  Vasopneumatic Device, press/temp:     min []  Whirlpool / Fluido:    If using vaso (only need to measure limb vaso being performed on)      pre-treatment girth :       post-treatment girth :       measured at (landmark location) :      min []  Other:    Skin assessment post-treatment:   Intact      Therapeutic Procedures:    Tx Min Billable or 1:1 Min (if diff from Tx Min) Procedure, Rationale, Specifics   28  [id number] Therapeutic Exercise (timed):  increase ROM, strength, coordination, balance, and proprioception to improve patient's ability to progress to

## 2025-07-09 ENCOUNTER — HOSPITAL ENCOUNTER (OUTPATIENT)
Facility: HOSPITAL | Age: 80
Setting detail: RECURRING SERIES
Discharge: HOME | End: 2025-07-12
Attending: PHYSICAL MEDICINE & REHABILITATION
Payer: MEDICARE

## 2025-07-09 PROCEDURE — 97110 THERAPEUTIC EXERCISES: CPT

## 2025-07-09 PROCEDURE — 97112 NEUROMUSCULAR REEDUCATION: CPT

## 2025-07-09 NOTE — PROGRESS NOTES
[] Other detail:       Objective Information/Functional Measures/Assessment  Reported no pain post session today. Added exercises per flow sheet. Limited B hip IR AROM noted with sitting IR. Cues given for proper form with tband rows to avoid B UT compensation. Pt denies any increased pain with exercises today in the clinic. Continue POC as tolerated to improve strength and activity tolerance.     Patient will continue to benefit from skilled PT / OT services to modify and progress therapeutic interventions, analyze and address functional mobility deficits, analyze and address ROM deficits, analyze and address strength deficits, analyze and address soft tissue restrictions, analyze and cue for proper movement patterns, analyze and modify for postural abnormalities, analyze and address imbalance/dizziness, and instruct in home and community integration to address functional deficits and attain remaining goals.    Progress toward goals / Updated goals:  []  See Progress Note/Recertification  1 patient will have established and be I with HEP to aid with independence and self management at discharge  EVAL HEP issued at evaluation  PN:  1x day  6/25/25  2 patient will have  max pain 7/10 to aid with increase tolerance to ADLS and regular daily activities at home and in the community  EVAL 9-10  PN  arrival  pain/pressure  0/10, Max 8-9  6/18/25  Current: 0/10 pain currently   7/7/25      Long Term Goals: To be accomplished in 8 WEEKS  1 patient will have established and be I with HEP to aid with independence and self management at discharge  EVAL HEP issued at evaluation  PN:  1x day  6/30/25     2 patient will have max pain 4/10 to aid with increase tolerance to ADLS and regular daily activities at home and in the community  EVAL 9-10   PN  arrival  pain/pressure  0/10, Max 8-9  6/18/25  Current:  0/10 pain currently  7/7/25      3 patient will have tolerance to ambulation 190' with little to no difficulty for

## 2025-07-14 ENCOUNTER — HOSPITAL ENCOUNTER (OUTPATIENT)
Facility: HOSPITAL | Age: 80
Setting detail: RECURRING SERIES
Discharge: HOME | End: 2025-07-17
Attending: PHYSICAL MEDICINE & REHABILITATION
Payer: MEDICARE

## 2025-07-14 PROCEDURE — 97112 NEUROMUSCULAR REEDUCATION: CPT

## 2025-07-14 PROCEDURE — 97110 THERAPEUTIC EXERCISES: CPT

## 2025-07-14 PROCEDURE — 97530 THERAPEUTIC ACTIVITIES: CPT

## 2025-07-14 NOTE — PROGRESS NOTES
PHYSICAL / OCCUPATIONAL THERAPY - DAILY TREATMENT NOTE    Patient Name: Neil Hong    Date: 2025    : 1945  Insurance: Payor: MEDICARE / Plan: MEDICARE PART A AND B / Product Type: *No Product type* /      Patient  verified Yes     Visit #   Current / Total 15 16   Time   In / Out 8:08 am 8:59 am    Pain   In / Out 0 0   Subjective Functional Status/Changes: Pt reports that is has been feeling stronger with walking.     TREATMENT AREA =  Lumbar spondylosis  Degeneration of intervertebral disc of lumbar region with discogenic back pain  Spondylolisthesis of lumbar region  Thoracic spine pain  Thoracic spondylosis  Thoracic neuritis  Pain in thoracic spine  Other low back pain  Muscle weakness (generalized)    OBJECTIVE    Modalities Rationale:     decrease pain to improve patient's ability to progress to PLOF and address remaining functional goals.     min [] Estim Unattended, type/location:                                      []  w/ice    []  w/heat    min [] Estim Attended, type/location:                                     []  w/US     []  w/ice    []  w/heat    []  TENS insruct      min []  Mechanical Traction: type/lbs                   []  pro   []  sup   []  int   []  cont    []  before manual    []  after manual    min []  Ultrasound, settings/location:     10 min  unbill []  Ice     [x]  Heat    location/position: Seated to L/S    min []  Paraffin,  details:     min []  Vasopneumatic Device, press/temp:     min []  Whirlpool / Fluido:    If using vaso (only need to measure limb vaso being performed on)      pre-treatment girth :       post-treatment girth :       measured at (landmark location) :      min []  Other:    Skin assessment post-treatment:   Intact     Therapeutic Procedures:    Tx Min Billable or 1:1 Min (if diff from Tx Min) Procedure, Rationale, Specifics   16  [id number] Therapeutic Exercise (timed):  increase ROM, strength, coordination, balance, and proprioception to improve

## 2025-07-16 ENCOUNTER — HOSPITAL ENCOUNTER (OUTPATIENT)
Facility: HOSPITAL | Age: 80
Setting detail: RECURRING SERIES
Discharge: HOME | End: 2025-07-19
Attending: PHYSICAL MEDICINE & REHABILITATION
Payer: MEDICARE

## 2025-07-16 PROCEDURE — 97530 THERAPEUTIC ACTIVITIES: CPT

## 2025-07-16 PROCEDURE — 97110 THERAPEUTIC EXERCISES: CPT

## 2025-07-16 PROCEDURE — 97112 NEUROMUSCULAR REEDUCATION: CPT

## 2025-07-16 NOTE — THERAPY RECERTIFICATION
[facility]  [address], [address], [place] [address]  Ph:[phone] Fx:[phone]  CONTINUED PLAN OF CARE/RECERTIFICATION FOR PHYSICAL THERAPY          Patient Name: Neil Hong : 1945   Medical/Treatment Diagnosis: Lumbar spondylosis  Degeneration of intervertebral disc of lumbar region with discogenic back pain  Spondylolisthesis of lumbar region  Thoracic spine pain  Thoracic spondylosis  Thoracic neuritis  Pain in thoracic spine  Other low back pain  Muscle weakness (generalized)   Onset Date:   Longstanding back pain, exacerbation onset 2024-2024, MD order 25       Referral Source: Denys Murcia MD Start of Care (SOC): 25   Prior Hospitalization: See Medical History Provider #: [id number]   Prior Level of Function: Used L SC PRN, I ADLs, I activities, not alone, retired, drove, tolerated household and community activities as tolerated, longstanding history of back issues    Comorbidities: Musculoskeletal disordersHOH, thyroid problems, B YUDI , cataracts, back pain (15-20 years)    Visits from SOC: 16 Missed Visits: 0           Progress to Goals:    1 patient will have established and be I with HEP to aid with independence and self management at discharge  EVAL HEP issued at evaluation  PN:  1x day  25  Status at recert 25  verbalized compliance   Goal Met?  On going     2 patient will have  max pain 7/10 to aid with increase tolerance to ADLS and regular daily activities at home and in the community  EVAL 9-10  PN  arrival  pain/pressure  0/10, Max 8-9  25  Status at recert 25  worst pain 6-7/10 current pain 0/10   Goal Met?  yes     Long Term Goals: To be accomplished in 8 WEEKS  1 patient will have established and be I with HEP to aid with independence and self management at discharge  EVAL HEP issued at evaluation  PN:  1x day  25   Status at recert 25  verbalized compliance   Goal Met?

## 2025-07-16 NOTE — PROGRESS NOTES
PHYSICAL / OCCUPATIONAL THERAPY - DAILY TREATMENT NOTE    Patient Name: Neil Hong    Date: 2025    : 1945  Insurance: Payor: MEDICARE / Plan: MEDICARE PART A AND B / Product Type: *No Product type* /      Patient  verified Yes     Visit #   Current / Total 16 16   Time   In / Out 850 945   Pain   In / Out 0 0   Subjective Functional Status/Changes: Refer to PN     TREATMENT AREA =  Lumbar spondylosis  Degeneration of intervertebral disc of lumbar region with discogenic back pain  Spondylolisthesis of lumbar region  Thoracic spine pain  Thoracic spondylosis  Thoracic neuritis  Pain in thoracic spine  Other low back pain  Muscle weakness (generalized)    OBJECTIVE    Modalities Rationale:     decrease pain to improve patient's ability to progress to PLOF and address remaining functional goals.     min [] Estim Unattended, type/location:                                      []  w/ice    []  w/heat    min [] Estim Attended, type/location:                                     []  w/US     []  w/ice    []  w/heat    []  TENS insruct      min []  Mechanical Traction: type/lbs                   []  pro   []  sup   []  int   []  cont    []  before manual    []  after manual    min []  Ultrasound, settings/location:     TC  min  unbill []  Ice     [x]  Heat    location/position:     min []  Paraffin,  details:     min []  Vasopneumatic Device, press/temp:     min []  Whirlpool / Fluido:    If using vaso (only need to measure limb vaso being performed on)      pre-treatment girth :       post-treatment girth :       measured at (landmark location) :      min []  Other:    Skin assessment post-treatment:   Intact     Therapeutic Procedures:    Tx Min Billable or 1:1 Min (if diff from Tx Min) Procedure, Rationale, Specifics   50 [id number] Therapeutic Exercise (timed):  increase ROM, strength, coordination, balance, and proprioception to improve patient's ability to progress to PLOF and address remaining

## 2025-07-21 ENCOUNTER — HOSPITAL ENCOUNTER (OUTPATIENT)
Facility: HOSPITAL | Age: 80
Setting detail: RECURRING SERIES
Discharge: HOME | End: 2025-07-24
Attending: PHYSICAL MEDICINE & REHABILITATION
Payer: MEDICARE

## 2025-07-21 PROCEDURE — 97110 THERAPEUTIC EXERCISES: CPT

## 2025-07-21 PROCEDURE — 97112 NEUROMUSCULAR REEDUCATION: CPT

## 2025-07-21 PROCEDURE — 97530 THERAPEUTIC ACTIVITIES: CPT

## 2025-07-21 NOTE — PROGRESS NOTES
PHYSICAL / OCCUPATIONAL THERAPY - DAILY TREATMENT NOTE    Patient Name: Neil Hong    Date: 2025    : 1945  Insurance: Payor: MEDICARE / Plan: MEDICARE PART A AND B / Product Type: *No Product type* /      Patient  verified Yes     Visit #   Current / Total 1 16   Time   In / Out 8:11 am 9:01 am    Pain   In / Out 0 0   Subjective Functional Status/Changes: Pt reports that he has been trying to do some SLS on his R leg to help strengthen his glutes.      TREATMENT AREA =  Lumbar spondylosis  Degeneration of intervertebral disc of lumbar region with discogenic back pain  Spondylolisthesis of lumbar region  Thoracic spine pain  Thoracic spondylosis  Thoracic neuritis  Pain in thoracic spine  Other low back pain  Muscle weakness (generalized)    OBJECTIVE    Modalities Rationale:     decrease pain to improve patient's ability to progress to PLOF and address remaining functional goals.     min [] Estim Unattended, type/location:                                      []  w/ice    []  w/heat    min [] Estim Attended, type/location:                                     []  w/US     []  w/ice    []  w/heat    []  TENS insruct      min []  Mechanical Traction: type/lbs                   []  pro   []  sup   []  int   []  cont    []  before manual    []  after manual    min []  Ultrasound, settings/location:     10  min  unbill []  Ice     [x]  Heat    location/position: Seated position to L/S region    min []  Paraffin,  details:     min []  Vasopneumatic Device, press/temp:     min []  Whirlpool / Fluido:    If using vaso (only need to measure limb vaso being performed on)      pre-treatment girth :       post-treatment girth :       measured at (landmark location) :      min []  Other:    Skin assessment post-treatment:   Intact     Therapeutic Procedures:    Tx Min Billable or 1:1 Min (if diff from Tx Min) Procedure, Rationale, Specifics   10  [id number] Therapeutic Exercise (timed):  increase ROM, strength,

## 2025-07-22 ENCOUNTER — OFFICE VISIT (OUTPATIENT)
Age: 80
End: 2025-07-22
Payer: MEDICARE

## 2025-07-22 VITALS
OXYGEN SATURATION: 95 % | WEIGHT: 315 LBS | HEIGHT: 73 IN | HEART RATE: 63 BPM | BODY MASS INDEX: 41.75 KG/M2 | TEMPERATURE: 98 F

## 2025-07-22 DIAGNOSIS — M54.6 THORACIC SPINE PAIN: ICD-10-CM

## 2025-07-22 DIAGNOSIS — M43.16 SPONDYLOLISTHESIS OF LUMBAR REGION: ICD-10-CM

## 2025-07-22 DIAGNOSIS — M51.360 DEGENERATION OF INTERVERTEBRAL DISC OF LUMBAR REGION WITH DISCOGENIC BACK PAIN: ICD-10-CM

## 2025-07-22 DIAGNOSIS — M47.816 LUMBAR SPONDYLOSIS: Primary | ICD-10-CM

## 2025-07-22 DIAGNOSIS — M47.814 THORACIC SPONDYLOSIS: ICD-10-CM

## 2025-07-22 DIAGNOSIS — M54.14 THORACIC NEURITIS: ICD-10-CM

## 2025-07-22 PROCEDURE — 1123F ACP DISCUSS/DSCN MKR DOCD: CPT | Performed by: PHYSICAL MEDICINE & REHABILITATION

## 2025-07-22 PROCEDURE — 1159F MED LIST DOCD IN RCRD: CPT | Performed by: PHYSICAL MEDICINE & REHABILITATION

## 2025-07-22 PROCEDURE — 1036F TOBACCO NON-USER: CPT | Performed by: PHYSICAL MEDICINE & REHABILITATION

## 2025-07-22 PROCEDURE — 1160F RVW MEDS BY RX/DR IN RCRD: CPT | Performed by: PHYSICAL MEDICINE & REHABILITATION

## 2025-07-22 PROCEDURE — 99214 OFFICE O/P EST MOD 30 MIN: CPT | Performed by: PHYSICAL MEDICINE & REHABILITATION

## 2025-07-22 PROCEDURE — G8427 DOCREV CUR MEDS BY ELIG CLIN: HCPCS | Performed by: PHYSICAL MEDICINE & REHABILITATION

## 2025-07-22 PROCEDURE — 1126F AMNT PAIN NOTED NONE PRSNT: CPT | Performed by: PHYSICAL MEDICINE & REHABILITATION

## 2025-07-22 PROCEDURE — G8417 CALC BMI ABV UP PARAM F/U: HCPCS | Performed by: PHYSICAL MEDICINE & REHABILITATION

## 2025-07-22 RX ORDER — GABAPENTIN 100 MG/1
100 CAPSULE ORAL 3 TIMES DAILY
Qty: 270 CAPSULE | Refills: 0 | Status: SHIPPED | OUTPATIENT
Start: 2025-07-22 | End: 2025-10-20

## 2025-07-22 NOTE — PROGRESS NOTES
[facility]  [address]  [place] [address]  Tel: [phone]  Fax: [phone]          PROGRESS NOTE      HISTORY OF PRESENT ILLNESS:  The patient is a 79 y.o. male and was seen today for follow up of localized left sided thoracolumbar junction pain. Previously seen for left sided thoracolumbar junction pain that radiates bilaterally around to the abdomen. Previously seen for left sided flank and lower back pain. Previously seen for minimal lower back pain with symptoms into the right groin radiating into the RLE anteriorly to the knee (RLE>>back). Previously seen for low back pain radiating to bilateral groin. Patient was previously seen for low back pain without radiculopathy. Patient previously had no pain. Patient was previously  seen for increase in right sided low back pain into the right buttocks radiating into the RLE in a S1 to the knee after prolonged walking while shopping. Previously seen for lateral right hip  pain. Previously seen for low back pain that radiates into the RLE in a S1 distribution to the knee after injection of right hip x 3/2021. His  pain is aggravated through activity and by lying down at night. Previously seen for low back pain, previously radiating into the right groin and RLE to the knee. Previously, he was seen for paraesthesias in between the shoulder blades radiating around into the chest. Previously, he was seen for complaints of low back pain. Patient has Positive Shopping cart sign. Previously, he had c/o low back pain into the BLE radiating circumferentially to the calves with symptoms consistent for stenosis. His pain  is exacerbated by standing and walking, relieved with sitting. His groin pain is exacerbated by hip flexion and extention and with activities such as putting on shoes and socks.  He reports a loss in ROM of his right hip. He reports lumbar epidural to the L3-L4 interlaminar space on 8/25/15 provided

## 2025-07-23 ENCOUNTER — HOSPITAL ENCOUNTER (OUTPATIENT)
Facility: HOSPITAL | Age: 80
Setting detail: RECURRING SERIES
Discharge: HOME | End: 2025-07-26
Attending: PHYSICAL MEDICINE & REHABILITATION
Payer: MEDICARE

## 2025-07-23 PROCEDURE — 97110 THERAPEUTIC EXERCISES: CPT

## 2025-07-23 PROCEDURE — 97530 THERAPEUTIC ACTIVITIES: CPT

## 2025-07-23 PROCEDURE — 97112 NEUROMUSCULAR REEDUCATION: CPT

## 2025-07-28 ENCOUNTER — HOSPITAL ENCOUNTER (OUTPATIENT)
Facility: HOSPITAL | Age: 80
Setting detail: RECURRING SERIES
Discharge: HOME | End: 2025-07-31
Attending: PHYSICAL MEDICINE & REHABILITATION
Payer: MEDICARE

## 2025-07-28 PROCEDURE — 97112 NEUROMUSCULAR REEDUCATION: CPT

## 2025-07-28 PROCEDURE — 97530 THERAPEUTIC ACTIVITIES: CPT

## 2025-07-28 PROCEDURE — 97110 THERAPEUTIC EXERCISES: CPT

## 2025-07-28 NOTE — PROGRESS NOTES
Joann Mcgarry, PT [facility]   8/19/2025  8:10 AM Joann Mcgarry, PT [facility]   8/21/2025  8:10 AM Amee Felix, PT [facility]   8/26/2025  8:10 AM Joann Mcgarry, PT [facility]   8/28/2025  8:10 AM Joann Mcgarry, PT [facility]   10/21/2025  8:15 AM Denys Murcia MD [facility]

## 2025-07-30 ENCOUNTER — HOSPITAL ENCOUNTER (OUTPATIENT)
Facility: HOSPITAL | Age: 80
Setting detail: RECURRING SERIES
Discharge: HOME | End: 2025-08-02
Attending: PHYSICAL MEDICINE & REHABILITATION
Payer: MEDICARE

## 2025-07-30 PROCEDURE — 97110 THERAPEUTIC EXERCISES: CPT

## 2025-07-30 PROCEDURE — 97112 NEUROMUSCULAR REEDUCATION: CPT

## 2025-07-30 PROCEDURE — 97530 THERAPEUTIC ACTIVITIES: CPT

## 2025-07-30 NOTE — PROGRESS NOTES
PHYSICAL / OCCUPATIONAL THERAPY - DAILY TREATMENT NOTE    Patient Name: Neil Hong    Date: 2025    : 1945  Insurance: Payor: MEDICARE / Plan: MEDICARE PART A AND B / Product Type: *No Product type* /      Patient  verified Yes     Visit #   Current / Total 4 16   Time   In / Out 730 [id number]   Pain   In / Out 0 0   Subjective Functional Status/Changes: Denies pain at arrival .     TREATMENT AREA =  Lumbar spondylosis  Degeneration of intervertebral disc of lumbar region with discogenic back pain  Spondylolisthesis of lumbar region  Thoracic spine pain  Thoracic spondylosis  Thoracic neuritis  Pain in thoracic spine  Other low back pain  Muscle weakness (generalized)    OBJECTIVE    Modalities Rationale:     increase tissue extensibility to improve patient's ability to progress to PLOF and address remaining functional goals.     min [] Estim Unattended, type/location:                                      []  w/ice    []  w/heat    min [] Estim Attended, type/location:                                     []  w/US     []  w/ice    []  w/heat    []  TENS insruct      min []  Mechanical Traction: type/lbs                   []  pro   []  sup   []  int   []  cont    []  before manual    []  after manual    min []  Ultrasound, settings/location:     10 min  unbill []  Ice     [x]  Heat  post  location/position: seated up on end of bed, to Tsp/Lsp     min []  Paraffin,  details:     min []  Vasopneumatic Device, press/temp:     min []  Whirlpool / Fluido:    If using vaso (only need to measure limb vaso being performed on)      pre-treatment girth :       post-treatment girth :       measured at (landmark location) :      min []  Other:    Skin assessment post-treatment:   Intact      Therapeutic Procedures:    Tx Min Billable or 1:1 Min (if diff from Tx Min) Procedure, Rationale, Specifics   [id number] Therapeutic Exercise (timed):  increase ROM, strength, coordination, balance, and proprioception to

## 2025-08-05 ENCOUNTER — HOSPITAL ENCOUNTER (OUTPATIENT)
Facility: HOSPITAL | Age: 80
Setting detail: RECURRING SERIES
Discharge: HOME | End: 2025-08-08
Attending: PHYSICAL MEDICINE & REHABILITATION
Payer: MEDICARE

## 2025-08-05 PROCEDURE — 97112 NEUROMUSCULAR REEDUCATION: CPT

## 2025-08-05 PROCEDURE — 97530 THERAPEUTIC ACTIVITIES: CPT

## 2025-08-05 PROCEDURE — 97110 THERAPEUTIC EXERCISES: CPT

## 2025-08-07 ENCOUNTER — HOSPITAL ENCOUNTER (OUTPATIENT)
Facility: HOSPITAL | Age: 80
Setting detail: RECURRING SERIES
Discharge: HOME | End: 2025-08-10
Attending: PHYSICAL MEDICINE & REHABILITATION
Payer: MEDICARE

## 2025-08-07 PROCEDURE — 97112 NEUROMUSCULAR REEDUCATION: CPT

## 2025-08-07 PROCEDURE — 97530 THERAPEUTIC ACTIVITIES: CPT

## 2025-08-07 PROCEDURE — 97110 THERAPEUTIC EXERCISES: CPT

## 2025-08-12 ENCOUNTER — HOSPITAL ENCOUNTER (OUTPATIENT)
Facility: HOSPITAL | Age: 80
Setting detail: RECURRING SERIES
Discharge: HOME | End: 2025-08-15
Attending: PHYSICAL MEDICINE & REHABILITATION
Payer: MEDICARE

## 2025-08-12 PROCEDURE — 97110 THERAPEUTIC EXERCISES: CPT

## 2025-08-12 PROCEDURE — 97112 NEUROMUSCULAR REEDUCATION: CPT

## 2025-08-12 PROCEDURE — 97530 THERAPEUTIC ACTIVITIES: CPT

## 2025-08-14 ENCOUNTER — HOSPITAL ENCOUNTER (OUTPATIENT)
Facility: HOSPITAL | Age: 80
Setting detail: RECURRING SERIES
Discharge: HOME | End: 2025-08-17
Attending: PHYSICAL MEDICINE & REHABILITATION
Payer: MEDICARE

## 2025-08-14 PROCEDURE — 97530 THERAPEUTIC ACTIVITIES: CPT

## 2025-08-14 PROCEDURE — 97112 NEUROMUSCULAR REEDUCATION: CPT

## 2025-08-14 PROCEDURE — 97110 THERAPEUTIC EXERCISES: CPT

## 2025-08-19 ENCOUNTER — HOSPITAL ENCOUNTER (OUTPATIENT)
Facility: HOSPITAL | Age: 80
Setting detail: RECURRING SERIES
Discharge: HOME | End: 2025-08-22
Attending: PHYSICAL MEDICINE & REHABILITATION
Payer: MEDICARE

## 2025-08-19 PROCEDURE — 97530 THERAPEUTIC ACTIVITIES: CPT

## 2025-08-19 PROCEDURE — 97110 THERAPEUTIC EXERCISES: CPT

## 2025-08-19 PROCEDURE — 97112 NEUROMUSCULAR REEDUCATION: CPT

## 2025-08-21 ENCOUNTER — TELEPHONE (OUTPATIENT)
Facility: HOSPITAL | Age: 80
End: 2025-08-21

## 2025-08-21 ENCOUNTER — HOSPITAL ENCOUNTER (OUTPATIENT)
Facility: HOSPITAL | Age: 80
Setting detail: RECURRING SERIES
Discharge: HOME | End: 2025-08-24
Attending: PHYSICAL MEDICINE & REHABILITATION
Payer: MEDICARE

## 2025-08-21 PROCEDURE — 97530 THERAPEUTIC ACTIVITIES: CPT

## 2025-08-21 PROCEDURE — 97110 THERAPEUTIC EXERCISES: CPT

## 2025-08-21 PROCEDURE — 97112 NEUROMUSCULAR REEDUCATION: CPT

## 2025-08-26 ENCOUNTER — HOSPITAL ENCOUNTER (OUTPATIENT)
Facility: HOSPITAL | Age: 80
Setting detail: RECURRING SERIES
Discharge: HOME | End: 2025-08-29
Attending: PHYSICAL MEDICINE & REHABILITATION
Payer: MEDICARE

## 2025-08-26 PROCEDURE — 97530 THERAPEUTIC ACTIVITIES: CPT

## 2025-08-26 PROCEDURE — 97110 THERAPEUTIC EXERCISES: CPT

## 2025-08-26 PROCEDURE — 97112 NEUROMUSCULAR REEDUCATION: CPT

## 2025-08-28 ENCOUNTER — HOSPITAL ENCOUNTER (OUTPATIENT)
Facility: HOSPITAL | Age: 80
Setting detail: RECURRING SERIES
Discharge: HOME | End: 2025-08-31
Attending: PHYSICAL MEDICINE & REHABILITATION
Payer: MEDICARE

## 2025-08-28 PROCEDURE — 97530 THERAPEUTIC ACTIVITIES: CPT

## 2025-08-28 PROCEDURE — 97110 THERAPEUTIC EXERCISES: CPT

## 2025-08-28 PROCEDURE — 97112 NEUROMUSCULAR REEDUCATION: CPT

## 2025-09-03 ENCOUNTER — HOSPITAL ENCOUNTER (OUTPATIENT)
Facility: HOSPITAL | Age: 80
Setting detail: RECURRING SERIES
Discharge: HOME | End: 2025-09-06
Attending: PHYSICAL MEDICINE & REHABILITATION
Payer: MEDICARE

## 2025-09-03 PROCEDURE — 97112 NEUROMUSCULAR REEDUCATION: CPT

## 2025-09-03 PROCEDURE — 97110 THERAPEUTIC EXERCISES: CPT

## 2025-09-03 PROCEDURE — 97530 THERAPEUTIC ACTIVITIES: CPT

## (undated) DEVICE — REM POLYHESIVE ADULT PATIENT RETURN ELECTRODE: Brand: VALLEYLAB

## (undated) DEVICE — 4-PORT MANIFOLD: Brand: NEPTUNE 2

## (undated) DEVICE — INTENDED FOR TISSUE SEPARATION, AND OTHER PROCEDURES THAT REQUIRE A SHARP SURGICAL BLADE TO PUNCTURE OR CUT.: Brand: BARD-PARKER SAFETY BLADES SIZE 10, STERILE

## (undated) DEVICE — NEEDLE NRV STIM 20GA L6IN 30DEG BVL INSUL W/ EXTN SET

## (undated) DEVICE — Device

## (undated) DEVICE — PACK PROCEDURE SURG TOT HIP BSHR LF

## (undated) DEVICE — HANDPIECE SET WITH HIGH FLOW TIP AND SUCTION TUBE: Brand: INTERPULSE

## (undated) DEVICE — BIPOLAR SEALER 23-112-1 AQM 6.0: Brand: AQUAMANTYS ®

## (undated) DEVICE — SUTURE MCRYL SZ 2-0 L36IN ABSRB UD L36MM CT-1 1/2 CIR Y945H

## (undated) DEVICE — BLANKET WRM AD W50XL85.8IN PACU FULL BODY FORC AIR

## (undated) DEVICE — STOCKING COMPR XL L16-18IN LNG 19MMHG ANK 10-11IN CALF

## (undated) DEVICE — SET EPI 18GA L3.5IN TUOHY NDL W/ 20GA CLS TIP NYL CATH

## (undated) DEVICE — 3M™ STERI-DRAPE™ INSTRUMENT POUCH 1018: Brand: STERI-DRAPE™

## (undated) DEVICE — KIT CLN UP BON SECOURS MARYV

## (undated) DEVICE — Device: Brand: MEDEX

## (undated) DEVICE — BLANKET WRM W29.9XL79.1IN UP BODY FORC AIR MISTRAL-AIR

## (undated) DEVICE — SYRINGE MED 3ML NDL 22GA L1 1/2IN REG BVL SFGLDE

## (undated) DEVICE — SCR HEX 6.5X30MM -- TRIDENT II
Type: IMPLANTABLE DEVICE | Site: HIP | Status: NON-FUNCTIONAL
Removed: 2022-05-24

## (undated) DEVICE — ICE BAG INSERTS

## (undated) DEVICE — GARMENT,MEDLINE,DVT,INT,CALF,LG, GEN2: Brand: MEDLINE

## (undated) DEVICE — SUTURE VCRL SZ 2 L27IN ABSRB VLT L65MM TP-1 1/2 CIR J649G

## (undated) DEVICE — SUTURE VCRL SZ 0 L27IN ABSRB UD L36MM CT-1 1/2 CIR J260H

## (undated) DEVICE — SYR LR LCK 1ML GRAD NSAF 30ML --

## (undated) DEVICE — Device: Brand: JELCO

## (undated) DEVICE — SYR 10ML LUER LOK 1/5ML GRAD --

## (undated) DEVICE — Z DISCONTINUED USE 2744636  DRESSING AQUACEL 14 IN ALG W3.5XL14IN POLYUR FLM CVR W/ HYDRCOLL

## (undated) DEVICE — GOWN,REINFORCED,POLY,AURORA,XXLARGE,STR: Brand: MEDLINE

## (undated) DEVICE — SUTURE VCRL SZ 1 L27IN ABSRB VLT CTX L48MM 1 2 CIR SGL ARMED J365H

## (undated) DEVICE — PILLOW POS W15XH6XL22IN RASPBERRY FOAM ABD W/ STRP DISP FOR

## (undated) DEVICE — SOLUTION IRRIG 3000ML LAC R FLX CONT

## (undated) DEVICE — PREP SKN CHLRAPRP APL 26ML STR --

## (undated) DEVICE — 450 ML BOTTLE OF 0.05% CHLORHEXIDINE GLUCONATE IN 99.95% STERILE WATER FOR IRRIGATION, USP AND APPLICATOR.: Brand: IRRISEPT ANTIMICROBIAL WOUND LAVAGE

## (undated) DEVICE — 3M™ STERI-DRAPE™ U-DRAPE 1015: Brand: STERI-DRAPE™

## (undated) DEVICE — SKIN MARKER,REGULAR TIP WITH RULER AND LABELS: Brand: DEVON

## (undated) DEVICE — SOLUTION IV 1000ML 0.9% SOD CHL

## (undated) DEVICE — BANDAGE ADH W0.75XL3IN UNIV WVN FAB NAT GEN USE STRP N ADH

## (undated) DEVICE — X-RAY SPONGES,12 PLY: Brand: DERMACEA

## (undated) DEVICE — SPIROMETER INCENT 2500ML W ONE W VLV

## (undated) DEVICE — INTENDED FOR TISSUE SEPARATION, AND OTHER PROCEDURES THAT REQUIRE A SHARP SURGICAL BLADE TO PUNCTURE OR CUT.: Brand: BARD-PARKER SAFETY BLADES SIZE 15, STERILE

## (undated) DEVICE — TAPE,CLOTH/SILK,CURAD,3"X10YD,LF,40/CS: Brand: CURAD

## (undated) DEVICE — GOWN ,SIRUS ,NONREINFORCED 4XL: Brand: MEDLINE

## (undated) DEVICE — Z DISCONTINUED BY MEDLINE USE 2711682 TRAY SKIN PREP DRY W/ PREM GLV

## (undated) DEVICE — INSERT ACET E 0 DEG 36 MM HIP X3 TRIDENT
Type: IMPLANTABLE DEVICE | Site: HIP | Status: NON-FUNCTIONAL
Removed: 2022-05-24

## (undated) DEVICE — DECANTER BAG 9": Brand: MEDLINE INDUSTRIES, INC.

## (undated) DEVICE — HOOD WITH PEEL AWAY FACE SHIELD: Brand: T7PLUS

## (undated) DEVICE — STRYKER PERFORMANCE SERIES SAGITTAL BLADE: Brand: STRYKER PERFORMANCE SERIES